# Patient Record
Sex: FEMALE | Race: WHITE | NOT HISPANIC OR LATINO | Employment: UNEMPLOYED | URBAN - METROPOLITAN AREA
[De-identification: names, ages, dates, MRNs, and addresses within clinical notes are randomized per-mention and may not be internally consistent; named-entity substitution may affect disease eponyms.]

---

## 2019-10-10 ENCOUNTER — APPOINTMENT (OUTPATIENT)
Dept: LAB | Facility: HOSPITAL | Age: 54
End: 2019-10-10
Payer: COMMERCIAL

## 2019-10-10 ENCOUNTER — TRANSCRIBE ORDERS (OUTPATIENT)
Dept: ADMINISTRATIVE | Facility: HOSPITAL | Age: 54
End: 2019-10-10

## 2019-10-10 DIAGNOSIS — E11.9 DIABETES MELLITUS WITHOUT COMPLICATION (HCC): Primary | ICD-10-CM

## 2019-10-10 DIAGNOSIS — E78.5 HYPERLIPIDEMIA, UNSPECIFIED HYPERLIPIDEMIA TYPE: ICD-10-CM

## 2019-10-10 DIAGNOSIS — E03.9 HYPOTHYROIDISM, ADULT: ICD-10-CM

## 2019-10-10 DIAGNOSIS — E55.9 VITAMIN D DEFICIENCY: ICD-10-CM

## 2019-10-10 DIAGNOSIS — R53.83 TIREDNESS: ICD-10-CM

## 2019-10-10 DIAGNOSIS — E11.9 DIABETES MELLITUS WITHOUT COMPLICATION (HCC): ICD-10-CM

## 2019-10-10 DIAGNOSIS — Z79.01 LONG TERM (CURRENT) USE OF ANTICOAGULANTS: ICD-10-CM

## 2019-10-10 LAB
25(OH)D3 SERPL-MCNC: 36.1 NG/ML (ref 30–100)
ALBUMIN SERPL BCP-MCNC: 3.6 G/DL (ref 3.5–5)
ALP SERPL-CCNC: 61 U/L (ref 46–116)
ALT SERPL W P-5'-P-CCNC: 21 U/L (ref 12–78)
ANION GAP SERPL CALCULATED.3IONS-SCNC: 5 MMOL/L (ref 4–13)
AST SERPL W P-5'-P-CCNC: 20 U/L (ref 5–45)
BILIRUB DIRECT SERPL-MCNC: 0.1 MG/DL (ref 0–0.2)
BILIRUB SERPL-MCNC: 0.2 MG/DL (ref 0.2–1)
BUN SERPL-MCNC: 3 MG/DL (ref 5–25)
CALCIUM SERPL-MCNC: 8.5 MG/DL (ref 8.3–10.1)
CHLORIDE SERPL-SCNC: 94 MMOL/L (ref 100–108)
CHOLEST SERPL-MCNC: 162 MG/DL (ref 50–200)
CO2 SERPL-SCNC: 30 MMOL/L (ref 21–32)
CREAT SERPL-MCNC: 0.5 MG/DL (ref 0.6–1.3)
ERYTHROCYTE [DISTWIDTH] IN BLOOD BY AUTOMATED COUNT: 12 % (ref 11.6–15.1)
GFR SERPL CREATININE-BSD FRML MDRD: 109 ML/MIN/1.73SQ M
GLUCOSE P FAST SERPL-MCNC: 85 MG/DL (ref 65–99)
HCT VFR BLD AUTO: 40.1 % (ref 34.8–46.1)
HDLC SERPL-MCNC: 76 MG/DL (ref 40–60)
HGB BLD-MCNC: 13.6 G/DL (ref 11.5–15.4)
INR PPP: 2.85 (ref 0.84–1.19)
LDLC SERPL CALC-MCNC: 77 MG/DL (ref 0–100)
MCH RBC QN AUTO: 33.1 PG (ref 26.8–34.3)
MCHC RBC AUTO-ENTMCNC: 33.9 G/DL (ref 31.4–37.4)
MCV RBC AUTO: 98 FL (ref 82–98)
NONHDLC SERPL-MCNC: 86 MG/DL
PLATELET # BLD AUTO: 307 THOUSANDS/UL (ref 149–390)
PMV BLD AUTO: 9.1 FL (ref 8.9–12.7)
POTASSIUM SERPL-SCNC: 4.3 MMOL/L (ref 3.5–5.3)
PROT SERPL-MCNC: 7.1 G/DL (ref 6.4–8.2)
PROTHROMBIN TIME: 30.3 SECONDS (ref 11.6–14.5)
RBC # BLD AUTO: 4.11 MILLION/UL (ref 3.81–5.12)
SODIUM SERPL-SCNC: 129 MMOL/L (ref 136–145)
TRIGL SERPL-MCNC: 46 MG/DL
TSH SERPL DL<=0.05 MIU/L-ACNC: 1.27 UIU/ML (ref 0.36–3.74)
WBC # BLD AUTO: 6.1 THOUSAND/UL (ref 4.31–10.16)

## 2019-10-10 PROCEDURE — 80048 BASIC METABOLIC PNL TOTAL CA: CPT

## 2019-10-10 PROCEDURE — 85027 COMPLETE CBC AUTOMATED: CPT

## 2019-10-10 PROCEDURE — 82306 VITAMIN D 25 HYDROXY: CPT

## 2019-10-10 PROCEDURE — 80061 LIPID PANEL: CPT

## 2019-10-10 PROCEDURE — 80076 HEPATIC FUNCTION PANEL: CPT

## 2019-10-10 PROCEDURE — 36415 COLL VENOUS BLD VENIPUNCTURE: CPT

## 2019-10-10 PROCEDURE — 85610 PROTHROMBIN TIME: CPT

## 2019-10-10 PROCEDURE — 84443 ASSAY THYROID STIM HORMONE: CPT

## 2019-11-15 ENCOUNTER — APPOINTMENT (OUTPATIENT)
Dept: LAB | Facility: HOSPITAL | Age: 54
End: 2019-11-15
Payer: COMMERCIAL

## 2019-11-15 ENCOUNTER — TRANSCRIBE ORDERS (OUTPATIENT)
Dept: ADMINISTRATIVE | Facility: HOSPITAL | Age: 54
End: 2019-11-15

## 2019-11-15 DIAGNOSIS — Z79.01 LONG TERM (CURRENT) USE OF ANTICOAGULANTS: ICD-10-CM

## 2019-11-15 DIAGNOSIS — Z79.01 LONG TERM (CURRENT) USE OF ANTICOAGULANTS: Primary | ICD-10-CM

## 2020-02-04 ENCOUNTER — APPOINTMENT (OUTPATIENT)
Dept: LAB | Facility: HOSPITAL | Age: 55
End: 2020-02-04
Payer: COMMERCIAL

## 2020-02-04 ENCOUNTER — TRANSCRIBE ORDERS (OUTPATIENT)
Dept: ADMINISTRATIVE | Facility: HOSPITAL | Age: 55
End: 2020-02-04

## 2020-02-04 DIAGNOSIS — Z79.01 LONG TERM (CURRENT) USE OF ANTICOAGULANTS: ICD-10-CM

## 2020-02-04 DIAGNOSIS — Z79.01 LONG TERM (CURRENT) USE OF ANTICOAGULANTS: Primary | ICD-10-CM

## 2021-09-10 ENCOUNTER — APPOINTMENT (OUTPATIENT)
Dept: LAB | Facility: HOSPITAL | Age: 56
End: 2021-09-10
Payer: COMMERCIAL

## 2021-09-10 DIAGNOSIS — E13.9 OTHER SPECIFIED DIABETES MELLITUS WITHOUT COMPLICATION, WITHOUT LONG-TERM CURRENT USE OF INSULIN (HCC): ICD-10-CM

## 2021-09-10 DIAGNOSIS — E78.5 HYPERLIPIDEMIA, UNSPECIFIED HYPERLIPIDEMIA TYPE: ICD-10-CM

## 2021-09-10 DIAGNOSIS — R53.82 CHRONIC FATIGUE: Primary | ICD-10-CM

## 2021-09-10 DIAGNOSIS — E03.9 MYXEDEMA HEART DISEASE: ICD-10-CM

## 2021-09-10 DIAGNOSIS — I51.9 MYXEDEMA HEART DISEASE: ICD-10-CM

## 2021-09-10 DIAGNOSIS — Z79.01 LONG TERM (CURRENT) USE OF ANTICOAGULANTS: ICD-10-CM

## 2021-09-10 LAB
ALBUMIN SERPL BCP-MCNC: 4 G/DL (ref 3.5–5)
ALP SERPL-CCNC: 49 U/L (ref 46–116)
ALT SERPL W P-5'-P-CCNC: 23 U/L (ref 12–78)
ANION GAP SERPL CALCULATED.3IONS-SCNC: 7 MMOL/L (ref 4–13)
AST SERPL W P-5'-P-CCNC: 23 U/L (ref 5–45)
BILIRUB SERPL-MCNC: 0.64 MG/DL (ref 0.2–1)
BUN SERPL-MCNC: 6 MG/DL (ref 5–25)
CALCIUM SERPL-MCNC: 8.8 MG/DL (ref 8.3–10.1)
CHLORIDE SERPL-SCNC: 95 MMOL/L (ref 100–108)
CHOLEST SERPL-MCNC: 163 MG/DL (ref 50–200)
CO2 SERPL-SCNC: 30 MMOL/L (ref 21–32)
CREAT SERPL-MCNC: 0.49 MG/DL (ref 0.6–1.3)
ERYTHROCYTE [DISTWIDTH] IN BLOOD BY AUTOMATED COUNT: 12.2 % (ref 11.6–15.1)
EST. AVERAGE GLUCOSE BLD GHB EST-MCNC: 105 MG/DL
GFR SERPL CREATININE-BSD FRML MDRD: 109 ML/MIN/1.73SQ M
GLUCOSE P FAST SERPL-MCNC: 78 MG/DL (ref 65–99)
HBA1C MFR BLD: 5.3 %
HCT VFR BLD AUTO: 40.5 % (ref 34.8–46.1)
HDLC SERPL-MCNC: 75 MG/DL
HGB BLD-MCNC: 13.8 G/DL (ref 11.5–15.4)
INR PPP: 1.37 (ref 0.84–1.19)
LDLC SERPL CALC-MCNC: 77 MG/DL (ref 0–100)
MCH RBC QN AUTO: 32.8 PG (ref 26.8–34.3)
MCHC RBC AUTO-ENTMCNC: 34.1 G/DL (ref 31.4–37.4)
MCV RBC AUTO: 96 FL (ref 82–98)
NONHDLC SERPL-MCNC: 88 MG/DL
PLATELET # BLD AUTO: 264 THOUSANDS/UL (ref 149–390)
PMV BLD AUTO: 9.2 FL (ref 8.9–12.7)
POTASSIUM SERPL-SCNC: 4.7 MMOL/L (ref 3.5–5.3)
PROT SERPL-MCNC: 7.5 G/DL (ref 6.4–8.2)
PROTHROMBIN TIME: 17.1 SECONDS (ref 11.6–14.5)
RBC # BLD AUTO: 4.21 MILLION/UL (ref 3.81–5.12)
SODIUM SERPL-SCNC: 132 MMOL/L (ref 136–145)
TRIGL SERPL-MCNC: 56 MG/DL
TSH SERPL DL<=0.05 MIU/L-ACNC: 1.21 UIU/ML (ref 0.36–3.74)
WBC # BLD AUTO: 6.42 THOUSAND/UL (ref 4.31–10.16)

## 2021-09-10 PROCEDURE — 84443 ASSAY THYROID STIM HORMONE: CPT

## 2021-09-10 PROCEDURE — 36415 COLL VENOUS BLD VENIPUNCTURE: CPT

## 2021-09-10 PROCEDURE — 83036 HEMOGLOBIN GLYCOSYLATED A1C: CPT

## 2021-09-10 PROCEDURE — 85610 PROTHROMBIN TIME: CPT

## 2021-09-10 PROCEDURE — 80061 LIPID PANEL: CPT

## 2021-09-10 PROCEDURE — 80053 COMPREHEN METABOLIC PANEL: CPT

## 2021-09-10 PROCEDURE — 85027 COMPLETE CBC AUTOMATED: CPT

## 2023-09-19 ENCOUNTER — APPOINTMENT (OUTPATIENT)
Dept: LAB | Facility: HOSPITAL | Age: 58
End: 2023-09-19
Payer: COMMERCIAL

## 2023-09-19 DIAGNOSIS — E03.9 MYXEDEMA HEART DISEASE: ICD-10-CM

## 2023-09-19 DIAGNOSIS — R53.83 FATIGUE, UNSPECIFIED TYPE: ICD-10-CM

## 2023-09-19 DIAGNOSIS — Z79.01 LONG TERM (CURRENT) USE OF ANTICOAGULANTS: ICD-10-CM

## 2023-09-19 DIAGNOSIS — E78.5 HYPERLIPIDEMIA, UNSPECIFIED HYPERLIPIDEMIA TYPE: ICD-10-CM

## 2023-09-19 DIAGNOSIS — I51.9 MYXEDEMA HEART DISEASE: ICD-10-CM

## 2023-09-19 DIAGNOSIS — E11.9 DIABETES MELLITUS WITHOUT COMPLICATION (HCC): ICD-10-CM

## 2023-09-19 LAB
ALBUMIN SERPL BCP-MCNC: 4.3 G/DL (ref 3.5–5)
ALP SERPL-CCNC: 65 U/L (ref 34–104)
ALT SERPL W P-5'-P-CCNC: 22 U/L (ref 7–52)
ANION GAP SERPL CALCULATED.3IONS-SCNC: 5 MMOL/L
AST SERPL W P-5'-P-CCNC: 23 U/L (ref 13–39)
BILIRUB SERPL-MCNC: 0.5 MG/DL (ref 0.2–1)
BUN SERPL-MCNC: 7 MG/DL (ref 5–25)
CALCIUM SERPL-MCNC: 9.4 MG/DL (ref 8.4–10.2)
CHLORIDE SERPL-SCNC: 95 MMOL/L (ref 96–108)
CHOLEST SERPL-MCNC: 150 MG/DL
CO2 SERPL-SCNC: 32 MMOL/L (ref 21–32)
CREAT SERPL-MCNC: 0.44 MG/DL (ref 0.6–1.3)
ERYTHROCYTE [DISTWIDTH] IN BLOOD BY AUTOMATED COUNT: 11.9 % (ref 11.6–15.1)
GFR SERPL CREATININE-BSD FRML MDRD: 110 ML/MIN/1.73SQ M
GLUCOSE P FAST SERPL-MCNC: 83 MG/DL (ref 65–99)
HCT VFR BLD AUTO: 43.2 % (ref 34.8–46.1)
HDLC SERPL-MCNC: 74 MG/DL
HGB BLD-MCNC: 14.3 G/DL (ref 11.5–15.4)
INR PPP: 2.13 (ref 0.84–1.19)
LDLC SERPL CALC-MCNC: 62 MG/DL (ref 0–100)
MCH RBC QN AUTO: 33.2 PG (ref 26.8–34.3)
MCHC RBC AUTO-ENTMCNC: 33.1 G/DL (ref 31.4–37.4)
MCV RBC AUTO: 100 FL (ref 82–98)
NONHDLC SERPL-MCNC: 76 MG/DL
PLATELET # BLD AUTO: 192 THOUSANDS/UL (ref 149–390)
PMV BLD AUTO: 9.9 FL (ref 8.9–12.7)
POTASSIUM SERPL-SCNC: 4.7 MMOL/L (ref 3.5–5.3)
PROT SERPL-MCNC: 7.1 G/DL (ref 6.4–8.4)
PROTHROMBIN TIME: 24.7 SECONDS (ref 11.6–14.5)
RBC # BLD AUTO: 4.31 MILLION/UL (ref 3.81–5.12)
SODIUM SERPL-SCNC: 132 MMOL/L (ref 135–147)
TRIGL SERPL-MCNC: 72 MG/DL
TSH SERPL DL<=0.05 MIU/L-ACNC: 1.05 UIU/ML (ref 0.45–4.5)
WBC # BLD AUTO: 4.78 THOUSAND/UL (ref 4.31–10.16)

## 2023-09-19 PROCEDURE — 84443 ASSAY THYROID STIM HORMONE: CPT

## 2023-09-19 PROCEDURE — 80053 COMPREHEN METABOLIC PANEL: CPT

## 2023-09-19 PROCEDURE — 85027 COMPLETE CBC AUTOMATED: CPT

## 2023-09-19 PROCEDURE — 85610 PROTHROMBIN TIME: CPT

## 2023-09-19 PROCEDURE — 36415 COLL VENOUS BLD VENIPUNCTURE: CPT

## 2023-09-19 PROCEDURE — 80061 LIPID PANEL: CPT

## 2024-01-05 ENCOUNTER — APPOINTMENT (EMERGENCY)
Dept: RADIOLOGY | Facility: HOSPITAL | Age: 59
DRG: 177 | End: 2024-01-05
Payer: COMMERCIAL

## 2024-01-05 ENCOUNTER — HOSPITAL ENCOUNTER (INPATIENT)
Facility: HOSPITAL | Age: 59
LOS: 8 days | Discharge: HOME | DRG: 177 | End: 2024-01-13
Attending: EMERGENCY MEDICINE | Admitting: FAMILY MEDICINE
Payer: COMMERCIAL

## 2024-01-05 DIAGNOSIS — U07.1 COVID-19: Primary | ICD-10-CM

## 2024-01-05 DIAGNOSIS — J44.9 COPD (CHRONIC OBSTRUCTIVE PULMONARY DISEASE) (HCC): ICD-10-CM

## 2024-01-05 DIAGNOSIS — J44.1 COPD EXACERBATION (HCC): ICD-10-CM

## 2024-01-05 DIAGNOSIS — E83.42 HYPOMAGNESEMIA: ICD-10-CM

## 2024-01-05 DIAGNOSIS — E87.1 HYPONATREMIA: ICD-10-CM

## 2024-01-05 DIAGNOSIS — R78.81 POSITIVE BLOOD CULTURE: ICD-10-CM

## 2024-01-05 PROBLEM — G35 MULTIPLE SCLEROSIS (HCC): Status: ACTIVE | Noted: 2024-01-05

## 2024-01-05 PROBLEM — G54.0 THORACIC OUTLET SYNDROME: Status: ACTIVE | Noted: 2024-01-05

## 2024-01-05 LAB
2HR DELTA HS TROPONIN: -15 NG/L
4HR DELTA HS TROPONIN: -19 NG/L
ALBUMIN SERPL BCP-MCNC: 4 G/DL (ref 3.5–5)
ALP SERPL-CCNC: 50 U/L (ref 34–104)
ALT SERPL W P-5'-P-CCNC: 25 U/L (ref 7–52)
ANION GAP SERPL CALCULATED.3IONS-SCNC: 7 MMOL/L
APTT PPP: 46 SECONDS (ref 23–37)
AST SERPL W P-5'-P-CCNC: 53 U/L (ref 13–39)
BASE EX.OXY STD BLDV CALC-SCNC: 65.1 % (ref 60–80)
BASE EXCESS BLDV CALC-SCNC: 4.7 MMOL/L
BASOPHILS # BLD AUTO: 0.01 THOUSANDS/ÂΜL (ref 0–0.1)
BASOPHILS NFR BLD AUTO: 0 % (ref 0–1)
BILIRUB SERPL-MCNC: 0.39 MG/DL (ref 0.2–1)
BNP SERPL-MCNC: 262 PG/ML (ref 0–100)
BUN SERPL-MCNC: 14 MG/DL (ref 5–25)
CALCIUM SERPL-MCNC: 8.1 MG/DL (ref 8.4–10.2)
CARDIAC TROPONIN I PNL SERPL HS: 73 NG/L
CARDIAC TROPONIN I PNL SERPL HS: 77 NG/L
CARDIAC TROPONIN I PNL SERPL HS: 92 NG/L
CHLORIDE SERPL-SCNC: 77 MMOL/L (ref 96–108)
CO2 SERPL-SCNC: 33 MMOL/L (ref 21–32)
CREAT SERPL-MCNC: 0.36 MG/DL (ref 0.6–1.3)
D DIMER PPP FEU-MCNC: <0.27 UG/ML FEU
EOSINOPHIL # BLD AUTO: 0 THOUSAND/ÂΜL (ref 0–0.61)
EOSINOPHIL NFR BLD AUTO: 0 % (ref 0–6)
ERYTHROCYTE [DISTWIDTH] IN BLOOD BY AUTOMATED COUNT: 11.6 % (ref 11.6–15.1)
FLUAV RNA RESP QL NAA+PROBE: NEGATIVE
FLUBV RNA RESP QL NAA+PROBE: NEGATIVE
GFR SERPL CREATININE-BSD FRML MDRD: 118 ML/MIN/1.73SQ M
GLUCOSE SERPL-MCNC: 117 MG/DL (ref 65–140)
HCO3 BLDV-SCNC: 32.9 MMOL/L (ref 24–30)
HCT VFR BLD AUTO: 43.3 % (ref 34.8–46.1)
HGB BLD-MCNC: 14.8 G/DL (ref 11.5–15.4)
IMM GRANULOCYTES # BLD AUTO: 0.02 THOUSAND/UL (ref 0–0.2)
IMM GRANULOCYTES NFR BLD AUTO: 0 % (ref 0–2)
INR PPP: 3.53 (ref 0.84–1.19)
LACTATE SERPL-SCNC: 1.2 MMOL/L (ref 0.5–2)
LYMPHOCYTES # BLD AUTO: 0.8 THOUSANDS/ÂΜL (ref 0.6–4.47)
LYMPHOCYTES NFR BLD AUTO: 17 % (ref 14–44)
MCH RBC QN AUTO: 33.2 PG (ref 26.8–34.3)
MCHC RBC AUTO-ENTMCNC: 34.2 G/DL (ref 31.4–37.4)
MCV RBC AUTO: 97 FL (ref 82–98)
MONOCYTES # BLD AUTO: 0.62 THOUSAND/ÂΜL (ref 0.17–1.22)
MONOCYTES NFR BLD AUTO: 13 % (ref 4–12)
NEUTROPHILS # BLD AUTO: 3.31 THOUSANDS/ÂΜL (ref 1.85–7.62)
NEUTS SEG NFR BLD AUTO: 70 % (ref 43–75)
NRBC BLD AUTO-RTO: 0 /100 WBCS
O2 CT BLDV-SCNC: 14.6 ML/DL
PCO2 BLDV: 63.6 MM HG (ref 42–50)
PH BLDV: 7.33 [PH] (ref 7.3–7.4)
PLATELET # BLD AUTO: 161 THOUSANDS/UL (ref 149–390)
PMV BLD AUTO: 9.9 FL (ref 8.9–12.7)
PO2 BLDV: 37.6 MM HG (ref 35–45)
POTASSIUM SERPL-SCNC: 4.8 MMOL/L (ref 3.5–5.3)
PROT SERPL-MCNC: 6.5 G/DL (ref 6.4–8.4)
PROTHROMBIN TIME: 36.4 SECONDS (ref 11.6–14.5)
RBC # BLD AUTO: 4.46 MILLION/UL (ref 3.81–5.12)
RSV RNA RESP QL NAA+PROBE: NEGATIVE
SARS-COV-2 RNA RESP QL NAA+PROBE: POSITIVE
SODIUM SERPL-SCNC: 117 MMOL/L (ref 135–147)
WBC # BLD AUTO: 4.76 THOUSAND/UL (ref 4.31–10.16)

## 2024-01-05 PROCEDURE — 99285 EMERGENCY DEPT VISIT HI MDM: CPT

## 2024-01-05 PROCEDURE — 80053 COMPREHEN METABOLIC PANEL: CPT | Performed by: PHYSICIAN ASSISTANT

## 2024-01-05 PROCEDURE — 36600 WITHDRAWAL OF ARTERIAL BLOOD: CPT

## 2024-01-05 PROCEDURE — 85379 FIBRIN DEGRADATION QUANT: CPT | Performed by: PHYSICIAN ASSISTANT

## 2024-01-05 PROCEDURE — 36415 COLL VENOUS BLD VENIPUNCTURE: CPT | Performed by: PHYSICIAN ASSISTANT

## 2024-01-05 PROCEDURE — 87154 CUL TYP ID BLD PTHGN 6+ TRGT: CPT | Performed by: PHYSICIAN ASSISTANT

## 2024-01-05 PROCEDURE — 85014 HEMATOCRIT: CPT

## 2024-01-05 PROCEDURE — 85025 COMPLETE CBC W/AUTO DIFF WBC: CPT | Performed by: PHYSICIAN ASSISTANT

## 2024-01-05 PROCEDURE — 99223 1ST HOSP IP/OBS HIGH 75: CPT | Performed by: FAMILY MEDICINE

## 2024-01-05 PROCEDURE — 87077 CULTURE AEROBIC IDENTIFY: CPT | Performed by: PHYSICIAN ASSISTANT

## 2024-01-05 PROCEDURE — 94644 CONT INHLJ TX 1ST HOUR: CPT

## 2024-01-05 PROCEDURE — 87040 BLOOD CULTURE FOR BACTERIA: CPT | Performed by: PHYSICIAN ASSISTANT

## 2024-01-05 PROCEDURE — 83880 ASSAY OF NATRIURETIC PEPTIDE: CPT | Performed by: PHYSICIAN ASSISTANT

## 2024-01-05 PROCEDURE — 82803 BLOOD GASES ANY COMBINATION: CPT

## 2024-01-05 PROCEDURE — 84295 ASSAY OF SERUM SODIUM: CPT

## 2024-01-05 PROCEDURE — 85730 THROMBOPLASTIN TIME PARTIAL: CPT | Performed by: PHYSICIAN ASSISTANT

## 2024-01-05 PROCEDURE — 82947 ASSAY GLUCOSE BLOOD QUANT: CPT

## 2024-01-05 PROCEDURE — 0241U HB NFCT DS VIR RESP RNA 4 TRGT: CPT | Performed by: PHYSICIAN ASSISTANT

## 2024-01-05 PROCEDURE — 82805 BLOOD GASES W/O2 SATURATION: CPT | Performed by: PHYSICIAN ASSISTANT

## 2024-01-05 PROCEDURE — 83605 ASSAY OF LACTIC ACID: CPT | Performed by: PHYSICIAN ASSISTANT

## 2024-01-05 PROCEDURE — 84132 ASSAY OF SERUM POTASSIUM: CPT

## 2024-01-05 PROCEDURE — 71045 X-RAY EXAM CHEST 1 VIEW: CPT

## 2024-01-05 PROCEDURE — 82330 ASSAY OF CALCIUM: CPT

## 2024-01-05 PROCEDURE — 93005 ELECTROCARDIOGRAM TRACING: CPT

## 2024-01-05 PROCEDURE — 94760 N-INVAS EAR/PLS OXIMETRY 1: CPT

## 2024-01-05 PROCEDURE — 96374 THER/PROPH/DIAG INJ IV PUSH: CPT

## 2024-01-05 PROCEDURE — 99285 EMERGENCY DEPT VISIT HI MDM: CPT | Performed by: PHYSICIAN ASSISTANT

## 2024-01-05 PROCEDURE — 84484 ASSAY OF TROPONIN QUANT: CPT | Performed by: PHYSICIAN ASSISTANT

## 2024-01-05 PROCEDURE — 85610 PROTHROMBIN TIME: CPT | Performed by: PHYSICIAN ASSISTANT

## 2024-01-05 RX ORDER — DIAZEPAM 5 MG/1
5 TABLET ORAL EVERY 6 HOURS PRN
COMMUNITY

## 2024-01-05 RX ORDER — FLUTICASONE PROPIONATE AND SALMETEROL 250; 50 UG/1; UG/1
1 POWDER RESPIRATORY (INHALATION) 2 TIMES DAILY
Status: ON HOLD | COMMUNITY
End: 2024-01-13

## 2024-01-05 RX ORDER — ALBUTEROL SULFATE 90 UG/1
2 AEROSOL, METERED RESPIRATORY (INHALATION) EVERY 4 HOURS PRN
Status: DISCONTINUED | OUTPATIENT
Start: 2024-01-05 | End: 2024-01-13 | Stop reason: HOSPADM

## 2024-01-05 RX ORDER — SODIUM CHLORIDE FOR INHALATION 0.9 %
12 VIAL, NEBULIZER (ML) INHALATION ONCE
Status: COMPLETED | OUTPATIENT
Start: 2024-01-05 | End: 2024-01-05

## 2024-01-05 RX ORDER — WARFARIN SODIUM 5 MG/1
5 TABLET ORAL
COMMUNITY

## 2024-01-05 RX ORDER — ALBUTEROL SULFATE 90 UG/1
2 AEROSOL, METERED RESPIRATORY (INHALATION) EVERY 6 HOURS PRN
Status: ON HOLD | COMMUNITY
End: 2024-01-13

## 2024-01-05 RX ORDER — TIOTROPIUM BROMIDE 18 UG/1
18 CAPSULE ORAL; RESPIRATORY (INHALATION) DAILY
Status: ON HOLD | COMMUNITY
End: 2024-01-13

## 2024-01-05 RX ORDER — METHYLPREDNISOLONE SODIUM SUCCINATE 125 MG/2ML
80 INJECTION, POWDER, LYOPHILIZED, FOR SOLUTION INTRAMUSCULAR; INTRAVENOUS ONCE
Status: COMPLETED | OUTPATIENT
Start: 2024-01-05 | End: 2024-01-05

## 2024-01-05 RX ORDER — ACETAMINOPHEN AND CODEINE PHOSPHATE 300; 60 MG/1; MG/1
1 TABLET ORAL EVERY 4 HOURS PRN
COMMUNITY

## 2024-01-05 RX ADMIN — IPRATROPIUM BROMIDE 1 MG: 0.5 SOLUTION RESPIRATORY (INHALATION) at 18:18

## 2024-01-05 RX ADMIN — METHYLPREDNISOLONE SODIUM SUCCINATE 80 MG: 125 INJECTION, POWDER, FOR SOLUTION INTRAMUSCULAR; INTRAVENOUS at 18:41

## 2024-01-05 RX ADMIN — Medication 12 ML: at 18:18

## 2024-01-05 RX ADMIN — ALBUTEROL SULFATE 10 MG: 2.5 SOLUTION RESPIRATORY (INHALATION) at 18:18

## 2024-01-06 PROBLEM — J96.01 ACUTE RESPIRATORY FAILURE WITH HYPOXIA (HCC): Status: ACTIVE | Noted: 2024-01-06

## 2024-01-06 LAB
ANION GAP SERPL CALCULATED.3IONS-SCNC: 0 MMOL/L
ANION GAP SERPL CALCULATED.3IONS-SCNC: 3 MMOL/L
ANION GAP SERPL CALCULATED.3IONS-SCNC: 4 MMOL/L
ATRIAL RATE: 97 BPM
BUN SERPL-MCNC: 7 MG/DL (ref 5–25)
BUN SERPL-MCNC: 9 MG/DL (ref 5–25)
BUN SERPL-MCNC: 9 MG/DL (ref 5–25)
CALCIUM SERPL-MCNC: 7.5 MG/DL (ref 8.4–10.2)
CALCIUM SERPL-MCNC: 7.6 MG/DL (ref 8.4–10.2)
CALCIUM SERPL-MCNC: 7.8 MG/DL (ref 8.4–10.2)
CHLORIDE SERPL-SCNC: 84 MMOL/L (ref 96–108)
CHLORIDE SERPL-SCNC: 84 MMOL/L (ref 96–108)
CHLORIDE SERPL-SCNC: 85 MMOL/L (ref 96–108)
CO2 SERPL-SCNC: 38 MMOL/L (ref 21–32)
CO2 SERPL-SCNC: 38 MMOL/L (ref 21–32)
CO2 SERPL-SCNC: 41 MMOL/L (ref 21–32)
CREAT SERPL-MCNC: 0.26 MG/DL (ref 0.6–1.3)
CREAT SERPL-MCNC: 0.3 MG/DL (ref 0.6–1.3)
CREAT SERPL-MCNC: 0.32 MG/DL (ref 0.6–1.3)
CRP SERPL QL: 1.4 MG/L
ERYTHROCYTE [DISTWIDTH] IN BLOOD BY AUTOMATED COUNT: 11.5 % (ref 11.6–15.1)
GFR SERPL CREATININE-BSD FRML MDRD: 123 ML/MIN/1.73SQ M
GFR SERPL CREATININE-BSD FRML MDRD: 125 ML/MIN/1.73SQ M
GFR SERPL CREATININE-BSD FRML MDRD: 131 ML/MIN/1.73SQ M
GLUCOSE SERPL-MCNC: 108 MG/DL (ref 65–140)
GLUCOSE SERPL-MCNC: 124 MG/DL (ref 65–140)
GLUCOSE SERPL-MCNC: 138 MG/DL (ref 65–140)
HCT VFR BLD AUTO: 41.4 % (ref 34.8–46.1)
HGB BLD-MCNC: 14.3 G/DL (ref 11.5–15.4)
INR PPP: 3.76 (ref 0.84–1.19)
INR PPP: 4.04 (ref 0.84–1.19)
MAGNESIUM SERPL-MCNC: 1.3 MG/DL (ref 1.9–2.7)
MCH RBC QN AUTO: 33.2 PG (ref 26.8–34.3)
MCHC RBC AUTO-ENTMCNC: 34.5 G/DL (ref 31.4–37.4)
MCV RBC AUTO: 96 FL (ref 82–98)
OSMOLALITY UR: 187 MMOL/KG
P AXIS: 85 DEGREES
PHOSPHATE SERPL-MCNC: 3.5 MG/DL (ref 2.7–4.5)
PLATELET # BLD AUTO: 145 THOUSANDS/UL (ref 149–390)
PMV BLD AUTO: 9.2 FL (ref 8.9–12.7)
POTASSIUM SERPL-SCNC: 4 MMOL/L (ref 3.5–5.3)
POTASSIUM SERPL-SCNC: 4.5 MMOL/L (ref 3.5–5.3)
POTASSIUM SERPL-SCNC: 4.5 MMOL/L (ref 3.5–5.3)
PR INTERVAL: 130 MS
PROCALCITONIN SERPL-MCNC: 0.13 NG/ML
PROTHROMBIN TIME: 38.2 SECONDS (ref 11.6–14.5)
PROTHROMBIN TIME: 40.4 SECONDS (ref 11.6–14.5)
QRS AXIS: 94 DEGREES
QRSD INTERVAL: 76 MS
QT INTERVAL: 340 MS
QTC INTERVAL: 431 MS
RBC # BLD AUTO: 4.31 MILLION/UL (ref 3.81–5.12)
SODIUM 24H UR-SCNC: <10 MOL/L
SODIUM SERPL-SCNC: 125 MMOL/L (ref 135–147)
SODIUM SERPL-SCNC: 125 MMOL/L (ref 135–147)
SODIUM SERPL-SCNC: 127 MMOL/L (ref 135–147)
T WAVE AXIS: 79 DEGREES
VENTRICULAR RATE: 97 BPM
WBC # BLD AUTO: 2.71 THOUSAND/UL (ref 4.31–10.16)

## 2024-01-06 PROCEDURE — 80048 BASIC METABOLIC PNL TOTAL CA: CPT | Performed by: FAMILY MEDICINE

## 2024-01-06 PROCEDURE — 83735 ASSAY OF MAGNESIUM: CPT | Performed by: FAMILY MEDICINE

## 2024-01-06 PROCEDURE — 84300 ASSAY OF URINE SODIUM: CPT | Performed by: FAMILY MEDICINE

## 2024-01-06 PROCEDURE — 85027 COMPLETE CBC AUTOMATED: CPT | Performed by: FAMILY MEDICINE

## 2024-01-06 PROCEDURE — 83935 ASSAY OF URINE OSMOLALITY: CPT | Performed by: FAMILY MEDICINE

## 2024-01-06 PROCEDURE — XW033E5 INTRODUCTION OF REMDESIVIR ANTI-INFECTIVE INTO PERIPHERAL VEIN, PERCUTANEOUS APPROACH, NEW TECHNOLOGY GROUP 5: ICD-10-PCS | Performed by: STUDENT IN AN ORGANIZED HEALTH CARE EDUCATION/TRAINING PROGRAM

## 2024-01-06 PROCEDURE — 86140 C-REACTIVE PROTEIN: CPT | Performed by: INTERNAL MEDICINE

## 2024-01-06 PROCEDURE — 99222 1ST HOSP IP/OBS MODERATE 55: CPT | Performed by: INTERNAL MEDICINE

## 2024-01-06 PROCEDURE — 85610 PROTHROMBIN TIME: CPT | Performed by: FAMILY MEDICINE

## 2024-01-06 PROCEDURE — 84145 PROCALCITONIN (PCT): CPT | Performed by: FAMILY MEDICINE

## 2024-01-06 PROCEDURE — 94760 N-INVAS EAR/PLS OXIMETRY 1: CPT

## 2024-01-06 PROCEDURE — 99233 SBSQ HOSP IP/OBS HIGH 50: CPT | Performed by: INTERNAL MEDICINE

## 2024-01-06 PROCEDURE — 84100 ASSAY OF PHOSPHORUS: CPT | Performed by: FAMILY MEDICINE

## 2024-01-06 RX ORDER — DEXTROSE MONOHYDRATE 50 MG/ML
75 INJECTION, SOLUTION INTRAVENOUS CONTINUOUS
Status: DISCONTINUED | OUTPATIENT
Start: 2024-01-06 | End: 2024-01-07

## 2024-01-06 RX ORDER — WARFARIN SODIUM 5 MG/1
5 TABLET ORAL
Status: DISCONTINUED | OUTPATIENT
Start: 2024-01-06 | End: 2024-01-06

## 2024-01-06 RX ORDER — DEXAMETHASONE SODIUM PHOSPHATE 10 MG/ML
6 INJECTION, SOLUTION INTRAMUSCULAR; INTRAVENOUS EVERY 24 HOURS
Qty: 10 ML | Refills: 0 | Status: DISCONTINUED | OUTPATIENT
Start: 2024-01-06 | End: 2024-01-13 | Stop reason: HOSPADM

## 2024-01-06 RX ORDER — SODIUM CHLORIDE 9 MG/ML
75 INJECTION, SOLUTION INTRAVENOUS CONTINUOUS
Status: DISCONTINUED | OUTPATIENT
Start: 2024-01-06 | End: 2024-01-06

## 2024-01-06 RX ORDER — FLUTICASONE FUROATE AND VILANTEROL 200; 25 UG/1; UG/1
1 POWDER RESPIRATORY (INHALATION) DAILY
Status: DISCONTINUED | OUTPATIENT
Start: 2024-01-06 | End: 2024-01-07

## 2024-01-06 RX ORDER — ACETAMINOPHEN 325 MG/1
650 TABLET ORAL EVERY 6 HOURS PRN
Status: DISCONTINUED | OUTPATIENT
Start: 2024-01-06 | End: 2024-01-13 | Stop reason: HOSPADM

## 2024-01-06 RX ORDER — NICOTINE 21 MG/24HR
1 PATCH, TRANSDERMAL 24 HOURS TRANSDERMAL DAILY
Status: DISCONTINUED | OUTPATIENT
Start: 2024-01-06 | End: 2024-01-13 | Stop reason: HOSPADM

## 2024-01-06 RX ORDER — MAGNESIUM SULFATE HEPTAHYDRATE 40 MG/ML
4 INJECTION, SOLUTION INTRAVENOUS ONCE
Status: COMPLETED | OUTPATIENT
Start: 2024-01-06 | End: 2024-01-07

## 2024-01-06 RX ORDER — ONDANSETRON 2 MG/ML
4 INJECTION INTRAMUSCULAR; INTRAVENOUS EVERY 6 HOURS PRN
Status: DISCONTINUED | OUTPATIENT
Start: 2024-01-06 | End: 2024-01-13 | Stop reason: HOSPADM

## 2024-01-06 RX ORDER — CEFTRIAXONE 1 G/50ML
1000 INJECTION, SOLUTION INTRAVENOUS EVERY 24 HOURS
Status: DISCONTINUED | OUTPATIENT
Start: 2024-01-06 | End: 2024-01-07

## 2024-01-06 RX ADMIN — DEXAMETHASONE SODIUM PHOSPHATE 6 MG: 10 INJECTION, SOLUTION INTRAMUSCULAR; INTRAVENOUS at 02:49

## 2024-01-06 RX ADMIN — SODIUM CHLORIDE 75 ML/HR: 0.9 INJECTION, SOLUTION INTRAVENOUS at 02:41

## 2024-01-06 RX ADMIN — REMDESIVIR 200 MG: 100 INJECTION, POWDER, LYOPHILIZED, FOR SOLUTION INTRAVENOUS at 02:43

## 2024-01-06 RX ADMIN — MAGNESIUM SULFATE HEPTAHYDRATE 4 G: 40 INJECTION, SOLUTION INTRAVENOUS at 08:35

## 2024-01-06 RX ADMIN — DEXTROSE 75 ML/HR: 5 SOLUTION INTRAVENOUS at 08:35

## 2024-01-06 RX ADMIN — CEFTRIAXONE 1000 MG: 1 INJECTION, SOLUTION INTRAVENOUS at 05:46

## 2024-01-06 NOTE — ASSESSMENT & PLAN NOTE
Acute respiratory failure with hypoxia secondary to COVID-19 infection superimposed on baseline COPD  Currently requiring 5 L  Continue to monitor and titrate as needed to maintain oxygen saturation greater than 88%  See treatment plan below for COVID

## 2024-01-06 NOTE — PLAN OF CARE
Problem: PAIN - ADULT  Goal: Verbalizes/displays adequate comfort level or baseline comfort level  Description: Interventions:  - Encourage patient to monitor pain and request assistance  - Assess pain using appropriate pain scale  - Administer analgesics based on type and severity of pain and evaluate response  - Implement non-pharmacological measures as appropriate and evaluate response  - Consider cultural and social influences on pain and pain management  - Notify physician/advanced practitioner if interventions unsuccessful or patient reports new pain  Outcome: Progressing     Problem: INFECTION - ADULT  Goal: Absence or prevention of progression during hospitalization  Description: INTERVENTIONS:  - Assess and monitor for signs and symptoms of infection  - Monitor lab/diagnostic results  - Monitor all insertion sites, i.e. indwelling lines, tubes, and drains  - Monitor endotracheal if appropriate and nasal secretions for changes in amount and color  - [place] appropriate cooling/warming therapies per order  - Administer medications as ordered  - Instruct and encourage patient and family to use good hand hygiene technique  - Identify and instruct in appropriate isolation precautions for identified infection/condition  Outcome: Progressing

## 2024-01-06 NOTE — PROGRESS NOTES
[facility]  Progress Note  Name: Loretta Partida I  MRN: [id number]  Unit/Bed#: -01 I Date of Admission: 1/5/2024   Date of Service: 1/6/2024 I Hospital Day: 1    Assessment/Plan   * Acute respiratory failure with hypoxia (HCC)  Assessment & Plan  Acute respiratory failure with hypoxia secondary to COVID-19 infection superimposed on baseline COPD  Currently requiring 5 L  Continue to monitor and titrate as needed to maintain oxygen saturation greater than 88%  See treatment plan below for COVID    Hyponatremia  Assessment & Plan  Severe hyponatremia on admission at 117  Corrected rapidly overnight to 127 with IV fluid hydration  Will place on D5 infusion to slow and reverse the rate of correction  Monitor BMP every 6 hours  Appreciate nephrology recommendations    COVID-19  Assessment & Plan  Patient presented with progressive shortness of breath and dyspnea and found to be positive for COVID in the emergency room  This is superimposed on baseline COPD, active smoking    Continue IV remdesivir x 5 days  IV Decadron 6 mg daily x 10 days  Wean O2 as tolerated  D-dimer negative though patient already on Coumadin; will continue  Procalcitonin negative x 1; will recheck tomorrow a.m. and if negative discontinue ceftriaxone  Trend inflammatory markers    Thoracic outlet syndrome  Assessment & Plan  Patient is on Coumadin  Holding home Coumadin currently secondary to supratherapeutic INR  Trend daily INR; when within therapeutic range will restart at home dose    Multiple sclerosis (HCC)  Assessment & Plan  The patient is on some pain medications and Valium as needed    COPD (chronic obstructive pulmonary disease) (HCC)  Assessment & Plan  Continue home inhaler regimen  IV Decadron per COVID protocol  Titrate oxygen as above        VTE Pharmacologic Prophylaxis: VTE Score: 5 High Risk (Score >/= 5) - Pharmacological DVT Prophylaxis Ordered: warfarin (Coumadin). Sequential Compression Devices  Ordered.    Mobility:   Basic Mobility Inpatient Raw Score: 20  -HLM Goal: 6: Walk 10 steps or more  -HLM Achieved: 3: Sit at edge of bed  HLM Goal NOT achieved. Continue with multidisciplinary rounding and encourage appropriate mobility to improve upon HLM goals.    Patient Centered Rounds: I performed bedside rounds with nursing staff today.   Discussions with Specialists or Other Care Team Provider: [facility].     Education and Discussions with Family / Patient: Updated  () at bedside.    Total Time Spent on Date of Encounter in care of patient: 45 mins. This time was spent on one or more of the following: performing physical exam; counseling and coordination of care; obtaining or reviewing history; documenting in the medical record; reviewing/ordering tests, medications or procedures; communicating with other healthcare professionals and discussing with patient's family/caregivers.    Current Length of Stay: 1 day(s)  Current Patient Status: Inpatient   Certification Statement: The patient will continue to require additional inpatient hospital stay due to oxygen titration, COVID-pneumonia, IV steroids and antivirals, medication titration, serial lab monitoring, dispo planning.  Discharge Plan: Anticipate discharge in >72 hrs to discharge location to be determined pending rehab evaluations.    Code Status: Level 1 - Full Code    Subjective:   Patient seen and examined.  She is having dyspnea and shortness of breath even just with trying to eat and at rest.  Oxygen needs is up to 5 L.  She has a nonproductive cough.  Afebrile.    Objective:     Vitals:   Temp (24hrs), Av.7 °F (37.1 °C), Min:98.1 °F (36.7 °C), Max:99.2 °F (37.3 °C)    Temp:  [98.1 °F (36.7 °C)-99.2 °F (37.3 °C)] 98.1 °F (36.7 °C)  HR:  [] 99  Resp:  [22-26] 22  BP: (115-143)/(58-85) 116/68  SpO2:  [44 %-100 %] 85 %  Body mass index is 13.55 kg/m².     Input and Output Summary (last 24 hours):      Intake/Output Summary (Last 24 hours) at 1/6/2024 1159  Last data filed at 1/6/2024 0900  Gross per 24 hour   Intake 120 ml   Output 800 ml   Net -680 ml       PHYSICAL EXAM:    Vitals signs reviewed  Constitutional   Awake and cooperative. NAD.  Frail-appearing.  Appears malnourished.   Head/Neck   Normocephalic. Atraumatic.   HEENT   No scleral icterus. EOMI.   Heart   Regular rate and rhythm. No murmers.   Lungs Mildly labored respirations.  Prolonged expiration but clear bilaterally.   Abdomen   Soft. Nontender. Nondistended.    Skin   Skin color normal. No rashes.   Extremities   No deformities. No peripheral edema.   Neuro   Alert and oriented. No new deficits.   Psych   Mood stable. Affect normal.         Additional Data:     Labs:  Results from last 7 days   Lab Units 01/06/24  0437 01/05/24  1820   WBC Thousand/uL 2.71* 4.76   HEMOGLOBIN g/dL 14.3 14.8   HEMATOCRIT % 41.4 43.3   PLATELETS Thousands/uL 145* 161   NEUTROS PCT %  --  70   LYMPHS PCT %  --  17   MONOS PCT %  --  13*   EOS PCT %  --  0     Results from last 7 days   Lab Units 01/06/24  0437 01/05/24  1902   SODIUM mmol/L 127* 117*   POTASSIUM mmol/L 4.5 4.8   CHLORIDE mmol/L 85* 77*   CO2 mmol/L 38* 33*   BUN mg/dL 7 14   CREATININE mg/dL 0.26* 0.36*   ANION GAP mmol/L 4 7   CALCIUM mg/dL 7.6* 8.1*   ALBUMIN g/dL  --  4.0   TOTAL BILIRUBIN mg/dL  --  0.39   ALK PHOS U/L  --  50   ALT U/L  --  25   AST U/L  --  53*   GLUCOSE RANDOM mg/dL 108 117     Results from last 7 days   Lab Units 01/06/24  0819   INR  3.76*             Results from last 7 days   Lab Units 01/06/24  0437 01/05/24  1820   LACTIC ACID mmol/L  --  1.2   PROCALCITONIN ng/ml 0.13  --        Lines/Drains:  Invasive Devices       Peripheral Intravenous Line  Duration             Peripheral IV 01/05/24 Right Antecubital <1 day                          Imaging: Personally reviewed the following imaging: chest xray    Recent Cultures (last 7 days):   Results from last 7 days   Lab  Units 01/05/24  1824 01/05/24  1820   BLOOD CULTURE  Received in Microbiology Lab. Culture in Progress. Received in Microbiology Lab. Culture in Progress.       Last 24 Hours Medication List:   Current Facility-Administered Medications   Medication Dose Route Frequency Provider Last Rate    acetaminophen  650 mg Oral Q6H PRN Mathew Rust MD      albuterol  2 puff Inhalation Q4H PRN Mathew Rust MD      cefTRIAXone  1,000 mg Intravenous Q24H Mathew Rust MD 1,000 mg (01/06/24 0546)    dexamethasone  6 mg Intravenous Q24H Mathew Rust MD      dextrose  75 mL/hr Intravenous Continuous Bautista Haider DO 75 mL/hr (01/06/24 0835)    fluticasone-vilanterol  1 puff Inhalation Daily Mathew Rust MD      magnesium sulfate  4 g Intravenous Once Bautista Haider DO 4 g (01/06/24 0835)    nicotine  1 patch Transdermal Daily Mathew Rust MD      ondansetron  4 mg Intravenous Q6H PRN Mathew Rust MD      [START ON 1/7/2024] remdesivir  100 mg Intravenous Q24H Mathew Rust MD      umeclidinium  1 puff Inhalation Daily Mathew Rust MD          Today, Patient Was Seen By: Bautista Haider DO    **Please Note: This note may have been constructed using a voice recognition system.**

## 2024-01-06 NOTE — ASSESSMENT & PLAN NOTE
Continue home inhalers  Respiratory protocol  Steroids per COVID protocol  Will likely require home oxygen evaluation on discharge

## 2024-01-06 NOTE — ASSESSMENT & PLAN NOTE
Severe hyponatremia on admission at 117  Corrected rapidly overnight to 127 with IV fluid hydration  Will place on D5 infusion to slow and reverse the rate of correction  Monitor BMP every 6 hours  Appreciate nephrology recommendations

## 2024-01-06 NOTE — RESPIRATORY THERAPY NOTE
RT Protocol Note  Loretta Partida 58 y.o. female MRN: [id number]  Unit/Bed#: ED 28 Encounter: [id number]    Assessment    Active Problems:    COVID-19    COPD (chronic obstructive pulmonary disease) ([facility])    Multiple sclerosis ([facility])    Thoracic outlet syndrome    Hyponatremia      Home Pulmonary Medications:     01/05/24 2120   Respiratory Protocol   Protocol Initiated? Yes   Protocol Selection Respiratory   Language Barrier? No   Medical & Social History Reviewed? Yes   Diagnostic Studies Reviewed? Yes   Physical Assessment Performed? Yes   Respiratory Plan Home Bronchodilator Patient pathway   Respiratory Assessment   Assessment Type Assess only   General Appearance Alert;Awake   Respiratory Pattern Normal   Chest Assessment Chest expansion symmetrical   Bilateral Breath Sounds Diminished   Cough None   Resp Comments Pt uses inhalers at home. No nebulizers, home O2 or cpap. Pt has a hx of COPD. Will oder prn inhalers   O2 Device NC   Additional Assessments   Pulse 100   Respirations (!) 24   SpO2 95 %            Past Medical History:   Diagnosis Date    COPD (chronic obstructive pulmonary disease) ([facility]) 1/5/2024    Multiple sclerosis ([facility]) 1/5/2024    Osteoporosis     Thoracic outlet syndrome 1/5/2024     Social History     Socioeconomic History    Marital status: /Civil Union     Spouse name: None    Number of children: None    Years of education: None    Highest education level: None   Occupational History    None   Tobacco Use    Smoking status: Every Day     Current packs/day: 0.50     Types: Cigarettes    Smokeless tobacco: None   Substance and Sexual Activity    Alcohol use: None    Drug use: None    Sexual activity: None   Other Topics Concern    None   Social History Narrative    None     Social Determinants of Health     Financial Resource Strain: Not on file   Food Insecurity: Not on file   Transportation Needs: Not on file   Physical Activity: Not on file   Stress: Not on file   Social Connections:  Not on file   Intimate Partner Violence: Not on file   Housing Stability: Not on file       Subjective         Objective    Physical Exam:   Assessment Type: Assess only  General Appearance: Alert, Awake  Respiratory Pattern: Normal  Chest Assessment: Chest expansion symmetrical  Bilateral Breath Sounds: Diminished  Cough: None  O2 Device: NC    Vitals:  Blood pressure 127/62, pulse 100, temperature 98.8 °F (37.1 °C), resp. rate (!) 24, weight 36.8 kg (81 lb 2.1 oz), SpO2 95%.          Imaging and other studies: I have personally reviewed pertinent reports.      O2 Device: NC     Plan    Respiratory Plan: Home Bronchodilator Patient pathway        Resp Comments: Pt uses inhalers at home. No nebulizers, home O2 or cpap. Pt has a hx of COPD. Will oder prn inhalers

## 2024-01-06 NOTE — ASSESSMENT & PLAN NOTE
Patient with risk factors with tobacco abuse as well as COPD.  COVID protocol  Remdesivir and dexamethasone  Patient's D-dimer is negative but she is already on anticoagulation  Ceftriaxone  If procalcitonin levels are negative we can discontinue the ceftriaxone

## 2024-01-06 NOTE — ASSESSMENT & PLAN NOTE
Patient is on Coumadin.   states that they do not monitor INRs.  Her INR is 3.5 so I will hold it tonight and she could restart it tomorrow

## 2024-01-06 NOTE — UTILIZATION REVIEW
Initial Clinical Review    Admission: Date/Time/Statement:   Admission Orders (From admission, onward)       Ordered        01/05/24 2107  Inpatient Admission  Once                          Orders Placed This Encounter   Procedures    Inpatient Admission     Standing Status:   Standing     Number of Occurrences:   1     Order Specific Question:   Level of Care     Answer:   Med Surg [16]     Order Specific Question:   Estimated length of stay     Answer:   More than 2 Midnights     Order Specific Question:   Certification     Answer:   I certify that inpatient services are medically necessary for this patient for a duration of greater than two midnights. See H&P and MD Progress Notes for additional information about the patient's course of treatment.     ED Arrival Information       Expected   -    Arrival   1/5/2024 17:47    Acuity   Immediate              Means of arrival   Wheelchair    Escorted by   Family Member    Service   Hospitalist    Admission type   Emergency              Arrival complaint   Respiratory problem             Chief Complaint   Patient presents with    Shortness of Breath     Increasing SOB over the past few days with nausea and small amount of vomiting.  Hx of COPD. Denies CP and fevers.        Initial Presentation: 58 y.o. female to ED presents for shortness of breath and cough. Per pt, earlier November she had in case of bronchitis however the past couple of days the patient states that she has underlying shortness of breath got progressively worse.  She vomited yesterday.  Had some chills and shortness of breath as well as some body aches. Notes nonproductive cough and feels very fatigued. O2 sat of 44% in ED and improved with O2 2L NC. PMH for MS, thoracic outlet syndrome and COPD.   Admit Inpatient level of care for COVID-19 and Hyponatremia. Iv Steriod and Iv Remdesivir. Iv antibiotics. INR 3.5, hold coumadin tonight. Na 117, check lites. BMP q6h. Nephrology consult. On exam; poor  inspiratory effort with expiratory wheezes.     Date: 1/6   Day 2:   Nephrology cons; Hyponatremia:  Possible SIADH with COPD. Na 117 which improved to 127 in 12 hrs. Continue IVF D5W with close monitoring of the sodium. Likely secondary to SIADH with COPD.  Urine electrolytes and studies still pending.     Progress notes; Acute respiratory failure with hypoxia. Currently requiring O2 5L NC. Currently requiring 5 L. Wean O2 as tolerated. Na corrected  rapidly overnight to 127 with IV fluid hydration. Will place on D5 infusion to slow and reverse the rate of correction. Monitor BMP q6h.   Continue Iv Steriod daily x 10 days and Iv Remdesivir x 5 days. Continue to monitor and titrate as needed to maintain oxygen saturation greater than 88%. Repeat Procalcitonin tomorrow. Trend inflammatory markers. Hold home Coumadin.   She is having dyspnea and shortness of breath even just with trying to eat and at rest.  Oxygen needs is up to 5 L.  She has a nonproductive cough     ED Triage Vitals   Temperature Pulse Respirations Blood Pressure SpO2   01/05/24 1840 01/05/24 1802 01/05/24 1840 01/05/24 1802 01/05/24 1806   98.8 °F (37.1 °C) 104 (!) 26 140/58 (!) 44 %      Temp Source Heart Rate Source Patient Position - Orthostatic VS BP Location FiO2 (%)   01/05/24 2257 01/05/24 1845 -- -- --   Oral Monitor         Pain Score       01/05/24 2253       No Pain          Wt Readings from Last 1 Encounters:   01/05/24 34.7 kg (76 lb 8 oz)     Additional Vital Signs:   01/06/24 08:31:17 -- 99 -- 116/68 84 85 % Abnormal  -- -- --   01/06/24 07:31:50 98.1 °F (36.7 °C) 93 -- 115/67 83 94 % -- -- --   01/05/24 2300 -- -- -- -- -- -- 40 5 L/min --   01/05/24 22:57:01 99.2 °F (37.3 °C) 103 22 143/85 104 97 % 40 5 L/min Nasal cannula   01/05/24 2214 -- 97 25 Abnormal  128/69 92 96 % -- -- --   01/05/24 2120 -- 100 24 Abnormal  -- -- 95 % -- -- --   01/05/24 2100 -- 100 25 Abnormal  127/62 89 94 % 40 5 L/min Nasal cannula     Pertinent  Labs/Diagnostic Test Results:   XR chest 1 view portable   Final Result by Campbell Gould MD (01/06 1240)      No acute cardiopulmonary disease.                  Resident: BRENDAN QUINONES I, the attending radiologist, have reviewed the images and agree with the final report above.      Workstation performed: [id number]           Results from last 7 days   Lab Units 01/05/24  1836   SARS-COV-2  Positive*     Results from last 7 days   Lab Units 01/06/24 0437 01/05/24  1820   WBC Thousand/uL 2.71* 4.76   HEMOGLOBIN g/dL 14.3 14.8   HEMATOCRIT % 41.4 43.3   PLATELETS Thousands/uL 145* 161   NEUTROS ABS Thousands/µL  --  3.31         Results from last 7 days   Lab Units 01/06/24  1237 01/06/24  0437 01/05/24  1902   SODIUM mmol/L 125* 127* 117*   POTASSIUM mmol/L 4.5 4.5 4.8   CHLORIDE mmol/L 84* 85* 77*   CO2 mmol/L 38* 38* 33*   ANION GAP mmol/L 3 4 7   BUN mg/dL 9 7 14   CREATININE mg/dL 0.30* 0.26* 0.36*   EGFR ml/min/1.73sq m 125 131 118   CALCIUM mg/dL 7.8* 7.6* 8.1*   MAGNESIUM mg/dL  --  1.3*  --    PHOSPHORUS mg/dL  --  3.5  --      Results from last 7 days   Lab Units 01/05/24  1902   AST U/L 53*   ALT U/L 25   ALK PHOS U/L 50   TOTAL PROTEIN g/dL 6.5   ALBUMIN g/dL 4.0   TOTAL BILIRUBIN mg/dL 0.39         Results from last 7 days   Lab Units 01/06/24  1237 01/06/24  0437 01/05/24  1902   GLUCOSE RANDOM mg/dL 124 108 117       Results from last 7 days   Lab Units 01/05/24  1849   PH SYD  7.332   PCO2 SDY mm Hg 63.6*   PO2 SYD mm Hg 37.6   HCO3 SYD mmol/L 32.9*   BASE EXC SYD mmol/L 4.7   O2 CONTENT SYD ml/dL 14.6   O2 HGB, VENOUS % 65.1             Results from last 7 days   Lab Units 01/05/24  2311 01/05/24  2100 01/05/24  1820   HS TNI 0HR ng/L  --   --  92*   HS TNI 2HR ng/L  --  77*  --    HSTNI D2 ng/L  --  -15  --    HS TNI 4HR ng/L 73*  --   --    HSTNI D4 ng/L -19  --   --      Results from last 7 days   Lab Units 01/05/24  7   D-DIMER QUANTITATIVE ug/ml FEU <0.27     Results from last 7  days   Lab Units 01/06/24  0819 01/06/24  0437 01/05/24  1847   PROTIME seconds 38.2* 40.4* 36.4*   INR  3.76* 4.04* 3.53*   PTT seconds  --   --  46*         Results from last 7 days   Lab Units 01/06/24  0437   PROCALCITONIN ng/ml 0.13     Results from last 7 days   Lab Units 01/05/24  1820   LACTIC ACID mmol/L 1.2             Results from last 7 days   Lab Units 01/05/24  1820   BNP pg/mL 262*       Results from last 7 days   Lab Units 01/06/24  0442   OSMO UR mmol/*     Results from last 7 days   Lab Units 01/06/24  0442   SODIUM UR  <10     Results from last 7 days   Lab Units 01/05/24  1836   INFLUENZA A PCR  Negative   INFLUENZA B PCR  Negative   RSV PCR  Negative       Results from last 7 days   Lab Units 01/05/24  1824 01/05/24  1820   BLOOD CULTURE  Received in Microbiology Lab. Culture in Progress. Received in Microbiology Lab. Culture in Progress.       ED Treatment:   Medication Administration from 01/05/2024 1747 to 01/05/2024 2248         Date/Time Order Dose Route Action     01/05/2024 1818 EST albuterol inhalation solution 10 mg 10 mg Nebulization Given     01/05/2024 1818 EST ipratropium (ATROVENT) 0.02 % inhalation solution 1 mg 1 mg Nebulization Given     01/05/2024 1818 EST sodium chloride 0.9 % inhalation solution 12 mL 12 mL Nebulization Given     01/05/2024 1841 EST methylPREDNISolone sodium succinate (Solu-MEDROL) injection 80 mg 80 mg Intravenous Given          Past Medical History:   Diagnosis Date    COPD (chronic obstructive pulmonary disease) ([facility]) 1/5/2024    Multiple sclerosis ([facility]) 1/5/2024    Osteoporosis     Thoracic outlet syndrome 1/5/2024     Present on Admission:   COVID-19   COPD (chronic obstructive pulmonary disease) ([facility])   Multiple sclerosis ([facility])   Thoracic outlet syndrome   Hyponatremia   Acute respiratory failure with hypoxia ([facility])      Admitting Diagnosis: Hyponatremia [E87.1]  SOB (shortness of breath) [R06.02]  COVID-19 [U07.1]  Age/Sex: 58 y.o.  female    Admission Orders:  Scheduled Medications:  cefTRIAXone, 1,000 mg, Intravenous, Q24H  dexamethasone, 6 mg, Intravenous, Q24H  fluticasone-vilanterol, 1 puff, Inhalation, Daily  nicotine, 1 patch, Transdermal, Daily  [START ON 1/7/2024] remdesivir, 100 mg, Intravenous, Q24H  umeclidinium, 1 puff, Inhalation, Daily      Continuous IV Infusions:  dextrose, 75 mL/hr, Intravenous, Continuous    sodium chloride 0.9 % infusion  Rate: 75 mL/hr Dose: 75 mL/hr  Freq: Continuous Route: IV  Last Dose: Stopped (01/06/24 0602)  Start: 01/06/24 0200 End: 01/06/24 0559     PRN Meds:  acetaminophen, 650 mg, Oral, Q6H PRN  albuterol, 2 puff, Inhalation, Q4H PRN  ondansetron, 4 mg, Intravenous, Q6H PRN      Bld culture x2  IP CONSULT TO NEPHROLOGY    Network Utilization Review Department  ATTENTION: Please call with any questions or concerns to [phone] and carefully listen to the prompts so that you are directed to the right person. All voicemails are confidential.   For Discharge needs, contact Care Management DC Support Team at [phone] opt. 2  Send all requests for admission clinical reviews, approved or denied determinations and any other requests to dedicated fax number below belonging to the campus where the patient is receiving treatment. List of dedicated fax numbers for the Facilities:  FACILITY NAME UR FAX NUMBER   ADMISSION DENIALS (Administrative/Medical Necessity) [phone]   DISCHARGE SUPPORT TEAM (NETWORK) [phone]   PARENT CHILD HEALTH (Maternity/NICU/Pediatrics) [phone]   [facility] [phone]   [facility] [phone]   [facility] [phone]   [facility] [phone]   [facility] [phone]   [facility] [phone]   [facility] [phone]   [facility]  [phone]   [facility] [phone]   [facility] [phone]   [facility] [phone]

## 2024-01-06 NOTE — H&P
[facility]  H&P  Name: Loretta Partida 58 y.o. female I MRN: [id number]  Unit/Bed#: ED 28 I Date of Admission: 1/5/2024   Date of Service: 1/5/2024 I Hospital Day: 0      Assessment/Plan   COPD (chronic obstructive pulmonary disease) ([facility])  Assessment & Plan  Continue home inhalers  Respiratory protocol  Steroids per COVID protocol  Will likely require home oxygen evaluation on discharge    Thoracic outlet syndrome  Assessment & Plan  Patient is on Coumadin.   states that they do not monitor INRs.  Her INR is 3.5 so I will hold it tonight and she could restart it tomorrow    Hyponatremia  Assessment & Plan  Likely hypovolemic hypovolemia  Check lites  BMP every 6 hours  Nephrology evaluation    Multiple sclerosis (HCC)  Assessment & Plan  The patient is on some pain medications and Valium as needed    COVID-19  Assessment & Plan  Patient with risk factors with tobacco abuse as well as COPD.  COVID protocol  Remdesivir and dexamethasone  Patient's D-dimer is negative but she is already on anticoagulation  Ceftriaxone  If procalcitonin levels are negative we can discontinue the ceftriaxone       VTE Pharmacologic Prophylaxis:   High Risk (Score >/= 5) - Pharmacological DVT Prophylaxis Ordered: warfarin (Coumadin). Sequential Compression Devices Ordered.  Code Status: No Order full code  Discussion with family: Updated  () at bedside.    Anticipated Length of Stay: Patient will be admitted on an inpatient basis with an anticipated length of stay of greater than 2 midnights secondary to acute hypoxemic respiratory failure secondary to COVID.    Total Time Spent on Date of Encounter in care of patient: 60 mins. This time was spent on one or more of the following: performing physical exam; counseling and coordination of care; obtaining or reviewing history; documenting in the medical record; reviewing/ordering tests, medications or procedures; communicating with other  healthcare professionals and discussing with patient's family/caregivers.    Chief Complaint: Shortness of breath    History of Present Illness:  Loretta Partida is a 58 y.o. female with a PMH of MS as well as thoracic outlet syndrome and COPD who presents with shortness of breath and cough.  Patient came with shortness of breath and cough.  The patient states that earlier November she had in case of bronchitis however the past couple of days the patient states that she has underlying shortness of breath got progressively worse.  She vomited yesterday.  Had some chills and shortness of breath as well as some body aches.  Patient has been having a nonproductive cough.  She also feels very fatigued.  She did have breakfast this morning but her appetite otherwise has been diminished..  Apparently the patient had an oxygen saturation of 44% when she came to the emergency department and she improved with 2 L    Review of Systems:  Review of Systems   Constitutional:  Positive for activity change, appetite change, chills, diaphoresis, fatigue and fever.   Respiratory:  Positive for shortness of breath and wheezing.    Gastrointestinal:  Positive for nausea and vomiting.   Neurological:  Positive for weakness.   All other systems reviewed and are negative.      Past Medical and Surgical History:   Past Medical History:   Diagnosis Date    COPD (chronic obstructive pulmonary disease) (HCC) 1/5/2024    Multiple sclerosis (HCC) 1/5/2024    Osteoporosis     Thoracic outlet syndrome 1/5/2024       Past Surgical History:   Procedure Laterality Date    THORACIC OUTLET SURGERY         Meds/Allergies:  Prior to Admission medications    Not on File     I have reviewed home medications with patient family member.    Allergies: Not on File    Social History:  Marital Status: /Civil Union     Patient Pre-hospital Living Situation: Home  Patient Pre-hospital Level of Mobility: walks  Patient Pre-hospital Diet Restrictions:  None  Substance Use History:   Social History     Substance and Sexual Activity   Alcohol Use None     Social History     Tobacco Use   Smoking Status Every Day    Current packs/day: 0.50    Types: Cigarettes   Smokeless Tobacco Not on file     Social History     Substance and Sexual Activity   Drug Use Not on file       Family History:  Family History   Problem Relation Age of Onset    No Known Problems Mother     No Known Problems Father        Physical Exam:     Vitals:   Blood Pressure: 126/65 (01/05/24 2000)  Pulse: 101 (01/05/24 2000)  Temperature: 98.8 °F (37.1 °C) (01/05/24 1840)  Respirations: (!) 25 (01/05/24 2000)  Weight - Scale: 36.8 kg (81 lb 2.1 oz) (01/05/24 1842)  SpO2: 94 % (01/05/24 2000)    Physical Exam   General Appearance:    Alert, cooperative, no distress, appears stated age   Head:    Normocephalic, without obvious abnormality, atraumatic   Eyes:    PERRL, conjunctiva/corneas clear, EOM's intact,             Nose:   Nares normal, septum midline, mucosa normal   Throat:   Lips, mucosa, and tongue normal; teeth and gums normal   Neck:   Supple, symmetrical, no adenopathy;        thyroid:  No enlargement/tenderness/nodules; no carotid    bruit or JVD   Back:     Symmetric, no curvature, ROM normal, no CVA tenderness   Lungs:   Poor inspiratory effort with expiratory wheezes       Heart:    Regular rate and rhythm, S1 and S2 normal, no murmur, rub    or gallop   Abdomen:     Soft, non-tender, bowel sounds active all four quadrants,     no masses, no organomegaly           Extremities:   Extremities normal, atraumatic, no cyanosis or edema   Pulses:   2+ and symmetric all extremities   Skin:   Skin color, texture, turgor normal, no rashes or lesions   Lymph nodes:   Cervical, supraclavicular, and axillary nodes normal   Neurologic:   CNII-XII intact. Normal strength, sensation and reflexes       throughout         Additional Data:     Lab Results:  Results from last 7 days   Lab Units  01/05/24  1820   WBC Thousand/uL 4.76   HEMOGLOBIN g/dL 14.8   HEMATOCRIT % 43.3   PLATELETS Thousands/uL 161   NEUTROS PCT % 70   LYMPHS PCT % 17   MONOS PCT % 13*   EOS PCT % 0     Results from last 7 days   Lab Units 01/05/24  1902   SODIUM mmol/L 117*   POTASSIUM mmol/L 4.8   CHLORIDE mmol/L 77*   CO2 mmol/L 33*   BUN mg/dL 14   CREATININE mg/dL 0.36*   ANION GAP mmol/L 7   CALCIUM mg/dL 8.1*   ALBUMIN g/dL 4.0   TOTAL BILIRUBIN mg/dL 0.39   ALK PHOS U/L 50   ALT U/L 25   AST U/L 53*   GLUCOSE RANDOM mg/dL 117     Results from last 7 days   Lab Units 01/05/24  1847   INR  3.53*             Results from last 7 days   Lab Units 01/05/24  1820   LACTIC ACID mmol/L 1.2       Lines/Drains:  Invasive Devices       Peripheral Intravenous Line  Duration             Peripheral IV 01/05/24 Right Antecubital <1 day                        Imaging: Reviewed radiology reports from this admission including: chest xray  XR chest 1 view portable    (Results Pending)       EKG and Other Studies Reviewed on Admission:   EKG: No EKG obtained.    ** Please Note: This note has been constructed using a voice recognition system. **

## 2024-01-06 NOTE — ASSESSMENT & PLAN NOTE
Patient presented with progressive shortness of breath and dyspnea and found to be positive for COVID in the emergency room  This is superimposed on baseline COPD, active smoking    Continue IV remdesivir x 5 days  IV Decadron 6 mg daily x 10 days  Wean O2 as tolerated  D-dimer negative though patient already on Coumadin; will continue  Procalcitonin negative x 1; will recheck tomorrow a.m. and if negative discontinue ceftriaxone  Trend inflammatory markers

## 2024-01-06 NOTE — MALNUTRITION/BMI
This medical record reflects one or more clinical indicators suggestive of malnutrition and underweight.    Malnutrition Findings:   Adult Malnutrition type: Chronic illness  Adult Degree of Malnutrition: Other severe protein calorie malnutrition  Malnutrition Characteristics: Inadequate energy, Weight loss                  360 Statement: Other severe malnutrition related to increased energy-protein needs in the context of chronic illness (COPD, MS) as evidenced by consuming < 75% of energy intake compared to estimated needs for > 1 month and reported 24% weight loss in 1 year. Treated with diet and ONS.    BMI Findings:  Adult BMI Classifications: Underweight < 18.5        Body mass index is 13.55 kg/m².     See Nutrition note dated 1/6/2024 for additional details.  Completed nutrition assessment is viewable in the nutrition documentation.

## 2024-01-06 NOTE — ASSESSMENT & PLAN NOTE
Patient is on Coumadin  Holding home Coumadin currently secondary to supratherapeutic INR  Trend daily INR; when within therapeutic range will restart at home dose

## 2024-01-06 NOTE — CONSULTS
Consultation - Nephrology   Loretta Partida 58 y.o. female MRN: [id number]  Unit/Bed#: -01 Encounter: [id number]    Referring PHYSICIAN: Caire Wahl    REASON FOR THE CONSULTATION: Hyponatremia    DATE OF CONSULTATION: January 6, 2024    ADMISSION DIAGNOSIS: <principal problem not specified>     CHIEF COMPLAINT     Patient is a COPD came to the hospital overall not feeling well and admitted with COVID-19 infection and hyponatremia    HPI     Patient is a COPD and other medical problems including multiple sclerosis and thoracic outlet syndrome    Patient was getting short of breath and not feeling well so came to emergency room where she was found to be hyponatremic with COVID-19 infection    Patient still hypoxic requiring oxygen    Does not appear in any acute distress except short of breath    Patient claims she does have urology a problem with kidney and ureter requiring surgery though she is not being monitored by neurologist here    Patient also history of COPD    Overall feeling weak and tired    PAST MEDICAL HISTORY     Past Medical History:   Diagnosis Date    COPD (chronic obstructive pulmonary disease) ([facility]) 1/5/2024    Multiple sclerosis ([facility]) 1/5/2024    Osteoporosis     Thoracic outlet syndrome 1/5/2024       PAST SURGICAL HISTORY     Past Surgical History:   Procedure Laterality Date    THORACIC OUTLET SURGERY         ALLERGIES     No Known Allergies    SOCIAL HISTORY     Social History     Substance and Sexual Activity   Alcohol Use Not Currently     Social History     Substance and Sexual Activity   Drug Use Never     Social History     Tobacco Use   Smoking Status Every Day    Current packs/day: 0.50    Average packs/day: 0.5 packs/day for 30.0 years (15.0 ttl pk-yrs)    Types: Cigarettes    Start date: 1/5/1994   Smokeless Tobacco Never       FAMILY HISTORY     Family History   Problem Relation Age of Onset    No Known Problems Mother     No Known Problems Father        CURRENT MEDICATIONS        Current Facility-Administered Medications:     acetaminophen (TYLENOL) tablet 650 mg, 650 mg, Oral, Q6H PRN, Mathew Rust MD    albuterol (PROVENTIL HFA,VENTOLIN HFA) inhaler 2 puff, 2 puff, Inhalation, Q4H PRN, Mathew Rust MD    cefTRIAXone (ROCEPHIN) IVPB (premix in dextrose) 1,000 mg 50 mL, 1,000 mg, Intravenous, Q24H, Mathew Rust MD, Last Rate: 100 mL/hr at 01/06/24 0546, 1,000 mg at 01/06/24 0546    dexamethasone (PF) (DECADRON) injection 6 mg, 6 mg, Intravenous, Q24H, Mathew Rust MD, 6 mg at 01/06/24 0249    dextrose 5 % infusion, 75 mL/hr, Intravenous, Continuous, Bautista Haider DO, Last Rate: 75 mL/hr at 01/06/24 0835, 75 mL/hr at 01/06/24 0835    fluticasone-vilanterol 200-25 mcg/actuation 1 puff, 1 puff, Inhalation, Daily, Mathew Rust MD    magnesium sulfate 4 g/100 mL IVPB (premix) 4 g, 4 g, Intravenous, Once, Bautista Haider DO, Last Rate: 25 mL/hr at 01/06/24 0835, 4 g at 01/06/24 0835    nicotine (NICODERM CQ) 21 mg/24 hr TD 24 hr patch 1 patch, 1 patch, Transdermal, Daily, Mathew Rust MD    ondansetron (ZOFRAN) injection 4 mg, 4 mg, Intravenous, Q6H PRN, Mathew Rust MD    [COMPLETED] remdesivir (Veklury) 200 mg in sodium chloride 0.9 % 290 mL IVPB, 200 mg, Intravenous, Q24H, 200 mg at 01/06/24 0243 **FOLLOWED BY** [START ON 1/7/2024] remdesivir (Veklury) 100 mg in sodium chloride 0.9 % 270 mL IVPB, 100 mg, Intravenous, Q24H, Mathew Rust MD    umeclidinium 62.5 mcg/actuation inhaler AEPB 1 puff, 1 puff, Inhalation, Daily, Mathew Rust MD    REVIEW OF SYSTEMS     Review of Systems   Constitutional:  Positive for fatigue.   HENT:  Negative for congestion.    Eyes:  Negative for photophobia and pain.   Respiratory:  Positive for shortness of breath. Negative for chest tightness.    Cardiovascular:  Negative for chest pain and palpitations.   Gastrointestinal:  Negative for abdominal distention, abdominal pain and blood in  stool.   Endocrine: Negative for polydipsia.   Genitourinary:  Negative for difficulty urinating, dysuria, flank pain, hematuria and urgency.   Musculoskeletal:  Negative for arthralgias and back pain.   Skin:  Negative for rash.   Neurological:  Negative for dizziness, light-headedness and headaches.   Hematological:  Does not bruise/bleed easily.   Psychiatric/Behavioral:  Negative for behavioral problems. The patient is not nervous/anxious.        LAB RESULTS        Results from last 7 days   Lab Units 01/06/24  0437 01/05/24  1902 01/05/24  1820   WBC Thousand/uL 2.71*  --  4.76   HEMOGLOBIN g/dL 14.3  --  14.8   HEMATOCRIT % 41.4  --  43.3   PLATELETS Thousands/uL 145*  --  161   POTASSIUM mmol/L 4.5 4.8  --    CHLORIDE mmol/L 85* 77*  --    CO2 mmol/L 38* 33*  --    BUN mg/dL 7 14  --    CREATININE mg/dL 0.26* 0.36*  --    EGFR ml/min/1.73sq m 131 118  --    CALCIUM mg/dL 7.6* 8.1*  --    MAGNESIUM mg/dL 1.3*  --   --    PHOSPHORUS mg/dL 3.5  --   --        I have personally reviewed the old medical records and patient's previously known baseline creatinine level is ~patient creatinine is normal but does have fluctuating sodium in the past with baseline about 132 which very related to COPD    RADIOLOGY RESULTS     No results found for this or any previous visit.    No results found for this or any previous visit.    No results found for this or any previous visit.    No results found for this or any previous visit.    No results found for this or any previous visit.    No results found for this or any previous visit.      OBJECTIVE     Current Weight: Weight - Scale: 34.7 kg (76 lb 8 oz)  Vitals:    01/06/24 0831   BP: 116/68   Pulse: 99   Resp:    Temp:    SpO2: (!) 85%       Intake/Output Summary (Last 24 hours) at 1/6/2024 1041  Last data filed at 1/6/2024 0900  Gross per 24 hour   Intake 120 ml   Output 800 ml   Net -680 ml       PHYSICAL EXAMINATION     Physical Exam  Constitutional:       General: She is  "not in acute distress.     Appearance: She is well-developed.   HENT:      Head: Normocephalic.      Mouth/Throat:      Mouth: Mucous membranes are moist.   Eyes:      General: No scleral icterus.     Conjunctiva/sclera: Conjunctivae normal.   Neck:      Vascular: No JVD.   Cardiovascular:      Rate and Rhythm: Normal rate.      Heart sounds: Normal heart sounds.   Pulmonary:      Effort: Pulmonary effort is normal.      Breath sounds: No wheezing.   Abdominal:      Palpations: Abdomen is soft.      Tenderness: There is no abdominal tenderness.   Musculoskeletal:         General: Normal range of motion.      Cervical back: Neck supple.   Skin:     General: Skin is warm.      Findings: No rash.   Neurological:      Mental Status: She is alert and oriented to person, place, and time.   Psychiatric:         Behavior: Behavior normal.          PLAN / RECOMMENDATIONS      Hyponatremia: Possible SIADH with COPD.  Patient's sodium is about 132.  Patient came with sodium 117 which went up to 127 in 12 hours set patient still getting D5W with close monitoring of the sodium.    Likely secondary to SIADH with COPD.  Urine electrolytes and studies still pending.  At this point we will monitor sodium closely and treat with fluid restriction for now.  Patient is not symptomatic with hyponatremia    COVID-19 infection: Patient is hypoxic and being treated aggressively for this    COPD: Overall seems to be stable at home    Will monitor with you closely    Thank you for the consultation to participate in patient's care. I have personally discussed my plan with the referring physician.     Jus Eden MD  Nephrology  1/6/2024        Portions of the record may have been created with voice recognition software. Occasional wrong word or \"sound a like\" substitutions may have occurred due to the inherent limitations of voice recognition software. Read the chart carefully and recognize, using context, where substitutions have occurred.  "

## 2024-01-07 PROBLEM — R78.81 POSITIVE BLOOD CULTURE: Status: ACTIVE | Noted: 2024-01-07

## 2024-01-07 PROBLEM — E83.42 HYPOMAGNESEMIA: Status: ACTIVE | Noted: 2024-01-07

## 2024-01-07 PROBLEM — E43 SEVERE PROTEIN-CALORIE MALNUTRITION (HCC): Status: ACTIVE | Noted: 2024-01-07

## 2024-01-07 LAB
ANION GAP SERPL CALCULATED.3IONS-SCNC: 1 MMOL/L
BUN SERPL-MCNC: 11 MG/DL (ref 5–25)
BUN SERPL-MCNC: 6 MG/DL (ref 5–25)
BUN SERPL-MCNC: 6 MG/DL (ref 5–25)
CALCIUM SERPL-MCNC: 6.9 MG/DL (ref 8.4–10.2)
CALCIUM SERPL-MCNC: 7 MG/DL (ref 8.4–10.2)
CALCIUM SERPL-MCNC: 7.5 MG/DL (ref 8.4–10.2)
CHLORIDE SERPL-SCNC: 86 MMOL/L (ref 96–108)
CHLORIDE SERPL-SCNC: 88 MMOL/L (ref 96–108)
CHLORIDE SERPL-SCNC: 89 MMOL/L (ref 96–108)
CO2 SERPL-SCNC: 39 MMOL/L (ref 21–32)
CO2 SERPL-SCNC: 39 MMOL/L (ref 21–32)
CO2 SERPL-SCNC: 44 MMOL/L (ref 21–32)
CREAT SERPL-MCNC: 0.22 MG/DL (ref 0.6–1.3)
CREAT SERPL-MCNC: 0.22 MG/DL (ref 0.6–1.3)
CREAT SERPL-MCNC: 0.4 MG/DL (ref 0.6–1.3)
CRP SERPL QL: <1 MG/L
ERYTHROCYTE [DISTWIDTH] IN BLOOD BY AUTOMATED COUNT: 11.9 % (ref 11.6–15.1)
GFR SERPL CREATININE-BSD FRML MDRD: 114 ML/MIN/1.73SQ M
GFR SERPL CREATININE-BSD FRML MDRD: 139 ML/MIN/1.73SQ M
GFR SERPL CREATININE-BSD FRML MDRD: 139 ML/MIN/1.73SQ M
GLUCOSE SERPL-MCNC: 137 MG/DL (ref 65–140)
GLUCOSE SERPL-MCNC: 143 MG/DL (ref 65–140)
GLUCOSE SERPL-MCNC: 90 MG/DL (ref 65–140)
HCT VFR BLD AUTO: 37.5 % (ref 34.8–46.1)
HGB BLD-MCNC: 12.4 G/DL (ref 11.5–15.4)
INR PPP: 2.5 (ref 0.84–1.19)
MAGNESIUM SERPL-MCNC: 1.5 MG/DL (ref 1.9–2.7)
MCH RBC QN AUTO: 32.9 PG (ref 26.8–34.3)
MCHC RBC AUTO-ENTMCNC: 33.1 G/DL (ref 31.4–37.4)
MCV RBC AUTO: 100 FL (ref 82–98)
PLATELET # BLD AUTO: 149 THOUSANDS/UL (ref 149–390)
PMV BLD AUTO: 9.8 FL (ref 8.9–12.7)
POTASSIUM SERPL-SCNC: 3.4 MMOL/L (ref 3.5–5.3)
POTASSIUM SERPL-SCNC: 3.4 MMOL/L (ref 3.5–5.3)
POTASSIUM SERPL-SCNC: 3.9 MMOL/L (ref 3.5–5.3)
PROCALCITONIN SERPL-MCNC: 0.05 NG/ML
PROTHROMBIN TIME: 27.9 SECONDS (ref 11.6–14.5)
RBC # BLD AUTO: 3.77 MILLION/UL (ref 3.81–5.12)
SODIUM SERPL-SCNC: 128 MMOL/L (ref 135–147)
SODIUM SERPL-SCNC: 129 MMOL/L (ref 135–147)
SODIUM SERPL-SCNC: 131 MMOL/L (ref 135–147)
WBC # BLD AUTO: 4.7 THOUSAND/UL (ref 4.31–10.16)

## 2024-01-07 PROCEDURE — 94664 DEMO&/EVAL PT USE INHALER: CPT

## 2024-01-07 PROCEDURE — XW0DXM6 INTRODUCTION OF BARICITINIB INTO MOUTH AND PHARYNX, EXTERNAL APPROACH, NEW TECHNOLOGY GROUP 6: ICD-10-PCS | Performed by: STUDENT IN AN ORGANIZED HEALTH CARE EDUCATION/TRAINING PROGRAM

## 2024-01-07 PROCEDURE — 94640 AIRWAY INHALATION TREATMENT: CPT

## 2024-01-07 PROCEDURE — 94760 N-INVAS EAR/PLS OXIMETRY 1: CPT

## 2024-01-07 PROCEDURE — 85610 PROTHROMBIN TIME: CPT | Performed by: FAMILY MEDICINE

## 2024-01-07 PROCEDURE — 99232 SBSQ HOSP IP/OBS MODERATE 35: CPT | Performed by: INTERNAL MEDICINE

## 2024-01-07 PROCEDURE — 87040 BLOOD CULTURE FOR BACTERIA: CPT | Performed by: INTERNAL MEDICINE

## 2024-01-07 PROCEDURE — 85027 COMPLETE CBC AUTOMATED: CPT | Performed by: INTERNAL MEDICINE

## 2024-01-07 PROCEDURE — 86140 C-REACTIVE PROTEIN: CPT | Performed by: INTERNAL MEDICINE

## 2024-01-07 PROCEDURE — 84145 PROCALCITONIN (PCT): CPT | Performed by: INTERNAL MEDICINE

## 2024-01-07 PROCEDURE — 80048 BASIC METABOLIC PNL TOTAL CA: CPT | Performed by: FAMILY MEDICINE

## 2024-01-07 PROCEDURE — 99233 SBSQ HOSP IP/OBS HIGH 50: CPT | Performed by: INTERNAL MEDICINE

## 2024-01-07 PROCEDURE — 83735 ASSAY OF MAGNESIUM: CPT | Performed by: INTERNAL MEDICINE

## 2024-01-07 PROCEDURE — 80048 BASIC METABOLIC PNL TOTAL CA: CPT | Performed by: INTERNAL MEDICINE

## 2024-01-07 RX ORDER — POTASSIUM CHLORIDE 20 MEQ/1
40 TABLET, EXTENDED RELEASE ORAL ONCE
Status: COMPLETED | OUTPATIENT
Start: 2024-01-07 | End: 2024-01-07

## 2024-01-07 RX ORDER — LEVALBUTEROL INHALATION SOLUTION 1.25 MG/3ML
1.25 SOLUTION RESPIRATORY (INHALATION)
Status: DISCONTINUED | OUTPATIENT
Start: 2024-01-07 | End: 2024-01-13 | Stop reason: HOSPADM

## 2024-01-07 RX ORDER — VANCOMYCIN HYDROCHLORIDE 500 MG/100ML
15 INJECTION, SOLUTION INTRAVENOUS EVERY 12 HOURS
Status: DISCONTINUED | OUTPATIENT
Start: 2024-01-07 | End: 2024-01-07

## 2024-01-07 RX ORDER — LEVALBUTEROL INHALATION SOLUTION 1.25 MG/3ML
1.25 SOLUTION RESPIRATORY (INHALATION) EVERY 8 HOURS PRN
Status: DISCONTINUED | OUTPATIENT
Start: 2024-01-07 | End: 2024-01-07

## 2024-01-07 RX ORDER — WARFARIN SODIUM 5 MG/1
5 TABLET ORAL
Status: DISCONTINUED | OUTPATIENT
Start: 2024-01-07 | End: 2024-01-13 | Stop reason: HOSPADM

## 2024-01-07 RX ORDER — MAGNESIUM SULFATE HEPTAHYDRATE 40 MG/ML
4 INJECTION, SOLUTION INTRAVENOUS ONCE
Status: COMPLETED | OUTPATIENT
Start: 2024-01-07 | End: 2024-01-07

## 2024-01-07 RX ORDER — VANCOMYCIN HYDROCHLORIDE 500 MG/100ML
15 INJECTION, SOLUTION INTRAVENOUS ONCE
Status: COMPLETED | OUTPATIENT
Start: 2024-01-07 | End: 2024-01-07

## 2024-01-07 RX ORDER — VANCOMYCIN HYDROCHLORIDE 750 MG/150ML
750 INJECTION, SOLUTION INTRAVENOUS EVERY 8 HOURS
Status: DISCONTINUED | OUTPATIENT
Start: 2024-01-07 | End: 2024-01-08

## 2024-01-07 RX ORDER — BUDESONIDE 0.5 MG/2ML
0.5 INHALANT ORAL
Status: DISCONTINUED | OUTPATIENT
Start: 2024-01-07 | End: 2024-01-13 | Stop reason: HOSPADM

## 2024-01-07 RX ADMIN — BARICITINIB 4 MG: 2 TABLET, FILM COATED ORAL at 12:19

## 2024-01-07 RX ADMIN — LEVALBUTEROL HYDROCHLORIDE 1.25 MG: 1.25 SOLUTION RESPIRATORY (INHALATION) at 20:05

## 2024-01-07 RX ADMIN — LEVALBUTEROL HYDROCHLORIDE 1.25 MG: 1.25 SOLUTION RESPIRATORY (INHALATION) at 13:15

## 2024-01-07 RX ADMIN — MAGNESIUM SULFATE HEPTAHYDRATE 4 G: 40 INJECTION, SOLUTION INTRAVENOUS at 12:12

## 2024-01-07 RX ADMIN — VANCOMYCIN HYDROCHLORIDE 750 MG: 750 INJECTION, SOLUTION INTRAVENOUS at 13:23

## 2024-01-07 RX ADMIN — CEFTRIAXONE 1000 MG: 1 INJECTION, SOLUTION INTRAVENOUS at 04:17

## 2024-01-07 RX ADMIN — IPRATROPIUM BROMIDE 0.5 MG: 0.5 SOLUTION RESPIRATORY (INHALATION) at 20:05

## 2024-01-07 RX ADMIN — VANCOMYCIN HYDROCHLORIDE 500 MG: 500 INJECTION, SOLUTION INTRAVENOUS at 05:20

## 2024-01-07 RX ADMIN — LEVALBUTEROL HYDROCHLORIDE 1.25 MG: 1.25 SOLUTION RESPIRATORY (INHALATION) at 10:36

## 2024-01-07 RX ADMIN — REMDESIVIR 100 MG: 100 INJECTION, POWDER, LYOPHILIZED, FOR SOLUTION INTRAVENOUS at 03:25

## 2024-01-07 RX ADMIN — VANCOMYCIN HYDROCHLORIDE 750 MG: 750 INJECTION, SOLUTION INTRAVENOUS at 20:52

## 2024-01-07 RX ADMIN — BUDESONIDE INHALATION 0.5 MG: 0.5 SUSPENSION RESPIRATORY (INHALATION) at 10:36

## 2024-01-07 RX ADMIN — IPRATROPIUM BROMIDE 0.5 MG: 0.5 SOLUTION RESPIRATORY (INHALATION) at 13:12

## 2024-01-07 RX ADMIN — POTASSIUM CHLORIDE 40 MEQ: 1500 TABLET, EXTENDED RELEASE ORAL at 12:19

## 2024-01-07 RX ADMIN — WARFARIN SODIUM 5 MG: 5 TABLET ORAL at 17:58

## 2024-01-07 RX ADMIN — IPRATROPIUM BROMIDE 0.5 MG: 0.5 SOLUTION RESPIRATORY (INHALATION) at 10:36

## 2024-01-07 RX ADMIN — DEXAMETHASONE SODIUM PHOSPHATE 6 MG: 10 INJECTION, SOLUTION INTRAMUSCULAR; INTRAVENOUS at 04:17

## 2024-01-07 RX ADMIN — BUDESONIDE INHALATION 0.5 MG: 0.5 SUSPENSION RESPIRATORY (INHALATION) at 20:05

## 2024-01-07 NOTE — RESPIRATORY THERAPY NOTE
RT Protocol Note  Loretta Partida 58 y.o. female MRN: [id number]  Unit/Bed#: -01 Encounter: [id number]    Assessment    Principal Problem:    Acute respiratory failure with hypoxia (HCC)  Active Problems:    COVID-19    COPD (chronic obstructive pulmonary disease) (HCC)    Multiple sclerosis (HCC)    Thoracic outlet syndrome    Hyponatremia      Home Pulmonary Medications:     01/06/24 2212   Respiratory Protocol   Protocol Initiated? No   Protocol Selection Respiratory   Language Barrier? Yes   Medical & Social History Reviewed? Yes   Diagnostic Studies Reviewed? Yes   Physical Assessment Performed? Yes   Respiratory Plan Home Bronchodilator Patient pathway   Respiratory Assessment   Assessment Type Assess only   General Appearance Alert;Awake   Respiratory Pattern Normal   Chest Assessment Chest expansion symmetrical   Bilateral Breath Sounds Diminished   Cough Non-productive   Resp Comments Pt remains on MFNC   O2 Device MFNC   Additional Assessments   Pulse 97   Respirations 18   SpO2 97 %            Past Medical History:   Diagnosis Date    COPD (chronic obstructive pulmonary disease) (HCC) 1/5/2024    Multiple sclerosis (HCC) 1/5/2024    Osteoporosis     Thoracic outlet syndrome 1/5/2024     Social History     Socioeconomic History    Marital status: /Civil Union     Spouse name: None    Number of children: None    Years of education: None    Highest education level: None   Occupational History    None   Tobacco Use    Smoking status: Every Day     Current packs/day: 0.50     Average packs/day: 0.5 packs/day for 30.0 years (15.0 ttl pk-yrs)     Types: Cigarettes     Start date: 1/5/1994    Smokeless tobacco: Never   Substance and Sexual Activity    Alcohol use: Not Currently    Drug use: Never    Sexual activity: None   Other Topics Concern    None   Social History Narrative    None     Social Determinants of Health     Financial Resource Strain: Not on file   Food Insecurity: Not on file  "  Transportation Needs: Not on file   Physical Activity: Not on file   Stress: Not on file   Social Connections: Not on file   Intimate Partner Violence: Not on file   Housing Stability: Not on file       Subjective         Objective    Physical Exam:   Assessment Type: Assess only  General Appearance: Alert, Awake  Respiratory Pattern: Normal  Chest Assessment: Chest expansion symmetrical  Bilateral Breath Sounds: Diminished  Cough: Non-productive  O2 Device: MFNC    Vitals:  Blood pressure 118/68, pulse 97, temperature 97.8 °F (36.6 °C), resp. rate 18, height 5' 3\" (1.6 m), weight 34.7 kg (76 lb 8 oz), SpO2 97%.          Imaging and other studies: I have personally reviewed pertinent reports.      O2 Device: MFNC     Plan    Respiratory Plan: Home Bronchodilator Patient pathway        Resp Comments: Pt remains on MFNC   "

## 2024-01-07 NOTE — ASSESSMENT & PLAN NOTE
Severe hyponatremia on admission at 117; probably secondary to SIADH  Now improving with fluid restriction  Currently sodium 132.  Continue to monitor daily BMP  Nephrology recommendation appreciated.

## 2024-01-07 NOTE — ASSESSMENT & PLAN NOTE
Acute respiratory failure with hypoxia secondary to COVID-19 infection superimposed on baseline COPD  Currently over saturation 91 to 93% on 4 to 5 L nasal cannula.  Continue to monitor and titrate as needed to maintain oxygen saturation greater than 88%  See treatment plan below for COVID

## 2024-01-07 NOTE — PROGRESS NOTES
Loretta Partida is a 58 y.o. female who is currently ordered Vancomycin IV with management by the Pharmacy Consult service.  Relevant clinical data and objective / subjective history reviewed.  Vancomycin Assessment:  Indication and Goal AUC/Trough: Bacteremia (goal -600, trough >10)  Clinical Status:  new start  Micro:     Renal Function:  SCr: 0.22 mg/dL  CrCl: 149.1 mL/min  Renal replacement: Not on dialysis  Days of Therapy: 1  Current Dose: 500mg IV q12h  Vancomycin Plan:  New Dosinmg IV q8h  Estimated AUC: 422 mcg*hr/mL  Estimated Trough: 10.7 mcg/mL  Next Level: 24 trough level at 0430   Renal Function Monitoring: Daily BMP and UOP  Pharmacy will continue to follow closely for s/sx of nephrotoxicity, infusion reactions and appropriateness of therapy.  BMP and CBC will be ordered per protocol. We will continue to follow the patient’s culture results and clinical progress daily.    Negin Dempsey, Pharmacist

## 2024-01-07 NOTE — ASSESSMENT & PLAN NOTE
Malnutrition Findings:   Adult Malnutrition type: Chronic illness  Adult Degree of Malnutrition: Other severe protein calorie malnutrition  Malnutrition Characteristics: Inadequate energy, Weight loss                  360 Statement: Other severe malnutrition related to increased energy-protein needs in the context of chronic illness (COPD, MS) as evidenced by consuming < 75% of energy intake compared to estimated needs for > 1 month and reported 24% weight loss in 1 year. Treated with diet and ONS.    BMI Findings:  Adult BMI Classifications: Underweight < 18.5        Body mass index is 13.55 kg/m².

## 2024-01-07 NOTE — ASSESSMENT & PLAN NOTE
INR subtherapeutic on 1/8; will initiate heparin drip  Monitor PTT per protocol  Continue Coumadin 5 mg daily with daily INR checks

## 2024-01-07 NOTE — PROGRESS NOTES
Formerly [facility], [facility]  Progress Note  Name: Loretta Partida I  MRN: [id number]  Unit/Bed#: -01 I Date of Admission: 1/5/2024   Date of Service: 1/7/2024 I Hospital Day: 2    Assessment/Plan   * Acute respiratory failure with hypoxia (HCC)  Assessment & Plan  Acute respiratory failure with hypoxia secondary to COVID-19 infection superimposed on baseline COPD  Currently requiring 11 L via mid flow  Continue to monitor and titrate as needed to maintain oxygen saturation greater than 88%  See treatment plan below for COVID    Hyponatremia  Assessment & Plan  Severe hyponatremia on admission at 117; corrected rapidly overnight and was placed on D5 infusion.  Appreciate nephrology recommendations; will start fluid restriction and continue to monitor    COVID-19  Assessment & Plan  Patient presented with progressive shortness of breath and dyspnea and found to be positive for COVID in the emergency room  This is superimposed on baseline COPD, active smoking    Continue IV remdesivir x 5 days  IV Decadron 6 mg daily x 10 days  Will add baricitinib x 14 days  Wean O2 as tolerated  Continue Coumadin for now; if she clinically worsens would hold Coumadin transition heparin drip  Procalcitonin negative x 2; will discontinue ceftriaxone  Trend inflammatory markers    Hypomagnesemia  Assessment & Plan  Replace with IV mag 4 g today  Recheck in the morning    Positive blood culture  Assessment & Plan  1 out of 2 blood cultures on admission growing GPC  Nothing showed up on blood identification panel  Will continue empiric vancomycin for now  Follow-up final speciation and repeat cultures    Severe protein-calorie malnutrition (HCC)  Assessment & Plan  Malnutrition Findings:   Adult Malnutrition type: Chronic illness  Adult Degree of Malnutrition: Other severe protein calorie malnutrition  Malnutrition Characteristics: Inadequate energy, Weight loss                  360 Statement: Other severe malnutrition related to  increased energy-protein needs in the context of chronic illness (COPD, MS) as evidenced by consuming < 75% of energy intake compared to estimated needs for > 1 month and reported 24% weight loss in 1 year. Treated with diet and ONS.    BMI Findings:  Adult BMI Classifications: Underweight < 18.5        Body mass index is 13.55 kg/m².       Thoracic outlet syndrome  Assessment & Plan  Patient is on Coumadin  Will continue Coumadin for now with goal INR 2.0-3.0  Should patient continue to clinically worsen would consider stopping Coumadin and transitioning heparin drip  Monitor daily INR    Multiple sclerosis (HCC)  Assessment & Plan  The patient is on some pain medications and Valium as needed    COPD (chronic obstructive pulmonary disease) (HCC)  Assessment & Plan  Continue home inhaler regimen  Very poor air movement, suspect severe underlying disease  Will discontinue inhalers and change to nebulized regimen  Started on nebulized Atrovent, Xopenex, budesonide  IV Decadron per COVID protocol        VTE Pharmacologic Prophylaxis: VTE Score: 5 High Risk (Score >/= 5) - Pharmacological DVT Prophylaxis Ordered: warfarin (Coumadin). Sequential Compression Devices Ordered.    Mobility:   Basic Mobility Inpatient Raw Score: 20  JH-HLM Goal: 6: Walk 10 steps or more  JH-HLM Achieved: 6: Walk 10 steps or more  HLM Goal NOT achieved. Continue with multidisciplinary rounding and encourage appropriate mobility to improve upon HLM goals.    Patient Centered Rounds: I performed bedside rounds with nursing staff today.   Discussions with Specialists or Other Care Team Provider: BENITO. Nephrology.  Respiratory therapy.    Education and Discussions with Family / Patient: Updated  () at bedside.    Total Time Spent on Date of Encounter in care of patient: 45 mins. This time was spent on one or more of the following: performing physical exam; counseling and coordination of care; obtaining or reviewing history;  documenting in the medical record; reviewing/ordering tests, medications or procedures; communicating with other healthcare professionals and discussing with patient's family/caregivers.    Current Length of Stay: 2 day(s)  Current Patient Status: Inpatient   Certification Statement: The patient will continue to require additional inpatient hospital stay due to oxygen titration, COVID-pneumonia, IV steroids and antivirals, medication titration, serial lab monitoring, dispo planning.  Discharge Plan: Anticipate discharge in >72 hrs to discharge location to be determined pending rehab evaluations.    Code Status: Level 1 - Full Code    Subjective:   Patient seen and examined.  Patient feeling more dyspneic and short of breath today.  Oxygen requirements have worsened and she is now on 11 L mid flow.  She appears tired and ill.    Objective:     Vitals:   Temp (24hrs), Av °F (36.7 °C), Min:97.8 °F (36.6 °C), Max:98.3 °F (36.8 °C)    Temp:  [97.8 °F (36.6 °C)-98.3 °F (36.8 °C)] 98.3 °F (36.8 °C)  HR:  [] 97  Resp:  [18-21] 21  BP: (101-118)/(54-70) 101/54  SpO2:  [89 %-97 %] 95 %  Body mass index is 13.55 kg/m².     Input and Output Summary (last 24 hours):     Intake/Output Summary (Last 24 hours) at 2024 1344  Last data filed at 2024 1223  Gross per 24 hour   Intake 145 ml   Output 450 ml   Net -305 ml       PHYSICAL EXAM:    Vitals signs reviewed  Constitutional   Awake and cooperative. NAD.  Frail-appearing.  Malnourished and cachectic.   Head/Neck   Normocephalic. Atraumatic.   HEENT   No scleral icterus. EOMI.   Heart   Regular rate and rhythm. No murmers.   Lungs Labored respirations.  Tachypneic.  Decreased air movement globally.  Expiratory wheezing present.   Abdomen   Soft. Nontender. Nondistended.    Skin   Skin color normal. No rashes.   Extremities   No deformities. No peripheral edema.   Neuro   Alert and oriented. No new deficits.   Psych   Mood stable. Affect normal.         Additional  Data:     Labs:  Results from last 7 days   Lab Units 01/07/24  0546 01/06/24 0437 01/05/24  1820   WBC Thousand/uL 4.70   < > 4.76   HEMOGLOBIN g/dL 12.4   < > 14.8   HEMATOCRIT % 37.5   < > 43.3   PLATELETS Thousands/uL 149   < > 161   NEUTROS PCT %  --   --  70   LYMPHS PCT %  --   --  17   MONOS PCT %  --   --  13*   EOS PCT %  --   --  0    < > = values in this interval not displayed.     Results from last 7 days   Lab Units 01/07/24  0546 01/06/24 0437 01/05/24  1902   SODIUM mmol/L 128*  129*   < > 117*   POTASSIUM mmol/L 3.4*  3.4*   < > 4.8   CHLORIDE mmol/L 88*  89*   < > 77*   CO2 mmol/L 39*  39*   < > 33*   BUN mg/dL 6  6   < > 14   CREATININE mg/dL 0.22*  0.22*   < > 0.36*   ANION GAP mmol/L 1  1   < > 7   CALCIUM mg/dL 7.0*  6.9*   < > 8.1*   ALBUMIN g/dL  --   --  4.0   TOTAL BILIRUBIN mg/dL  --   --  0.39   ALK PHOS U/L  --   --  50   ALT U/L  --   --  25   AST U/L  --   --  53*   GLUCOSE RANDOM mg/dL 143*  137   < > 117    < > = values in this interval not displayed.     Results from last 7 days   Lab Units 01/07/24  0546   INR  2.50*             Results from last 7 days   Lab Units 01/07/24  0546 01/06/24 0437 01/05/24  1820   LACTIC ACID mmol/L  --   --  1.2   PROCALCITONIN ng/ml 0.05 0.13  --        Lines/Drains:  Invasive Devices       Peripheral Intravenous Line  Duration             Peripheral IV 01/05/24 Right Antecubital 1 day                          Imaging: Personally reviewed the following imaging: chest xray    Recent Cultures (last 7 days):   Results from last 7 days   Lab Units 01/05/24  1824 01/05/24  1820   BLOOD CULTURE   --  No Growth at 24 hrs.   GRAM STAIN RESULT  Gram positive cocci in clusters*  --        Last 24 Hours Medication List:   Current Facility-Administered Medications   Medication Dose Route Frequency Provider Last Rate    acetaminophen  650 mg Oral Q6H PRN Mathew Rust MD      albuterol  2 puff Inhalation Q4H PRN Mathew Rust MD       baricitinib  4 mg Oral Q24H Bautista Haider DO      budesonide  0.5 mg Nebulization Q12H Bautista Haider DO      dexamethasone  6 mg Intravenous Q24H Mathew Rust MD      ipratropium  0.5 mg Nebulization TID Bautista Haider DO      levalbuterol  1.25 mg Nebulization TID Bautista Haider DO      magnesium sulfate  4 g Intravenous Once Bautista Haider DO 4 g (01/07/24 1212)    nicotine  1 patch Transdermal Daily Mathew Rust MD      ondansetron  4 mg Intravenous Q6H PRN Mathew Rust MD      remdesivir  100 mg Intravenous Q24H Mathew Rust MD      vancomycin  750 mg Intravenous Q8H Bautista Haider  mg (01/07/24 1323)    warfarin  5 mg Oral Daily (warfarin) Bautista Haider DO          Today, Patient Was Seen By: Bautista Haider DO    **Please Note: This note may have been constructed using a voice recognition system.**

## 2024-01-07 NOTE — ASSESSMENT & PLAN NOTE
1 out of 2 blood cultures on admission growing Micrococcus luteus  This appears consistent with contamination  Will discontinue vancomycin and monitor off antibiotic therapy  Follow-up repeat cultures

## 2024-01-07 NOTE — RESPIRATORY THERAPY NOTE
"RT Protocol Note  Loretta Partida 58 y.o. female MRN: [id number]  Unit/Bed#: -01 Encounter: [id number]    Assessment    Principal Problem:    Acute respiratory failure with hypoxia (HCC)  Active Problems:    COVID-19    COPD (chronic obstructive pulmonary disease) (HCC)    Multiple sclerosis (HCC)    Thoracic outlet syndrome    Hyponatremia    Severe protein-calorie malnutrition (HCC)  Physical Exam:   Assessment Type: During-treatment  General Appearance: Alert, Awake  Respiratory Pattern: Normal  Chest Assessment: Chest expansion symmetrical  Bilateral Breath Sounds: Diminished  O2 Device: MFNC    Vitals:  Blood pressure 101/54, pulse 97, temperature 98.3 °F (36.8 °C), resp. rate 21, height 5' 3\" (1.6 m), weight 34.7 kg (76 lb 8 oz), SpO2 93%.      O2 Device: MFNC     Plan    Respiratory Plan: Mild Distress pathway        Resp Comments: pt having difficulty moving air at this time, pt unable to do MDI, will order TXs TID at this time until respiratory status improves. MFNC increased to 11L .   "

## 2024-01-07 NOTE — ASSESSMENT & PLAN NOTE
Patient presented with progressive shortness of breath and dyspnea and found to be positive for COVID in the emergency room  This is superimposed on baseline COPD, active smoking    Continue IV remdesivir x 5 days  IV Decadron 6 mg daily x 10 days  CRP < 1: will dc baricitinib.  Wean O2 as tolerated  Patient is currently on Coumadin currently subtherapeutic.  Currently patient is on IV heparin drip for bridging to Coumadin.  Procalcitonin negative x 2; continue to monitor off antibiotics  Trend inflammatory markers

## 2024-01-07 NOTE — PROGRESS NOTES
NEPHROLOGY PROGRESS NOTE    Patient: Loretta Partida               Sex: female          DOA: 1/5/2024  6:08 PM   YOB: 1965        Age:  58 y.o.        LOS:  LOS: 2 days       HPI     Patient with COPD admitted with COVID-19 infection and has hyponatremia    SUBJECTIVE     Overall feeling better still appears short of breath     does not appear in any acute distress    CURRENT MEDICATIONS       Current Facility-Administered Medications:     acetaminophen (TYLENOL) tablet 650 mg, 650 mg, Oral, Q6H PRN, Mathew Rust MD    albuterol (PROVENTIL HFA,VENTOLIN HFA) inhaler 2 puff, 2 puff, Inhalation, Q4H PRN, Mathew Rust MD    baricitinib (OLUMIANT) tablet 4 mg, 4 mg, Oral, Q24H, Bautista Haider DO    budesonide (PULMICORT) inhalation solution 0.5 mg, 0.5 mg, Nebulization, Q12H, Bautista Haider DO, 0.5 mg at 01/07/24 1036    cefTRIAXone (ROCEPHIN) IVPB (premix in dextrose) 1,000 mg 50 mL, 1,000 mg, Intravenous, Q24H, Mathew Rust MD, Last Rate: 100 mL/hr at 01/07/24 0417, 1,000 mg at 01/07/24 0417    dexamethasone (PF) (DECADRON) injection 6 mg, 6 mg, Intravenous, Q24H, Mathew Rust MD, 6 mg at 01/07/24 0417    ipratropium (ATROVENT) 0.02 % inhalation solution 0.5 mg, 0.5 mg, Nebulization, TID, Bautista Haider DO, 0.5 mg at 01/07/24 1036    levalbuterol (XOPENEX) inhalation solution 1.25 mg, 1.25 mg, Nebulization, Q8H PRN, Bautista Haider DO, 1.25 mg at 01/07/24 1036    magnesium sulfate 4 g/100 mL IVPB (premix) 4 g, 4 g, Intravenous, Once, Bautista Haider DO    nicotine (NICODERM CQ) 21 mg/24 hr TD 24 hr patch 1 patch, 1 patch, Transdermal, Daily, Mathew Rust MD    ondansetron (ZOFRAN) injection 4 mg, 4 mg, Intravenous, Q6H PRN, Mathew Rust MD    potassium chloride (K-DUR,KLOR-CON) CR tablet 40 mEq, 40 mEq, Oral, Once, Bautista Haider DO    [COMPLETED] remdesivir (Veklury) 200 mg in sodium chloride 0.9 % 290 mL IVPB, 200 mg,  Intravenous, Q24H, 200 mg at 01/06/24 0243 **FOLLOWED BY** remdesivir (Veklury) 100 mg in sodium chloride 0.9 % 270 mL IVPB, 100 mg, Intravenous, Q24H, Mathew Rust MD, 100 mg at 01/07/24 0325    vancomycin (VANCOCIN) IVPB (premix in dextrose) 500 mg 100 mL, 15 mg/kg, Intravenous, Q12H, Bautista Haider DO    warfarin (COUMADIN) tablet 5 mg, 5 mg, Oral, Daily (warfarin), Bautista Haider DO    OBJECTIVE     Current Weight: Weight - Scale: 34.7 kg (76 lb 8 oz)  Vitals:    01/07/24 1043   BP:    Pulse: 97   Resp: 21   Temp:    SpO2: 93%       Intake/Output Summary (Last 24 hours) at 1/7/2024 1132  Last data filed at 1/7/2024 0900  Gross per 24 hour   Intake 120 ml   Output 450 ml   Net -330 ml       PHYSICAL EXAMINATION     Physical Exam  Constitutional:       General: She is not in acute distress.     Appearance: She is well-developed.   HENT:      Head: Normocephalic.      Mouth/Throat:      Mouth: Mucous membranes are moist.   Eyes:      General: No scleral icterus.     Conjunctiva/sclera: Conjunctivae normal.   Neck:      Vascular: No JVD.   Cardiovascular:      Rate and Rhythm: Normal rate.      Heart sounds: Normal heart sounds.   Pulmonary:      Effort: Pulmonary effort is normal.      Breath sounds: No wheezing.   Abdominal:      Palpations: Abdomen is soft.      Tenderness: There is no abdominal tenderness.   Musculoskeletal:         General: Normal range of motion.      Cervical back: Neck supple.   Skin:     General: Skin is warm.      Findings: No rash.   Neurological:      Mental Status: She is alert and oriented to person, place, and time.   Psychiatric:         Behavior: Behavior normal.          LAB RESULTS     Results from last 7 days   Lab Units 01/07/24  0546 01/06/24  1803 01/06/24  1237 01/06/24  0437 01/05/24  1902 01/05/24  1820   WBC Thousand/uL 4.70  --   --  2.71*  --  4.76   HEMOGLOBIN g/dL 12.4  --   --  14.3  --  14.8   HEMATOCRIT % 37.5  --   --  41.4  --  43.3    PLATELETS Thousands/uL 149  --   --  145*  --  161   POTASSIUM mmol/L 3.4*  3.4* 4.0 4.5 4.5 4.8  --    CHLORIDE mmol/L 88*  89* 84* 84* 85* 77*  --    CO2 mmol/L 39*  39* 41* 38* 38* 33*  --    BUN mg/dL 6  6 9 9 7 14  --    CREATININE mg/dL 0.22*  0.22* 0.32* 0.30* 0.26* 0.36*  --    EGFR ml/min/1.73sq m 139  139 123 125 131 118  --    CALCIUM mg/dL 7.0*  6.9* 7.5* 7.8* 7.6* 8.1*  --    MAGNESIUM mg/dL 1.5*  --   --  1.3*  --   --    PHOSPHORUS mg/dL  --   --   --  3.5  --   --        RADIOLOGY RESULTS      Results for orders placed during the hospital encounter of 01/05/24    XR chest 1 view portable    Narrative  CHEST    INDICATION:   Shortness of breath. History of COPD. Denies chest pain and fevers. Patient also complains of nausea and vomiting.    COMPARISON:  None    EXAM PERFORMED/VIEWS:  XR CHEST PORTABLE AP semierect  Images:  1    FINDINGS:  Monitoring leads and clips project over the chest. External radiopaque artifact overlies the left apex.  A surgical clip projects over the left mid-to-upper chest wall.    Cardiomediastinal silhouette appears unremarkable.    Tracheal calcification is noted.  The lungs are hyperinflated.  No pneumothorax or pleural effusion.    Osseous structures appear within normal limits for patient age.    Surgical clips project over the right upper quadrant.    Impression  No acute cardiopulmonary disease.            Resident: BRENDAN QUINONES I, the attending radiologist, have reviewed the images and agree with the final report above.    Workstation performed: [id number]    No results found for this or any previous visit.    No results found for this or any previous visit.    No results found for this or any previous visit.    No results found for this or any previous visit.    No results found for this or any previous visit.      PLAN / RECOMMENDATIONS      Hyponatremia: Improving.  Based on urine study.  Low urine osmolality and low urine sodium suggest excessive  "water intake.  Will advise fluid restriction which I ordered and monitor sodium closely    COPD: Stable for now    COVID-19 infection still hypoxic requiring oxygen and being treated aggressively    Will monitor with you    Jus Eden MD  Nephrology  1/7/2024        Portions of the record may have been created with voice recognition software. Occasional wrong word or \"sound a like\" substitutions may have occurred due to the inherent limitations of voice recognition software. Read the chart carefully and recognize, using context, where substitutions have occurred.  "

## 2024-01-07 NOTE — PLAN OF CARE
Problem: PAIN - ADULT  Goal: Verbalizes/displays adequate comfort level or baseline comfort level  Description: Interventions:  - Encourage patient to monitor pain and request assistance  - Assess pain using appropriate pain scale  - Administer analgesics based on type and severity of pain and evaluate response  - Implement non-pharmacological measures as appropriate and evaluate response  - Consider cultural and social influences on pain and pain management  - Notify physician/advanced practitioner if interventions unsuccessful or patient reports new pain  Outcome: Progressing     Problem: INFECTION - ADULT  Goal: Absence or prevention of progression during hospitalization  Description: INTERVENTIONS:  - Assess and monitor for signs and symptoms of infection  - Monitor lab/diagnostic results  - Monitor all insertion sites, i.e. indwelling lines, tubes, and drains  - Monitor endotracheal if appropriate and nasal secretions for changes in amount and color  - [place] appropriate cooling/warming therapies per order  - Administer medications as ordered  - Instruct and encourage patient and family to use good hand hygiene technique  - Identify and instruct in appropriate isolation precautions for identified infection/condition  Outcome: Progressing  Goal: Absence of fever/infection during neutropenic period  Description: INTERVENTIONS:  - Monitor WBC    Outcome: Progressing     Problem: SAFETY ADULT  Goal: Patient will remain free of falls  Description: INTERVENTIONS:  - Educate patient/family on patient safety including physical limitations  - Instruct patient to call for assistance with activity   - Consult OT/PT to assist with strengthening/mobility   - Keep Call bell within reach  - Keep bed low and locked with side rails adjusted as appropriate  - Keep care items and personal belongings within reach  - Initiate and maintain comfort rounds  - Apply yellow socks and bracelet for high fall risk patients  - Consider  moving patient to room near nurses station  Outcome: Progressing  Goal: Maintain or return to baseline ADL function  Description: INTERVENTIONS:  -  Assess patient's ability to carry out ADLs; assess patient's baseline for ADL function and identify physical deficits which impact ability to perform ADLs (bathing, care of mouth/teeth, toileting, grooming, dressing, etc.)  - Assess/evaluate cause of self-care deficits   - Assess range of motion  - Assess patient's mobility; develop plan if impaired  - Assess patient's need for assistive devices and provide as appropriate  - Encourage maximum independence but intervene and supervise when necessary  - Involve family in performance of ADLs  - Assess for home care needs following discharge   - Consider OT consult to assist with ADL evaluation and planning for discharge  - Provide patient education as appropriate  Outcome: Progressing  Goal: Maintains/Returns to pre admission functional level  Description: INTERVENTIONS:  - Perform AM-PAC 6 Click Basic Mobility/ Daily Activity assessment daily.  - Set and communicate daily mobility goal to care team and patient/family/caregiver.   - Collaborate with rehabilitation services on mobility goals if consulted  - Out of bed for toileting  - Record patient progress and toleration of activity level   Outcome: Progressing     Problem: DISCHARGE PLANNING  Goal: Discharge to home or other facility with appropriate resources  Description: INTERVENTIONS:  - Identify barriers to discharge w/patient and caregiver  - Arrange for needed discharge resources and transportation as appropriate  - Identify discharge learning needs (meds, wound care, etc.)  - Arrange for interpretive services to assist at discharge as needed  - Refer to Case Management Department for coordinating discharge planning if the patient needs post-hospital services based on physician/advanced practitioner order or complex needs related to functional status, cognitive  ability, or social support system  Outcome: Progressing     Problem: Knowledge Deficit  Goal: Patient/family/caregiver demonstrates understanding of disease process, treatment plan, medications, and discharge instructions  Description: Complete learning assessment and assess knowledge base.  Interventions:  - Provide teaching at level of understanding  - Provide teaching via preferred learning methods  Outcome: Progressing     Problem: RESPIRATORY - ADULT  Goal: Achieves optimal ventilation and oxygenation  Description: INTERVENTIONS:  - Assess for changes in respiratory status  - Assess for changes in mentation and behavior  - Position to facilitate oxygenation and minimize respiratory effort  - Oxygen administered by appropriate delivery if ordered  - Initiate smoking cessation education as indicated  - Encourage broncho-pulmonary hygiene including cough, deep breathe, Incentive Spirometry  - Assess the need for suctioning and aspirate as needed  - Assess and instruct to report SOB or any respiratory difficulty  - Respiratory Therapy support as indicated  Outcome: Progressing     Problem: Prexisting or High Potential for Compromised Skin Integrity  Goal: Skin integrity is maintained or improved  Description: INTERVENTIONS:  - Identify patients at risk for skin breakdown  - Assess and monitor skin integrity  - Assess and monitor nutrition and hydration status  - Monitor labs   - Assess for incontinence   - Turn and reposition patient  - Assist with mobility/ambulation  - Relieve pressure over bony prominences  - Avoid friction and shearing  - Provide appropriate hygiene as needed including keeping skin clean and dry  - Evaluate need for skin moisturizer/barrier cream  - Collaborate with interdisciplinary team   - Patient/family teaching  - Consider wound care consult   Outcome: Progressing

## 2024-01-07 NOTE — ASSESSMENT & PLAN NOTE
Continue home inhaler regimen  Very poor air movement, suspect severe underlying disease  Will discontinue inhalers and change to nebulized regimen  Started on nebulized Atrovent, Xopenex, budesonide  IV Decadron per COVID protocol

## 2024-01-07 NOTE — PLAN OF CARE
Problem: RESPIRATORY - ADULT  Goal: Achieves optimal ventilation and oxygenation  Description: INTERVENTIONS:  - Assess for changes in respiratory status  - Assess for changes in mentation and behavior  - Position to facilitate oxygenation and minimize respiratory effort  - Oxygen administered by appropriate delivery if ordered  - Initiate smoking cessation education as indicated  - Encourage broncho-pulmonary hygiene including cough, deep breathe, Incentive Spirometry  - Assess the need for suctioning and aspirate as needed  - Assess and instruct to report SOB or any respiratory difficulty  - Respiratory Therapy support as indicated  Outcome: Progressing     Problem: DISCHARGE PLANNING  Goal: Discharge to home or other facility with appropriate resources  Description: INTERVENTIONS:  - Identify barriers to discharge w/patient and caregiver  - Arrange for needed discharge resources and transportation as appropriate  - Identify discharge learning needs (meds, wound care, etc.)  - Arrange for interpretive services to assist at discharge as needed  - Refer to Case Management Department for coordinating discharge planning if the patient needs post-hospital services based on physician/advanced practitioner order or complex needs related to functional status, cognitive ability, or social support system  Outcome: Progressing

## 2024-01-07 NOTE — QUICK NOTE
Notified pt had 1 out of 2 BCs (+) for gram (+) cocci in clusters. Continue to trend BCs as most likely contaminant with 1 of 2 (+).     However, for coverage for now pending further BCs results will give one time dose IV vancomycin.

## 2024-01-07 NOTE — UTILIZATION REVIEW
NOTIFICATION OF INPATIENT ADMISSION   AUTHORIZATION REQUEST   SERVICING FACILITY:   [place] [address]  Tax ID: [id number]  NPI: [id number] ATTENDING PROVIDER:  Attending Name and NPI#: Bautista Haider Do [[id number]]  Address: [address] [place] [address]  Phone: [phone]     ADMISSION INFORMATION:  Place of Service: Inpatient [facility]  Place of Service Code: 21  Inpatient Admission Date/Time: 1/5/24  9:07 PM  Discharge Date/Time: No discharge date for patient encounter.  Admitting Diagnosis Code/Description:  Hyponatremia [E87.1]  SOB (shortness of breath) [R06.02]  COVID-19 [U07.1]     UTILIZATION REVIEW CONTACT:  Shruthi Foreman, Utilization   Network Utilization Review Department  Phone: [phone]  Fax [phone]  Email: [email or web address]  Contact for approvals/pending authorizations, clinical reviews, and discharge.     PHYSICIAN ADVISORY SERVICES:  Medical Necessity Denial & Jxgs-rf-Ivit Review  Phone: [phone]  Fax: [phone]  Email: [email or web address]     DISCHARGE SUPPORT TEAM:  For Patients Discharge Needs & Updates  Phone: [phone] opt. 2 Fax: [phone]  Email: [email or web address]

## 2024-01-08 LAB
ANION GAP SERPL CALCULATED.3IONS-SCNC: 1 MMOL/L
APTT PPP: 34 SECONDS (ref 23–37)
APTT PPP: 41 SECONDS (ref 23–37)
BUN SERPL-MCNC: 7 MG/DL (ref 5–25)
CALCIUM SERPL-MCNC: 7.3 MG/DL (ref 8.4–10.2)
CHLORIDE SERPL-SCNC: 88 MMOL/L (ref 96–108)
CO2 SERPL-SCNC: 42 MMOL/L (ref 21–32)
CREAT SERPL-MCNC: 0.24 MG/DL (ref 0.6–1.3)
ERYTHROCYTE [DISTWIDTH] IN BLOOD BY AUTOMATED COUNT: 12 % (ref 11.6–15.1)
ERYTHROCYTE [DISTWIDTH] IN BLOOD BY AUTOMATED COUNT: 12.1 % (ref 11.6–15.1)
GFR SERPL CREATININE-BSD FRML MDRD: 135 ML/MIN/1.73SQ M
GLUCOSE SERPL-MCNC: 115 MG/DL (ref 65–140)
HCT VFR BLD AUTO: 37.8 % (ref 34.8–46.1)
HCT VFR BLD AUTO: 38.5 % (ref 34.8–46.1)
HGB BLD-MCNC: 12.3 G/DL (ref 11.5–15.4)
HGB BLD-MCNC: 12.6 G/DL (ref 11.5–15.4)
INR PPP: 1.48 (ref 0.84–1.19)
INR PPP: 1.61 (ref 0.84–1.19)
MAGNESIUM SERPL-MCNC: 1.6 MG/DL (ref 1.9–2.7)
MCH RBC QN AUTO: 33 PG (ref 26.8–34.3)
MCH RBC QN AUTO: 33 PG (ref 26.8–34.3)
MCHC RBC AUTO-ENTMCNC: 32.5 G/DL (ref 31.4–37.4)
MCHC RBC AUTO-ENTMCNC: 32.7 G/DL (ref 31.4–37.4)
MCV RBC AUTO: 101 FL (ref 82–98)
MCV RBC AUTO: 101 FL (ref 82–98)
PLATELET # BLD AUTO: 171 THOUSANDS/UL (ref 149–390)
PLATELET # BLD AUTO: 173 THOUSANDS/UL (ref 149–390)
PMV BLD AUTO: 9.4 FL (ref 8.9–12.7)
PMV BLD AUTO: 9.5 FL (ref 8.9–12.7)
POTASSIUM SERPL-SCNC: 4.1 MMOL/L (ref 3.5–5.3)
PROTHROMBIN TIME: 18.7 SECONDS (ref 11.6–14.5)
PROTHROMBIN TIME: 19.9 SECONDS (ref 11.6–14.5)
RBC # BLD AUTO: 3.73 MILLION/UL (ref 3.81–5.12)
RBC # BLD AUTO: 3.82 MILLION/UL (ref 3.81–5.12)
SODIUM SERPL-SCNC: 131 MMOL/L (ref 135–147)
VANCOMYCIN TROUGH SERPL-MCNC: 7.7 UG/ML (ref 10–20)
WBC # BLD AUTO: 2.94 THOUSAND/UL (ref 4.31–10.16)
WBC # BLD AUTO: 4.58 THOUSAND/UL (ref 4.31–10.16)

## 2024-01-08 PROCEDURE — 99233 SBSQ HOSP IP/OBS HIGH 50: CPT | Performed by: INTERNAL MEDICINE

## 2024-01-08 PROCEDURE — 85730 THROMBOPLASTIN TIME PARTIAL: CPT | Performed by: INTERNAL MEDICINE

## 2024-01-08 PROCEDURE — 85610 PROTHROMBIN TIME: CPT | Performed by: FAMILY MEDICINE

## 2024-01-08 PROCEDURE — 94664 DEMO&/EVAL PT USE INHALER: CPT

## 2024-01-08 PROCEDURE — 85027 COMPLETE CBC AUTOMATED: CPT | Performed by: INTERNAL MEDICINE

## 2024-01-08 PROCEDURE — 80048 BASIC METABOLIC PNL TOTAL CA: CPT | Performed by: INTERNAL MEDICINE

## 2024-01-08 PROCEDURE — 99232 SBSQ HOSP IP/OBS MODERATE 35: CPT | Performed by: INTERNAL MEDICINE

## 2024-01-08 PROCEDURE — 94640 AIRWAY INHALATION TREATMENT: CPT

## 2024-01-08 PROCEDURE — 94760 N-INVAS EAR/PLS OXIMETRY 1: CPT

## 2024-01-08 PROCEDURE — 80202 ASSAY OF VANCOMYCIN: CPT | Performed by: INTERNAL MEDICINE

## 2024-01-08 PROCEDURE — 85610 PROTHROMBIN TIME: CPT | Performed by: INTERNAL MEDICINE

## 2024-01-08 PROCEDURE — 83735 ASSAY OF MAGNESIUM: CPT | Performed by: INTERNAL MEDICINE

## 2024-01-08 RX ORDER — MAGNESIUM SULFATE HEPTAHYDRATE 40 MG/ML
4 INJECTION, SOLUTION INTRAVENOUS ONCE
Status: COMPLETED | OUTPATIENT
Start: 2024-01-08 | End: 2024-01-08

## 2024-01-08 RX ORDER — VANCOMYCIN HYDROCHLORIDE 1 G/200ML
1000 INJECTION, SOLUTION INTRAVENOUS EVERY 12 HOURS
Status: DISCONTINUED | OUTPATIENT
Start: 2024-01-08 | End: 2024-01-08

## 2024-01-08 RX ORDER — HEPARIN SODIUM 10000 [USP'U]/100ML
3-20 INJECTION, SOLUTION INTRAVENOUS
Status: DISCONTINUED | OUTPATIENT
Start: 2024-01-08 | End: 2024-01-11

## 2024-01-08 RX ORDER — HEPARIN SODIUM 1000 [USP'U]/ML
1200 INJECTION, SOLUTION INTRAVENOUS; SUBCUTANEOUS EVERY 6 HOURS PRN
Status: DISCONTINUED | OUTPATIENT
Start: 2024-01-08 | End: 2024-01-11

## 2024-01-08 RX ORDER — HEPARIN SODIUM 1000 [USP'U]/ML
2400 INJECTION, SOLUTION INTRAVENOUS; SUBCUTANEOUS EVERY 6 HOURS PRN
Status: DISCONTINUED | OUTPATIENT
Start: 2024-01-08 | End: 2024-01-11

## 2024-01-08 RX ADMIN — IPRATROPIUM BROMIDE 0.5 MG: 0.5 SOLUTION RESPIRATORY (INHALATION) at 19:36

## 2024-01-08 RX ADMIN — LEVALBUTEROL HYDROCHLORIDE 1.25 MG: 1.25 SOLUTION RESPIRATORY (INHALATION) at 14:03

## 2024-01-08 RX ADMIN — BARICITINIB 4 MG: 2 TABLET, FILM COATED ORAL at 10:09

## 2024-01-08 RX ADMIN — BUDESONIDE INHALATION 0.5 MG: 0.5 SUSPENSION RESPIRATORY (INHALATION) at 07:55

## 2024-01-08 RX ADMIN — REMDESIVIR 100 MG: 100 INJECTION, POWDER, LYOPHILIZED, FOR SOLUTION INTRAVENOUS at 03:24

## 2024-01-08 RX ADMIN — HEPARIN SODIUM 12 UNITS/KG/HR: 10000 INJECTION, SOLUTION INTRAVENOUS at 09:53

## 2024-01-08 RX ADMIN — VANCOMYCIN HYDROCHLORIDE 1000 MG: 1 INJECTION, SOLUTION INTRAVENOUS at 10:19

## 2024-01-08 RX ADMIN — MAGNESIUM SULFATE HEPTAHYDRATE 4 G: 40 INJECTION, SOLUTION INTRAVENOUS at 09:52

## 2024-01-08 RX ADMIN — BUDESONIDE INHALATION 0.5 MG: 0.5 SUSPENSION RESPIRATORY (INHALATION) at 19:37

## 2024-01-08 RX ADMIN — HEPARIN SODIUM 2400 UNITS: 1000 INJECTION INTRAVENOUS; SUBCUTANEOUS at 18:23

## 2024-01-08 RX ADMIN — WARFARIN SODIUM 5 MG: 5 TABLET ORAL at 18:19

## 2024-01-08 RX ADMIN — IPRATROPIUM BROMIDE 0.5 MG: 0.5 SOLUTION RESPIRATORY (INHALATION) at 07:55

## 2024-01-08 RX ADMIN — LEVALBUTEROL HYDROCHLORIDE 1.25 MG: 1.25 SOLUTION RESPIRATORY (INHALATION) at 07:55

## 2024-01-08 RX ADMIN — LEVALBUTEROL HYDROCHLORIDE 1.25 MG: 1.25 SOLUTION RESPIRATORY (INHALATION) at 19:36

## 2024-01-08 RX ADMIN — IPRATROPIUM BROMIDE 0.5 MG: 0.5 SOLUTION RESPIRATORY (INHALATION) at 14:03

## 2024-01-08 RX ADMIN — DEXAMETHASONE SODIUM PHOSPHATE 6 MG: 10 INJECTION, SOLUTION INTRAMUSCULAR; INTRAVENOUS at 03:23

## 2024-01-08 NOTE — PROGRESS NOTES
NEPHROLOGY PROGRESS NOTE    Patient: Loretta Partida               Sex: female          DOA: 1/5/2024  6:08 PM   YOB: 1965        Age:  58 y.o.        LOS:  LOS: 3 days       HPI     Patient with hyponatremia and COVID-19 infection    SUBJECTIVE     Seems to be stable    Not in acute distress    No chest pain no palpitation    CURRENT MEDICATIONS       Current Facility-Administered Medications:     acetaminophen (TYLENOL) tablet 650 mg, 650 mg, Oral, Q6H PRN, Mathew Rust MD    albuterol (PROVENTIL HFA,VENTOLIN HFA) inhaler 2 puff, 2 puff, Inhalation, Q4H PRN, Mathew Rust MD    baricitinib (OLUMIANT) tablet 4 mg, 4 mg, Oral, Q24H, Bautista Haider DO, 4 mg at 01/08/24 1009    budesonide (PULMICORT) inhalation solution 0.5 mg, 0.5 mg, Nebulization, Q12H, Bautista Haider DO, 0.5 mg at 01/08/24 0755    dexamethasone (PF) (DECADRON) injection 6 mg, 6 mg, Intravenous, Q24H, Mathew Rust MD, 6 mg at 01/08/24 0323    heparin (porcine) 25,000 units in 0.45% NaCl 250 mL infusion (premix), 3-20 Units/kg/hr (Order-Specific), Intravenous, Titrated, Bautista Haider DO, Last Rate: 3.6 mL/hr at 01/08/24 0953, 12 Units/kg/hr at 01/08/24 0953    heparin (porcine) injection 1,200 Units, 1,200 Units, Intravenous, Q6H PRN, Bautista Haider DO    heparin (porcine) injection 2,400 Units, 2,400 Units, Intravenous, Q6H PRN, Bautista Haider DO    ipratropium (ATROVENT) 0.02 % inhalation solution 0.5 mg, 0.5 mg, Nebulization, TID, Bautista Haider DO, 0.5 mg at 01/08/24 1403    levalbuterol (XOPENEX) inhalation solution 1.25 mg, 1.25 mg, Nebulization, TID, Bautista Haider DO, 1.25 mg at 01/08/24 1403    nicotine (NICODERM CQ) 21 mg/24 hr TD 24 hr patch 1 patch, 1 patch, Transdermal, Daily, Mathew Rust MD    ondansetron (ZOFRAN) injection 4 mg, 4 mg, Intravenous, Q6H PRN, Mathew Rust MD    [COMPLETED] remdesivir (Veklury) 200 mg in sodium chloride  0.9 % 290 mL IVPB, 200 mg, Intravenous, Q24H, 200 mg at 01/06/24 0243 **FOLLOWED BY** remdesivir (Veklury) 100 mg in sodium chloride 0.9 % 270 mL IVPB, 100 mg, Intravenous, Q24H, Mathew Rust MD, 100 mg at 01/08/24 0324    vancomycin (VANCOCIN) IVPB (premix in dextrose) 1,000 mg 200 mL, 1,000 mg, Intravenous, Q12H, Bautista Haider DO, Last Rate: 200 mL/hr at 01/08/24 1019, 1,000 mg at 01/08/24 1019    warfarin (COUMADIN) tablet 5 mg, 5 mg, Oral, Daily (warfarin), Bautista Haider DO, 5 mg at 01/07/24 1758    OBJECTIVE     Current Weight: Weight - Scale: 34.7 kg (76 lb 8 oz)  Vitals:    01/08/24 1419   BP:    Pulse: 88   Resp: 19   Temp:    SpO2: 92%       Intake/Output Summary (Last 24 hours) at 1/8/2024 1455  Last data filed at 1/8/2024 1155  Gross per 24 hour   Intake 1050 ml   Output 1000 ml   Net 50 ml       PHYSICAL EXAMINATION     Physical Exam  Constitutional:       General: She is not in acute distress.     Appearance: She is well-developed.   HENT:      Head: Normocephalic.      Mouth/Throat:      Mouth: Mucous membranes are moist.   Eyes:      General: No scleral icterus.     Conjunctiva/sclera: Conjunctivae normal.   Neck:      Vascular: No JVD.   Cardiovascular:      Rate and Rhythm: Normal rate.      Heart sounds: Normal heart sounds.   Pulmonary:      Effort: Pulmonary effort is normal.      Breath sounds: No wheezing.   Abdominal:      Palpations: Abdomen is soft.      Tenderness: There is no abdominal tenderness.   Musculoskeletal:         General: Normal range of motion.      Cervical back: Neck supple.   Skin:     General: Skin is warm.      Findings: No rash.   Neurological:      Mental Status: She is alert and oriented to person, place, and time.   Psychiatric:         Behavior: Behavior normal.          LAB RESULTS     Results from last 7 days   Lab Units 01/08/24  1020 01/08/24  0535 01/07/24  2026 01/07/24  0546 01/06/24  1803 01/06/24  1237 01/06/24  0437 01/05/24  1902  01/05/24  1820   WBC Thousand/uL 2.94* 4.58  --  4.70  --   --  2.71*  --  4.76   HEMOGLOBIN g/dL 12.6 12.3  --  12.4  --   --  14.3  --  14.8   HEMATOCRIT % 38.5 37.8  --  37.5  --   --  41.4  --  43.3   PLATELETS Thousands/uL 173 171  --  149  --   --  145*  --  161   POTASSIUM mmol/L  --  4.1 3.9 3.4*  3.4* 4.0 4.5 4.5 4.8  --    CHLORIDE mmol/L  --  88* 86* 88*  89* 84* 84* 85* 77*  --    CO2 mmol/L  --  42* 44* 39*  39* 41* 38* 38* 33*  --    BUN mg/dL  --  7 11 6  6 9 9 7 14  --    CREATININE mg/dL  --  0.24* 0.40* 0.22*  0.22* 0.32* 0.30* 0.26* 0.36*  --    EGFR ml/min/1.73sq m  --  135 114 139  139 123 125 131 118  --    CALCIUM mg/dL  --  7.3* 7.5* 7.0*  6.9* 7.5* 7.8* 7.6* 8.1*  --    MAGNESIUM mg/dL  --  1.6*  --  1.5*  --   --  1.3*  --   --    PHOSPHORUS mg/dL  --   --   --   --   --   --  3.5  --   --        RADIOLOGY RESULTS      Results for orders placed during the hospital encounter of 01/05/24    XR chest 1 view portable    Narrative  CHEST    INDICATION:   Shortness of breath. History of COPD. Denies chest pain and fevers. Patient also complains of nausea and vomiting.    COMPARISON:  None    EXAM PERFORMED/VIEWS:  XR CHEST PORTABLE AP semierect  Images:  1    FINDINGS:  Monitoring leads and clips project over the chest. External radiopaque artifact overlies the left apex.  A surgical clip projects over the left mid-to-upper chest wall.    Cardiomediastinal silhouette appears unremarkable.    Tracheal calcification is noted.  The lungs are hyperinflated.  No pneumothorax or pleural effusion.    Osseous structures appear within normal limits for patient age.    Surgical clips project over the right upper quadrant.    Impression  No acute cardiopulmonary disease.            Resident: BRENDAN QUINONES I, the attending radiologist, have reviewed the images and agree with the final report above.    Workstation performed: [id number]    No results found for this or any previous visit.    No  "results found for this or any previous visit.    No results found for this or any previous visit.    No results found for this or any previous visit.    No results found for this or any previous visit.      PLAN / RECOMMENDATIONS      Hyponatremia: Seems to be stable for now with sodium 131.  Will continue the fluid restriction for now.    Does appear chronic in nature with possibly due to SIADH is a base problem and does get worse with increased water intake which happened this time    COVID-19 infection: Still hypoxic and on medication    Will sign off for now call back if needed    Jus Eden MD  Nephrology  1/8/2024        Portions of the record may have been created with voice recognition software. Occasional wrong word or \"sound a like\" substitutions may have occurred due to the inherent limitations of voice recognition software. Read the chart carefully and recognize, using context, where substitutions have occurred.  "

## 2024-01-08 NOTE — PLAN OF CARE
Problem: PAIN - ADULT  Goal: Verbalizes/displays adequate comfort level or baseline comfort level  Description: Interventions:  - Encourage patient to monitor pain and request assistance  - Assess pain using appropriate pain scale  - Administer analgesics based on type and severity of pain and evaluate response  - Implement non-pharmacological measures as appropriate and evaluate response  - Consider cultural and social influences on pain and pain management  - Notify physician/advanced practitioner if interventions unsuccessful or patient reports new pain  Outcome: Progressing     Problem: INFECTION - ADULT  Goal: Absence or prevention of progression during hospitalization  Description: INTERVENTIONS:  - Assess and monitor for signs and symptoms of infection  - Monitor lab/diagnostic results  - Monitor all insertion sites, i.e. indwelling lines, tubes, and drains  - Monitor endotracheal if appropriate and nasal secretions for changes in amount and color  - [place] appropriate cooling/warming therapies per order  - Administer medications as ordered  - Instruct and encourage patient and family to use good hand hygiene technique  - Identify and instruct in appropriate isolation precautions for identified infection/condition  Outcome: Progressing     Problem: SAFETY ADULT  Goal: Patient will remain free of falls  Description: INTERVENTIONS:  - Educate patient/family on patient safety including physical limitations  - Instruct patient to call for assistance with activity   - Consult OT/PT to assist with strengthening/mobility   - Keep Call bell within reach  - Keep bed low and locked with side rails adjusted as appropriate  - Keep care items and personal belongings within reach  - Initiate and maintain comfort rounds  - Make Fall Risk Sign visible to staff  - Offer Toileting every 2 Hours, in advance of need  - Initiate/Maintain bed alarm  - Apply yellow socks and bracelet for high fall risk patients  - Consider  moving patient to room near nurses station  Outcome: Progressing

## 2024-01-08 NOTE — PROGRESS NOTES
[facility]  Progress Note  Name: Loretta Partida I  MRN: [id number]  Unit/Bed#: -01 I Date of Admission: 1/5/2024   Date of Service: 1/8/2024 I Hospital Day: 3    Assessment/Plan   * Acute respiratory failure with hypoxia (HCC)  Assessment & Plan  Acute respiratory failure with hypoxia secondary to COVID-19 infection superimposed on baseline COPD  Quired as much as 11 L mid flow; currently improved to 7 L  Continue to monitor and titrate as needed to maintain oxygen saturation greater than 88%  See treatment plan below for COVID    Hyponatremia  Assessment & Plan  Severe hyponatremia on admission at 117; probably secondary to SIADH  Now improving with fluid restriction  Continue to monitor daily BMP    COVID-19  Assessment & Plan  Patient presented with progressive shortness of breath and dyspnea and found to be positive for COVID in the emergency room  This is superimposed on baseline COPD, active smoking    Continue IV remdesivir x 5 days  IV Decadron 6 mg daily x 10 days  Will add baricitinib x 14 days  Wean O2 as tolerated  Continue Coumadin for now; if she clinically worsens would hold Coumadin transition heparin drip  Procalcitonin negative x 2; continue to monitor off antibiotics  Trend inflammatory markers    Hypomagnesemia  Assessment & Plan  Remains low; will replace with IV magnesium 4 g again today.  Recheck in the morning    Positive blood culture  Assessment & Plan  1 out of 2 blood cultures on admission growing Micrococcus luteus  This appears consistent with contamination  Will discontinue vancomycin and monitor off antibiotic therapy  Follow-up repeat cultures    Severe protein-calorie malnutrition (HCC)  Assessment & Plan  Malnutrition Findings:   Adult Malnutrition type: Chronic illness  Adult Degree of Malnutrition: Other severe protein calorie malnutrition  Malnutrition Characteristics: Inadequate energy, Weight loss                  360 Statement: Other severe  malnutrition related to increased energy-protein needs in the context of chronic illness (COPD, MS) as evidenced by consuming < 75% of energy intake compared to estimated needs for > 1 month and reported 24% weight loss in 1 year. Treated with diet and ONS.    BMI Findings:  Adult BMI Classifications: Underweight < 18.5        Body mass index is 13.55 kg/m².       Thoracic outlet syndrome  Assessment & Plan  INR subtherapeutic on 1/8; will initiate heparin drip  Monitor PTT per protocol  Continue Coumadin 5 mg daily with daily INR checks    Multiple sclerosis (HCC)  Assessment & Plan  The patient is on some pain medications and Valium as needed    COPD (chronic obstructive pulmonary disease) ([facility])  Assessment & Plan  Continue home inhaler regimen  Very poor air movement, suspect severe underlying disease  Will discontinue inhalers and change to nebulized regimen  Started on nebulized Atrovent, Xopenex, budesonide  IV Decadron per COVID protocol        VTE Pharmacologic Prophylaxis: VTE Score: 5 High Risk (Score >/= 5) - Pharmacological DVT Prophylaxis Ordered: warfarin (Coumadin). Sequential Compression Devices Ordered.    Mobility:   Basic Mobility Inpatient Raw Score: 20  JH-HLM Goal: 6: Walk 10 steps or more  JH-HLM Achieved: 4: Move to chair/commode  HLM Goal NOT achieved. Continue with multidisciplinary rounding and encourage appropriate mobility to improve upon HLM goals.    Patient Centered Rounds: I performed bedside rounds with nursing staff today.   Discussions with Specialists or Other Care Team Provider: BENITO.    Education and Discussions with Family / Patient: Updated  () via phone.    Total Time Spent on Date of Encounter in care of patient: 40 mins. This time was spent on one or more of the following: performing physical exam; counseling and coordination of care; obtaining or reviewing history; documenting in the medical record; reviewing/ordering tests, medications or procedures;  communicating with other healthcare professionals and discussing with patient's family/caregivers.    Current Length of Stay: 3 day(s)  Current Patient Status: Inpatient   Certification Statement: The patient will continue to require additional inpatient hospital stay due to oxygen titration, COVID-pneumonia, IV steroids and antivirals, medication titration, serial lab monitoring, dispo planning.  Discharge Plan: Anticipate discharge in 48-72 hrs to discharge location to be determined pending rehab evaluations.    Code Status: Level 1 - Full Code    Subjective:   Patient seen and examined.  Patient feeling a bit better today.  Really wants to go home though still requiring 7 L.  Air movement and wheezing improved on exam.    Objective:     Vitals:   Temp (24hrs), Av.3 °F (36.8 °C), Min:98.1 °F (36.7 °C), Max:98.8 °F (37.1 °C)    Temp:  [98.1 °F (36.7 °C)-98.8 °F (37.1 °C)] 98.1 °F (36.7 °C)  HR:  [] 93  Resp:  [19-20] 19  BP: (112)/(65-66) 112/66  SpO2:  [92 %-99 %] 98 %  Body mass index is 13.55 kg/m².     Input and Output Summary (last 24 hours):     Intake/Output Summary (Last 24 hours) at 2024 1700  Last data filed at 2024 1155  Gross per 24 hour   Intake 1050 ml   Output 1000 ml   Net 50 ml       PHYSICAL EXAM:    Vitals signs reviewed  Constitutional   Awake and cooperative. NAD.  Frail-appearing.  Malnourished and cachectic.   Head/Neck   Normocephalic. Atraumatic.   HEENT   No scleral icterus. EOMI.   Heart   Regular rate and rhythm. No murmers.   Lungs Respirations less labored today.  Better air movement globally.  Wheezing improved.   Abdomen   Soft. Nontender. Nondistended.    Skin   Skin color normal. No rashes.   Extremities   No deformities. No peripheral edema.   Neuro   Alert and oriented. No new deficits.   Psych   Mood stable. Affect normal.         Additional Data:     Labs:  Results from last 7 days   Lab Units 24  1020 24  0437 24  1820   WBC Thousand/uL  2.94*   < > 4.76   HEMOGLOBIN g/dL 12.6   < > 14.8   HEMATOCRIT % 38.5   < > 43.3   PLATELETS Thousands/uL 173   < > 161   NEUTROS PCT %  --   --  70   LYMPHS PCT %  --   --  17   MONOS PCT %  --   --  13*   EOS PCT %  --   --  0    < > = values in this interval not displayed.     Results from last 7 days   Lab Units 01/08/24  0535 01/06/24  0437 01/05/24  1902   SODIUM mmol/L 131*   < > 117*   POTASSIUM mmol/L 4.1   < > 4.8   CHLORIDE mmol/L 88*   < > 77*   CO2 mmol/L 42*   < > 33*   BUN mg/dL 7   < > 14   CREATININE mg/dL 0.24*   < > 0.36*   ANION GAP mmol/L 1   < > 7   CALCIUM mg/dL 7.3*   < > 8.1*   ALBUMIN g/dL  --   --  4.0   TOTAL BILIRUBIN mg/dL  --   --  0.39   ALK PHOS U/L  --   --  50   ALT U/L  --   --  25   AST U/L  --   --  53*   GLUCOSE RANDOM mg/dL 115   < > 117    < > = values in this interval not displayed.     Results from last 7 days   Lab Units 01/08/24  1020   INR  1.48*             Results from last 7 days   Lab Units 01/07/24  0546 01/06/24  0437 01/05/24  1820   LACTIC ACID mmol/L  --   --  1.2   PROCALCITONIN ng/ml 0.05 0.13  --        Lines/Drains:  Invasive Devices       Peripheral Intravenous Line  Duration             Peripheral IV 01/05/24 Right Antecubital 2 days    Peripheral IV 01/08/24 Left;Ventral (anterior) Forearm <1 day                          Imaging: Personally reviewed the following imaging: chest xray    Recent Cultures (last 7 days):   Results from last 7 days   Lab Units 01/07/24  1415 01/07/24  1411 01/05/24  1824 01/05/24  1820   BLOOD CULTURE  Received in Microbiology Lab. Culture in Progress. Received in Microbiology Lab. Culture in Progress. Micrococcus luteus* No Growth at 48 hrs.   GRAM STAIN RESULT   --   --  Gram positive cocci in clusters*  --        Last 24 Hours Medication List:   Current Facility-Administered Medications   Medication Dose Route Frequency Provider Last Rate    acetaminophen  650 mg Oral Q6H PRN Mathew Rust MD      albuterol  2 puff  Inhalation Q4H PRN Mathew Rust MD      baricitinib  4 mg Oral Q24H Bautista Haider DO      budesonide  0.5 mg Nebulization Q12H Bautista Haider DO      dexamethasone  6 mg Intravenous Q24H Mathew Rust MD      heparin (porcine)  3-20 Units/kg/hr (Order-Specific) Intravenous Titrated Bautista Haider DO 12 Units/kg/hr (01/08/24 0953)    heparin (porcine)  1,200 Units Intravenous Q6H PRN Bautista Haider DO      heparin (porcine)  2,400 Units Intravenous Q6H PRN Bautista Haider DO      ipratropium  0.5 mg Nebulization TID Bautista Haider DO      levalbuterol  1.25 mg Nebulization TID Bautista Haider DO      nicotine  1 patch Transdermal Daily Mathew Rust MD      ondansetron  4 mg Intravenous Q6H PRN Mathew Rust MD      remdesivir  100 mg Intravenous Q24H Mathew Rust MD      warfarin  5 mg Oral Daily (warfarin) Bautista Haider DO          Today, Patient Was Seen By: Bautista Haider DO    **Please Note: This note may have been constructed using a voice recognition system.**

## 2024-01-08 NOTE — RESPIRATORY THERAPY NOTE
RT Protocol Note  Loretta Partida 58 y.o. female MRN: [id number]  Unit/Bed#: -01 Encounter: [id number]    Assessment    Principal Problem:    Acute respiratory failure with hypoxia (HCC)  Active Problems:    COVID-19    COPD (chronic obstructive pulmonary disease) (HCC)    Multiple sclerosis (HCC)    Thoracic outlet syndrome    Hyponatremia    Severe protein-calorie malnutrition (HCC)    Positive blood culture    Hypomagnesemia      Home Pulmonary Medications:     01/07/24 2005   Respiratory Protocol   Protocol Initiated? No   Protocol Selection Respiratory   Language Barrier? No   Medical & Social History Reviewed? Yes   Diagnostic Studies Reviewed? Yes   Physical Assessment Performed? Yes   Respiratory Plan Mild Distress pathway   Respiratory Assessment   Assessment Type During-treatment   General Appearance Alert;Awake   Respiratory Pattern Normal   Chest Assessment Chest expansion symmetrical   Bilateral Breath Sounds Diminished;Expiratory wheezes   Cough None   Resp Comments Will continue with current tx plan   O2 Device MFNC   Cough Description   Sputum Amount None   Additional Assessments   Pulse 101   Respirations 20   SpO2 99 %            Past Medical History:   Diagnosis Date    COPD (chronic obstructive pulmonary disease) (HCC) 1/5/2024    Multiple sclerosis (HCC) 1/5/2024    Osteoporosis     Thoracic outlet syndrome 1/5/2024     Social History     Socioeconomic History    Marital status: /Civil Union     Spouse name: None    Number of children: None    Years of education: None    Highest education level: None   Occupational History    None   Tobacco Use    Smoking status: Every Day     Current packs/day: 0.50     Average packs/day: 0.5 packs/day for 30.0 years (15.0 ttl pk-yrs)     Types: Cigarettes     Start date: 1/5/1994    Smokeless tobacco: Never   Substance and Sexual Activity    Alcohol use: Not Currently    Drug use: Never    Sexual activity: None   Other Topics Concern    None   Social  "History Narrative    None     Social Determinants of Health     Financial Resource Strain: Not on file   Food Insecurity: Not on file   Transportation Needs: Not on file   Physical Activity: Not on file   Stress: Not on file   Social Connections: Not on file   Intimate Partner Violence: Not on file   Housing Stability: Not on file       Subjective         Objective    Physical Exam:   Assessment Type: During-treatment  General Appearance: Alert, Awake  Respiratory Pattern: Normal  Chest Assessment: Chest expansion symmetrical  Bilateral Breath Sounds: Diminished, Expiratory wheezes  Cough: None  O2 Device: [facility]    Vitals:  Blood pressure 107/66, pulse 101, temperature 97.9 °F (36.6 °C), resp. rate 20, height 5' 3\" (1.6 m), weight 34.7 kg (76 lb 8 oz), SpO2 99%.          Imaging and other studies: I have personally reviewed pertinent reports.      O2 Device: NC     Plan    Respiratory Plan: Mild Distress pathway        Resp Comments: Will continue with current tx plan   "

## 2024-01-08 NOTE — RESPIRATORY THERAPY NOTE
"RT Protocol Note  Loretta Partida 58 y.o. female MRN: [id number]  Unit/Bed#: -01 Encounter: [id number]    Assessment    Principal Problem:    Acute respiratory failure with hypoxia (HCC)  Active Problems:    COVID-19    COPD (chronic obstructive pulmonary disease) (HCC)    Multiple sclerosis (HCC)    Thoracic outlet syndrome    Hyponatremia    Severe protein-calorie malnutrition (HCC)    Positive blood culture    Hypomagnesemia  Physical Exam:   Assessment Type: During-treatment  General Appearance: Sleeping  Respiratory Pattern: Normal  Chest Assessment: Chest expansion symmetrical  Bilateral Breath Sounds: Diminished  O2 Device: MFNC    Vitals:  Blood pressure 112/65, pulse 88, temperature 98.8 °F (37.1 °C), resp. rate 19, height 5' 3\" (1.6 m), weight 34.7 kg (76 lb 8 oz), SpO2 92%.      O2 Device: MFNC     Plan    Respiratory Plan: Mild Distress pathway        Resp Comments: will cont w current tx plan, pt on 7L MFNC will wean when able   "

## 2024-01-08 NOTE — PLAN OF CARE
Problem: PAIN - ADULT  Goal: Verbalizes/displays adequate comfort level or baseline comfort level  Description: Interventions:  - Encourage patient to monitor pain and request assistance  - Assess pain using appropriate pain scale  - Administer analgesics based on type and severity of pain and evaluate response  - Implement non-pharmacological measures as appropriate and evaluate response  - Consider cultural and social influences on pain and pain management  - Notify physician/advanced practitioner if interventions unsuccessful or patient reports new pain  Outcome: Progressing     Problem: INFECTION - ADULT  Goal: Absence or prevention of progression during hospitalization  Description: INTERVENTIONS:  - Assess and monitor for signs and symptoms of infection  - Monitor lab/diagnostic results  - Monitor all insertion sites, i.e. indwelling lines, tubes, and drains  - Monitor endotracheal if appropriate and nasal secretions for changes in amount and color  - [place] appropriate cooling/warming therapies per order  - Administer medications as ordered  - Instruct and encourage patient and family to use good hand hygiene technique  - Identify and instruct in appropriate isolation precautions for identified infection/condition  Outcome: Progressing  Goal: Absence of fever/infection during neutropenic period  Description: INTERVENTIONS:  - Monitor WBC    Outcome: Progressing     Problem: SAFETY ADULT  Goal: Patient will remain free of falls  Description: INTERVENTIONS:  - Educate patient/family on patient safety including physical limitations  - Instruct patient to call for assistance with activity   - Consult OT/PT to assist with strengthening/mobility   - Keep Call bell within reach  - Keep bed low and locked with side rails adjusted as appropriate  - Keep care items and personal belongings within reach  - Initiate and maintain comfort rounds  - Apply yellow socks and bracelet for high fall risk patients  - Consider  moving patient to room near nurses station  Outcome: Progressing  Goal: Maintain or return to baseline ADL function  Description: INTERVENTIONS:  -  Assess patient's ability to carry out ADLs; assess patient's baseline for ADL function and identify physical deficits which impact ability to perform ADLs (bathing, care of mouth/teeth, toileting, grooming, dressing, etc.)  - Assess/evaluate cause of self-care deficits   - Assess range of motion  - Assess patient's mobility; develop plan if impaired  - Assess patient's need for assistive devices and provide as appropriate  - Encourage maximum independence but intervene and supervise when necessary  - Involve family in performance of ADLs  - Assess for home care needs following discharge   - Consider OT consult to assist with ADL evaluation and planning for discharge  - Provide patient education as appropriate  Outcome: Progressing  Goal: Maintains/Returns to pre admission functional level  Description: INTERVENTIONS:  - Perform AM-PAC 6 Click Basic Mobility/ Daily Activity assessment daily.  - Set and communicate daily mobility goal to care team and patient/family/caregiver.   - Collaborate with rehabilitation services on mobility goals if consulted  - Out of bed for toileting  - Record patient progress and toleration of activity level   Outcome: Progressing     Problem: DISCHARGE PLANNING  Goal: Discharge to home or other facility with appropriate resources  Description: INTERVENTIONS:  - Identify barriers to discharge w/patient and caregiver  - Arrange for needed discharge resources and transportation as appropriate  - Identify discharge learning needs (meds, wound care, etc.)  - Arrange for interpretive services to assist at discharge as needed  - Refer to Case Management Department for coordinating discharge planning if the patient needs post-hospital services based on physician/advanced practitioner order or complex needs related to functional status, cognitive  ability, or social support system  Outcome: Progressing     Problem: Knowledge Deficit  Goal: Patient/family/caregiver demonstrates understanding of disease process, treatment plan, medications, and discharge instructions  Description: Complete learning assessment and assess knowledge base.  Interventions:  - Provide teaching at level of understanding  - Provide teaching via preferred learning methods  Outcome: Progressing     Problem: RESPIRATORY - ADULT  Goal: Achieves optimal ventilation and oxygenation  Description: INTERVENTIONS:  - Assess for changes in respiratory status  - Assess for changes in mentation and behavior  - Position to facilitate oxygenation and minimize respiratory effort  - Oxygen administered by appropriate delivery if ordered  - Initiate smoking cessation education as indicated  - Encourage broncho-pulmonary hygiene including cough, deep breathe, Incentive Spirometry  - Assess the need for suctioning and aspirate as needed  - Assess and instruct to report SOB or any respiratory difficulty  - Respiratory Therapy support as indicated  Outcome: Progressing     Problem: Prexisting or High Potential for Compromised Skin Integrity  Goal: Skin integrity is maintained or improved  Description: INTERVENTIONS:  - Identify patients at risk for skin breakdown  - Assess and monitor skin integrity  - Assess and monitor nutrition and hydration status  - Monitor labs   - Assess for incontinence   - Turn and reposition patient  - Assist with mobility/ambulation  - Relieve pressure over bony prominences  - Avoid friction and shearing  - Provide appropriate hygiene as needed including keeping skin clean and dry  - Evaluate need for skin moisturizer/barrier cream  - Collaborate with interdisciplinary team   - Patient/family teaching  - Consider wound care consult   Outcome: Progressing

## 2024-01-08 NOTE — CASE MANAGEMENT
Case Management Discharge Planning Note    Patient name Loretta Partida  Location /-01 MRN [id number]  : 1965 Date 2024       Current Admission Date: 2024  Current Admission Diagnosis:Acute respiratory failure with hypoxia (HCC)   Patient Active Problem List    Diagnosis Date Noted    Severe protein-calorie malnutrition (HCC) 2024    Positive blood culture 2024    Hypomagnesemia 2024    Acute respiratory failure with hypoxia (HCC) 2024    COVID-19 2024    COPD (chronic obstructive pulmonary disease) (HCC) 2024    Multiple sclerosis (HCC) 2024    Thoracic outlet syndrome 2024    Hyponatremia 2024      LOS (days): 3  Geometric Mean LOS (GMLOS) (days):   Days to GMLOS:     OBJECTIVE:  Risk of Unplanned Readmission Score: 16.18         Current admission status: Inpatient   [facility]erred Pharmacy:   [id number] [address]  Phone: [phone] Fax: [phone]    Primary Care Provider: Jordi Morse MD    Primary Insurance: BLUE CROSS  Secondary Insurance:     DISCHARGE DETAILS:     Per rounding with slim patient is due to be discharged in 48/72 hours pending medical clearance.

## 2024-01-08 NOTE — PROGRESS NOTES
Loretta Partida is a 58 y.o. female who is currently ordered Vancomycin IV with management by the Pharmacy Consult service.  Relevant clinical data and objective / subjective history reviewed.  Vancomycin Assessment:  Indication and Goal AUC/Trough: Bacteremia (goal -600, trough >10)  Clinical Status: stable  Micro:   No new results  Renal Function:  SCr: 0.4 mg/dL  CrCl: 82 mL/min  Renal replacement: Not on dialysis  Days of Therapy: 2  Current Dose: 750mg IV q8h, 24 level 7.7  Vancomycin Plan:  New Dosinmg IV q12h  Estimated AUC: 525 mcg*hr/mL  Estimated Trough: 13.4 mcg/mL  Next Level: 1/15/24  Renal Function Monitoring: Daily BMP and UOP  Pharmacy will continue to follow closely for s/sx of nephrotoxicity, infusion reactions and appropriateness of therapy.  BMP and CBC will be ordered per protocol. We will continue to follow the patient’s culture results and clinical progress daily.    Inna Torres, Pharmacist

## 2024-01-09 LAB
ALL TARGETS: NOT DETECTED
ANION GAP SERPL CALCULATED.3IONS-SCNC: 2 MMOL/L
APTT PPP: 57 SECONDS (ref 23–37)
APTT PPP: 59 SECONDS (ref 23–37)
APTT PPP: 68 SECONDS (ref 23–37)
APTT PPP: 95 SECONDS (ref 23–37)
BACTERIA BLD CULT: ABNORMAL
BUN SERPL-MCNC: 9 MG/DL (ref 5–25)
CALCIUM SERPL-MCNC: 7.6 MG/DL (ref 8.4–10.2)
CHLORIDE SERPL-SCNC: 87 MMOL/L (ref 96–108)
CO2 SERPL-SCNC: 43 MMOL/L (ref 21–32)
CREAT SERPL-MCNC: 0.22 MG/DL (ref 0.6–1.3)
ERYTHROCYTE [DISTWIDTH] IN BLOOD BY AUTOMATED COUNT: 12.1 % (ref 11.6–15.1)
GFR SERPL CREATININE-BSD FRML MDRD: 139 ML/MIN/1.73SQ M
GLUCOSE SERPL-MCNC: 113 MG/DL (ref 65–140)
GRAM STN SPEC: ABNORMAL
HCT VFR BLD AUTO: 36.4 % (ref 34.8–46.1)
HGB BLD-MCNC: 12 G/DL (ref 11.5–15.4)
INR PPP: 1.62 (ref 0.84–1.19)
MAGNESIUM SERPL-MCNC: 1.6 MG/DL (ref 1.9–2.7)
MCH RBC QN AUTO: 32.7 PG (ref 26.8–34.3)
MCHC RBC AUTO-ENTMCNC: 33 G/DL (ref 31.4–37.4)
MCV RBC AUTO: 99 FL (ref 82–98)
PLATELET # BLD AUTO: 184 THOUSANDS/UL (ref 149–390)
PMV BLD AUTO: 9.4 FL (ref 8.9–12.7)
POTASSIUM SERPL-SCNC: 3.3 MMOL/L (ref 3.5–5.3)
PROTHROMBIN TIME: 20 SECONDS (ref 11.6–14.5)
RBC # BLD AUTO: 3.67 MILLION/UL (ref 3.81–5.12)
SODIUM SERPL-SCNC: 132 MMOL/L (ref 135–147)
WBC # BLD AUTO: 6.36 THOUSAND/UL (ref 4.31–10.16)

## 2024-01-09 PROCEDURE — 99232 SBSQ HOSP IP/OBS MODERATE 35: CPT | Performed by: STUDENT IN AN ORGANIZED HEALTH CARE EDUCATION/TRAINING PROGRAM

## 2024-01-09 PROCEDURE — 85730 THROMBOPLASTIN TIME PARTIAL: CPT | Performed by: INTERNAL MEDICINE

## 2024-01-09 PROCEDURE — 80048 BASIC METABOLIC PNL TOTAL CA: CPT | Performed by: INTERNAL MEDICINE

## 2024-01-09 PROCEDURE — 85730 THROMBOPLASTIN TIME PARTIAL: CPT | Performed by: STUDENT IN AN ORGANIZED HEALTH CARE EDUCATION/TRAINING PROGRAM

## 2024-01-09 PROCEDURE — 94664 DEMO&/EVAL PT USE INHALER: CPT

## 2024-01-09 PROCEDURE — 94640 AIRWAY INHALATION TREATMENT: CPT

## 2024-01-09 PROCEDURE — 83735 ASSAY OF MAGNESIUM: CPT | Performed by: INTERNAL MEDICINE

## 2024-01-09 PROCEDURE — 85027 COMPLETE CBC AUTOMATED: CPT | Performed by: INTERNAL MEDICINE

## 2024-01-09 PROCEDURE — 94760 N-INVAS EAR/PLS OXIMETRY 1: CPT

## 2024-01-09 PROCEDURE — 85610 PROTHROMBIN TIME: CPT | Performed by: INTERNAL MEDICINE

## 2024-01-09 RX ORDER — POTASSIUM CHLORIDE 20 MEQ/1
40 TABLET, EXTENDED RELEASE ORAL ONCE
Status: COMPLETED | OUTPATIENT
Start: 2024-01-09 | End: 2024-01-09

## 2024-01-09 RX ORDER — MAGNESIUM SULFATE HEPTAHYDRATE 40 MG/ML
2 INJECTION, SOLUTION INTRAVENOUS ONCE
Status: COMPLETED | OUTPATIENT
Start: 2024-01-09 | End: 2024-01-09

## 2024-01-09 RX ADMIN — LEVALBUTEROL HYDROCHLORIDE 1.25 MG: 1.25 SOLUTION RESPIRATORY (INHALATION) at 14:48

## 2024-01-09 RX ADMIN — REMDESIVIR 100 MG: 100 INJECTION, POWDER, LYOPHILIZED, FOR SOLUTION INTRAVENOUS at 03:22

## 2024-01-09 RX ADMIN — BUDESONIDE INHALATION 0.5 MG: 0.5 SUSPENSION RESPIRATORY (INHALATION) at 19:54

## 2024-01-09 RX ADMIN — MAGNESIUM SULFATE HEPTAHYDRATE 2 G: 40 INJECTION, SOLUTION INTRAVENOUS at 17:45

## 2024-01-09 RX ADMIN — HEPARIN SODIUM 1200 UNITS: 1000 INJECTION INTRAVENOUS; SUBCUTANEOUS at 07:58

## 2024-01-09 RX ADMIN — IPRATROPIUM BROMIDE 0.5 MG: 0.5 SOLUTION RESPIRATORY (INHALATION) at 19:54

## 2024-01-09 RX ADMIN — POTASSIUM CHLORIDE 40 MEQ: 1500 TABLET, EXTENDED RELEASE ORAL at 11:23

## 2024-01-09 RX ADMIN — IPRATROPIUM BROMIDE 0.5 MG: 0.5 SOLUTION RESPIRATORY (INHALATION) at 14:48

## 2024-01-09 RX ADMIN — HEPARIN SODIUM 1200 UNITS: 1000 INJECTION INTRAVENOUS; SUBCUTANEOUS at 20:42

## 2024-01-09 RX ADMIN — LEVALBUTEROL HYDROCHLORIDE 1.25 MG: 1.25 SOLUTION RESPIRATORY (INHALATION) at 08:20

## 2024-01-09 RX ADMIN — BARICITINIB 4 MG: 2 TABLET, FILM COATED ORAL at 11:23

## 2024-01-09 RX ADMIN — LEVALBUTEROL HYDROCHLORIDE 1.25 MG: 1.25 SOLUTION RESPIRATORY (INHALATION) at 19:54

## 2024-01-09 RX ADMIN — WARFARIN SODIUM 5 MG: 5 TABLET ORAL at 17:43

## 2024-01-09 RX ADMIN — BUDESONIDE INHALATION 0.5 MG: 0.5 SUSPENSION RESPIRATORY (INHALATION) at 08:20

## 2024-01-09 RX ADMIN — DEXAMETHASONE SODIUM PHOSPHATE 6 MG: 10 INJECTION, SOLUTION INTRAMUSCULAR; INTRAVENOUS at 03:30

## 2024-01-09 RX ADMIN — IPRATROPIUM BROMIDE 0.5 MG: 0.5 SOLUTION RESPIRATORY (INHALATION) at 08:20

## 2024-01-09 NOTE — UTILIZATION REVIEW
Continued Stay Review    Date: 1/9                          Current Patient Class: IP  Current Level of Care: MS    HPI:58 y.o. female initially admitted on  1/5    Assessment/Plan:     1/9 IM Note   O2 sat 91-93 % on 4-5 l . Maintain O2 sat >88 % . Mg replaced today , recheck in am . F/u repeat BC . Started on nebulized Atrovent, Xopenex, budesonide . IV decadron . Dec breath sounds .     Vital Signs:   01/09/24 0829 -- 88 20 -- -- -- -- -- -- -- -- --   01/09/24 0826 -- -- -- -- -- 90 % -- 48 -- 7 L/min Mid flow nasal cannula --   01/09/24 08:13:08 98.3 °F (36.8 °C) 98 14 122/73 89 93 % -- -- -- -- -- --   01/09/24 0806 -- -- -- -- -- -- -- 48 7 L/min 7 L/min Mid flow nasal cannula --   01/09/24 0534 -- -- -- -- -- -- -- 48           Pertinent Labs/Diagnostic Results:   Results from last 7 days   Lab Units 01/05/24  1836   SARS-COV-2  Positive*     Results from last 7 days   Lab Units 01/09/24  0551 01/08/24  1020 01/08/24  0535 01/07/24  0546 01/06/24  0437 01/05/24  1820   WBC Thousand/uL 6.36 2.94* 4.58 4.70 2.71* 4.76   HEMOGLOBIN g/dL 12.0 12.6 12.3 12.4 14.3 14.8   HEMATOCRIT % 36.4 38.5 37.8 37.5 41.4 43.3   PLATELETS Thousands/uL 184 173 171 149 145* 161   NEUTROS ABS Thousands/µL  --   --   --   --   --  3.31         Results from last 7 days   Lab Units 01/09/24  0551 01/08/24  0535 01/07/24  2026 01/07/24  0546 01/06/24  1803 01/06/24  1237 01/06/24  0437   SODIUM mmol/L 132* 131* 131* 128*  129* 125*   < > 127*   POTASSIUM mmol/L 3.3* 4.1 3.9 3.4*  3.4* 4.0   < > 4.5   CHLORIDE mmol/L 87* 88* 86* 88*  89* 84*   < > 85*   CO2 mmol/L 43* 42* 44* 39*  39* 41*   < > 38*   ANION GAP mmol/L 2 1 1 1  1 0   < > 4   BUN mg/dL 9 7 11 6  6 9   < > 7   CREATININE mg/dL 0.22* 0.24* 0.40* 0.22*  0.22* 0.32*   < > 0.26*   EGFR ml/min/1.73sq m 139 135 114 139  139 123   < > 131   CALCIUM mg/dL 7.6* 7.3* 7.5* 7.0*  6.9* 7.5*   < > 7.6*   MAGNESIUM mg/dL 1.6* 1.6*  --  1.5*  --   --  1.3*   PHOSPHORUS mg/dL  --    --   --   --   --   --  3.5    < > = values in this interval not displayed.     Results from last 7 days   Lab Units 01/05/24  1902   AST U/L 53*   ALT U/L 25   ALK PHOS U/L 50   TOTAL PROTEIN g/dL 6.5   ALBUMIN g/dL 4.0   TOTAL BILIRUBIN mg/dL 0.39         Results from last 7 days   Lab Units 01/09/24  0551 01/08/24  0535 01/07/24  2026 01/07/24  0546 01/06/24  1803 01/06/24  1237 01/06/24  0437 01/05/24  1902   GLUCOSE RANDOM mg/dL 113 115 90 143*  137 138 124 108 117     Results from last 7 days   Lab Units 01/05/24  1849   PH SYD  7.332   PCO2 SYD mm Hg 63.6*   PO2 SYD mm Hg 37.6   HCO3 SYD mmol/L 32.9*   BASE EXC SYD mmol/L 4.7   O2 CONTENT SYD ml/dL 14.6   O2 HGB, VENOUS % 65.1         Results from last 7 days   Lab Units 01/05/24  2311 01/05/24  2100 01/05/24  1820   HS TNI 0HR ng/L  --   --  92*   HS TNI 2HR ng/L  --  77*  --    HSTNI D2 ng/L  --  -15  --    HS TNI 4HR ng/L 73*  --   --    HSTNI D4 ng/L -19  --   --      Results from last 7 days   Lab Units 01/05/24  1847   D-DIMER QUANTITATIVE ug/ml FEU <0.27     Results from last 7 days   Lab Units 01/09/24  0551 01/09/24  0030 01/08/24  1525 01/08/24  1020 01/08/24  0535   PROTIME seconds 20.0*  --   --  18.7* 19.9*   INR  1.62*  --   --  1.48* 1.61*   PTT seconds 57* 95* 41* 34  --          Results from last 7 days   Lab Units 01/07/24  0546 01/06/24  0437   PROCALCITONIN ng/ml 0.05 0.13     Results from last 7 days   Lab Units 01/05/24  1820   LACTIC ACID mmol/L 1.2       Results from last 7 days   Lab Units 01/05/24  1820   BNP pg/mL 262*       Results from last 7 days   Lab Units 01/07/24  0546 01/06/24  0437   CRP mg/L <1.0 1.4       Results from last 7 days   Lab Units 01/06/24  0442   OSMO UR mmol/*     Results from last 7 days   Lab Units 01/06/24  0442   SODIUM UR  <10     Results from last 7 days   Lab Units 01/05/24  1836   INFLUENZA A PCR  Negative   INFLUENZA B PCR  Negative   RSV PCR  Negative         Results from last 7 days   Lab  Units 01/07/24  1415 01/07/24  1411 01/05/24  1824 01/05/24  1820   BLOOD CULTURE  No Growth at 24 hrs. No Growth at 24 hrs. Micrococcus luteus* No Growth at 72 hrs.   GRAM STAIN RESULT   --   --  Gram positive cocci in clusters*  --          Results from last 7 days   Lab Units 01/08/24  0535   VANCOMYCIN TR ug/mL 7.7*       Medications:   Scheduled Medications:  baricitinib, 4 mg, Oral, Q24H  budesonide, 0.5 mg, Nebulization, Q12H  dexamethasone, 6 mg, Intravenous, Q24H  ipratropium, 0.5 mg, Nebulization, TID  levalbuterol, 1.25 mg, Nebulization, TID  nicotine, 1 patch, Transdermal, Daily  remdesivir, 100 mg, Intravenous, Q24H  warfarin, 5 mg, Oral, Daily (warfarin)      Continuous IV Infusions:  heparin (porcine), 3-20 Units/kg/hr (Order-Specific), Intravenous, Titrated      PRN Meds:  acetaminophen, 650 mg, Oral, Q6H PRN  albuterol, 2 puff, Inhalation, Q4H PRN  heparin (porcine), 1,200 Units, Intravenous, Q6H PRN  heparin (porcine), 2,400 Units, Intravenous, Q6H PRN  ondansetron, 4 mg, Intravenous, Q6H PRN        Discharge Plan: D    Network Utilization Review Department  ATTENTION: Please call with any questions or concerns to [phone] and carefully listen to the prompts so that you are directed to the right person. All voicemails are confidential.   For Discharge needs, contact Care Management DC Support Team at [phone] opt. 2  Send all requests for admission clinical reviews, approved or denied determinations and any other requests to dedicated fax number below belonging to the campus where the patient is receiving treatment. List of dedicated fax numbers for the Facilities:  FACILITY NAME UR FAX NUMBER   ADMISSION DENIALS (Administrative/Medical Necessity) [phone]   DISCHARGE SUPPORT TEAM (NETWORK) [phone]   PARENT CHILD HEALTH (Maternity/NICU/Pediatrics) [phone]   [facility] [phone]   [facility] [phone]   [facility]  [facility] [phone]   [facility] [phone]   [facility] [phone]   [facility] [phone]   [facility] [phone]   [facility] [phone]   [facility] [phone]   [facility] [phone]   [facility] [phone]

## 2024-01-09 NOTE — RESPIRATORY THERAPY NOTE
RT Protocol Note  Loretta Partida 58 y.o. female MRN: [id number]  Unit/Bed#: -01 Encounter: [id number]    Assessment    Principal Problem:    Acute respiratory failure with hypoxia (HCC)  Active Problems:    COVID-19    COPD (chronic obstructive pulmonary disease) (HCC)    Multiple sclerosis (HCC)    Thoracic outlet syndrome    Hyponatremia    Severe protein-calorie malnutrition (HCC)    Positive blood culture    Hypomagnesemia      Home Pulmonary Medications:  inhalers       Past Medical History:   Diagnosis Date    COPD (chronic obstructive pulmonary disease) (HCC) 1/5/2024    Multiple sclerosis (HCC) 1/5/2024    Osteoporosis     Thoracic outlet syndrome 1/5/2024     Social History     Socioeconomic History    Marital status: /Civil Union     Spouse name: None    Number of children: None    Years of education: None    Highest education level: None   Occupational History    None   Tobacco Use    Smoking status: Every Day     Current packs/day: 0.50     Average packs/day: 0.5 packs/day for 30.0 years (15.0 ttl pk-yrs)     Types: Cigarettes     Start date: 1/5/1994    Smokeless tobacco: Never   Substance and Sexual Activity    Alcohol use: Not Currently    Drug use: Never    Sexual activity: None   Other Topics Concern    None   Social History Narrative    None     Social Determinants of Health     Financial Resource Strain: Not on file   Food Insecurity: Not on file   Transportation Needs: Not on file   Physical Activity: Not on file   Stress: Not on file   Social Connections: Not on file   Intimate Partner Violence: Not on file   Housing Stability: Not on file       Subjective         Objective    Physical Exam:   Assessment Type: During-treatment  General Appearance: Awake, Alert  Respiratory Pattern: Normal  Chest Assessment: Chest expansion symmetrical  Bilateral Breath Sounds: Diminished  Cough: Congested, Non-productive  O2 Device: [facility]    Vitals:  Blood pressure 122/73, pulse 88, temperature 98.3 °F  "(36.8 °C), resp. rate 20, height 5' 3\" (1.6 m), weight 34.7 kg (76 lb 8 oz), SpO2 90%.          Imaging and other studies: I have personally reviewed pertinent reports.      O2 Device: MFNC     Plan    Respiratory Plan: Mild Distress pathway        Resp Comments: Pt resting in bed in no apparent distress.  Offers no complaints.  Will cont w/ baldomero tx.   "

## 2024-01-09 NOTE — CONSULTS
The patient's vancomycin therapy has been completed/discontinued. Thank you for this consult; pharmacy will sign-off now

## 2024-01-09 NOTE — PLAN OF CARE
Problem: INFECTION - ADULT  Goal: Absence or prevention of progression during hospitalization  Description: INTERVENTIONS:  - Assess and monitor for signs and symptoms of infection  - Monitor lab/diagnostic results  - Monitor all insertion sites, i.e. indwelling lines, tubes, and drains  - Monitor endotracheal if appropriate and nasal secretions for changes in amount and color  - [place] appropriate cooling/warming therapies per order  - Administer medications as ordered  - Instruct and encourage patient and family to use good hand hygiene technique  - Identify and instruct in appropriate isolation precautions for identified infection/condition  Outcome: Progressing     Problem: DISCHARGE PLANNING  Goal: Discharge to home or other facility with appropriate resources  Description: INTERVENTIONS:  - Identify barriers to discharge w/patient and caregiver  - Arrange for needed discharge resources and transportation as appropriate  - Identify discharge learning needs (meds, wound care, etc.)  - Arrange for interpretive services to assist at discharge as needed  - Refer to Case Management Department for coordinating discharge planning if the patient needs post-hospital services based on physician/advanced practitioner order or complex needs related to functional status, cognitive ability, or social support system  Outcome: Progressing     Problem: RESPIRATORY - ADULT  Goal: Achieves optimal ventilation and oxygenation  Description: INTERVENTIONS:  - Assess for changes in respiratory status  - Assess for changes in mentation and behavior  - Position to facilitate oxygenation and minimize respiratory effort  - Oxygen administered by appropriate delivery if ordered  - Initiate smoking cessation education as indicated  - Encourage broncho-pulmonary hygiene including cough, deep breathe, Incentive Spirometry  - Assess the need for suctioning and aspirate as needed  - Assess and instruct to report SOB or any respiratory  difficulty  - Respiratory Therapy support as indicated  Outcome: Progressing     Problem: Prexisting or High Potential for Compromised Skin Integrity  Goal: Skin integrity is maintained or improved  Description: INTERVENTIONS:  - Identify patients at risk for skin breakdown  - Assess and monitor skin integrity  - Assess and monitor nutrition and hydration status  - Monitor labs   - Assess for incontinence   - Turn and reposition patient  - Assist with mobility/ambulation  - Relieve pressure over bony prominences  - Avoid friction and shearing  - Provide appropriate hygiene as needed including keeping skin clean and dry  - Evaluate need for skin moisturizer/barrier cream  - Collaborate with interdisciplinary team   - Patient/family teaching  - Consider wound care consult   Outcome: Progressing

## 2024-01-09 NOTE — PROGRESS NOTES
[facility]  Progress Note  Name: Loretta Partida I  MRN: [id number]  Unit/Bed#: -01 I Date of Admission: 1/5/2024   Date of Service: 1/9/2024 I Hospital Day: 4    Assessment/Plan   * Acute respiratory failure with hypoxia (HCC)  Assessment & Plan  Acute respiratory failure with hypoxia secondary to COVID-19 infection superimposed on baseline COPD  Currently over saturation 91 to 93% on 4 to 5 L nasal cannula.  Continue to monitor and titrate as needed to maintain oxygen saturation greater than 88%  See treatment plan below for COVID    Hypomagnesemia  Assessment & Plan  Remains low; will replace with IV magnesium 2 g again today.  Recheck in the morning    Positive blood culture  Assessment & Plan  1 out of 2 blood cultures on admission growing Micrococcus luteus  This appears consistent with contamination  Will discontinue vancomycin and monitor off antibiotic therapy  Follow-up repeat cultures    Severe protein-calorie malnutrition (HCC)  Assessment & Plan  Malnutrition Findings:   Adult Malnutrition type: Chronic illness  Adult Degree of Malnutrition: Other severe protein calorie malnutrition  Malnutrition Characteristics: Inadequate energy, Weight loss                  360 Statement: Other severe malnutrition related to increased energy-protein needs in the context of chronic illness (COPD, MS) as evidenced by consuming < 75% of energy intake compared to estimated needs for > 1 month and reported 24% weight loss in 1 year. Treated with diet and ONS.    BMI Findings:  Adult BMI Classifications: Underweight < 18.5        Body mass index is 13.55 kg/m².       Hyponatremia  Assessment & Plan  Severe hyponatremia on admission at 117; probably secondary to SIADH  Now improving with fluid restriction  Currently sodium 132.  Continue to monitor daily BMP  Nephrology recommendation appreciated.    Thoracic outlet syndrome  Assessment & Plan  INR subtherapeutic on 1/8; will initiate heparin  drip  Monitor PTT per protocol  Continue Coumadin 5 mg daily with daily INR checks    Multiple sclerosis ([facility])  Assessment & Plan  The patient is on some pain medications and Valium as needed    COPD (chronic obstructive pulmonary disease) ([facility])  Assessment & Plan  Continue home inhaler regimen  Very poor air movement, suspect severe underlying disease  Will discontinue inhalers and change to nebulized regimen  Started on nebulized Atrovent, Xopenex, budesonide  IV Decadron per COVID protocol    COVID-19  Assessment & Plan  Patient presented with progressive shortness of breath and dyspnea and found to be positive for COVID in the emergency room  This is superimposed on baseline COPD, active smoking    Continue IV remdesivir x 5 days  IV Decadron 6 mg daily x 10 days  CRP < 1: will dc baricitinib.  Wean O2 as tolerated  Patient is currently on Coumadin currently subtherapeutic.  Currently patient is on IV heparin drip for bridging to Coumadin.  Procalcitonin negative x 2; continue to monitor off antibiotics  Trend inflammatory markers               VTE Pharmacologic Prophylaxis: VTE Score: 5 Moderate Risk (Score 3-4) - Pharmacological DVT Prophylaxis Ordered: heparin drip.    Mobility:   Basic Mobility Inpatient Raw Score: 20  -[facility] Goal: 6: Walk 10 steps or more  J[facility]-HLM Achieved: 4: Move to chair/commode      Patient Centered Rounds: I performed bedside rounds with nursing staff today.       Total Time Spent on Date of Encounter in care of patient: 25 mins. This time was spent on one or more of the following: performing physical exam; counseling and coordination of care; obtaining or reviewing history; documenting in the medical record; reviewing/ordering tests, medications or procedures; communicating with other healthcare professionals and discussing with patient's family/caregivers.    Current Length of Stay: 4 day(s)  Current Patient Status: Inpatient   Certification Statement: The patient will continue to  require additional inpatient hospital stay due to hypoxia  Discharge Plan: Anticipate discharge in 48-72 hrs to discharge location to be determined pending rehab evaluations.    Code Status: Level 1 - Full Code    Subjective:   Seen during a.m. rounds.  Oxygen noted 91 to 93% on 4 to 5 L nasal cannula.  Patient reports that she is eager to go home.  Denies any new complaints.  No other events reported.    Objective:     Vitals:   Temp (24hrs), Av.9 °F (36.6 °C), Min:97.7 °F (36.5 °C), Max:98.3 °F (36.8 °C)    Temp:  [97.7 °F (36.5 °C)-98.3 °F (36.8 °C)] 97.7 °F (36.5 °C)  HR:  [85-99] 99  Resp:  [14-20] 20  BP: (122-127)/(73-81) 127/81  SpO2:  [88 %-96 %] 92 %  Body mass index is 13.55 kg/m².     Input and Output Summary (last 24 hours):     Intake/Output Summary (Last 24 hours) at 2024 1707  Last data filed at 2024 1224  Gross per 24 hour   Intake 805 ml   Output 900 ml   Net -95 ml       Physical Exam:   Physical Exam  Constitutional:       Comments: Elderly female patient, appears older than stated age, cachectic, chronically ill and deconditioned, acutely nontoxic appearing.   HENT:      Head: Normocephalic and atraumatic.   Eyes:      Pupils: Pupils are equal, round, and reactive to light.   Cardiovascular:      Rate and Rhythm: Normal rate.      Pulses: Normal pulses.   Pulmonary:      Effort: Pulmonary effort is normal. No respiratory distress.      Comments: Decreased lung sounds however patient is not in acute respiratory distress.  Able to speak in full sentences.  O2 saturation 91 to 93% on 4 to 5 L nasal cannula.  Abdominal:      General: Bowel sounds are normal. There is no distension.      Palpations: Abdomen is soft.      Tenderness: There is no abdominal tenderness.   Musculoskeletal:      Cervical back: No rigidity.      Right lower leg: No edema.      Left lower leg: No edema.   Neurological:      Mental Status: She is alert and oriented to person, place, and time. Mental status is at  baseline.   Psychiatric:         Mood and Affect: Mood normal.         Behavior: Behavior normal.          Additional Data:     Labs:  Results from last 7 days   Lab Units 01/09/24  0551 01/06/24 0437 01/05/24  1820   WBC Thousand/uL 6.36   < > 4.76   HEMOGLOBIN g/dL 12.0   < > 14.8   HEMATOCRIT % 36.4   < > 43.3   PLATELETS Thousands/uL 184   < > 161   NEUTROS PCT %  --   --  70   LYMPHS PCT %  --   --  17   MONOS PCT %  --   --  13*   EOS PCT %  --   --  0    < > = values in this interval not displayed.     Results from last 7 days   Lab Units 01/09/24  0551 01/06/24  0437 01/05/24  1902   SODIUM mmol/L 132*   < > 117*   POTASSIUM mmol/L 3.3*   < > 4.8   CHLORIDE mmol/L 87*   < > 77*   CO2 mmol/L 43*   < > 33*   BUN mg/dL 9   < > 14   CREATININE mg/dL 0.22*   < > 0.36*   ANION GAP mmol/L 2   < > 7   CALCIUM mg/dL 7.6*   < > 8.1*   ALBUMIN g/dL  --   --  4.0   TOTAL BILIRUBIN mg/dL  --   --  0.39   ALK PHOS U/L  --   --  50   ALT U/L  --   --  25   AST U/L  --   --  53*   GLUCOSE RANDOM mg/dL 113   < > 117    < > = values in this interval not displayed.     Results from last 7 days   Lab Units 01/09/24  0551   INR  1.62*             Results from last 7 days   Lab Units 01/07/24  0546 01/06/24  0437 01/05/24  1820   LACTIC ACID mmol/L  --   --  1.2   PROCALCITONIN ng/ml 0.05 0.13  --        Lines/Drains:  Invasive Devices       Peripheral Intravenous Line  Duration             Peripheral IV 01/05/24 Right Antecubital 3 days    Peripheral IV 01/08/24 Left;Ventral (anterior) Forearm 1 day                        Recent Cultures (last 7 days):   Results from last 7 days   Lab Units 01/07/24  1415 01/07/24  1411 01/05/24  1824 01/05/24  1820   BLOOD CULTURE  No Growth at 24 hrs. No Growth at 24 hrs. Micrococcus luteus* No Growth at 72 hrs.   GRAM STAIN RESULT   --   --  Gram positive cocci in clusters*  --        Last 24 Hours Medication List:   Current Facility-Administered Medications   Medication Dose Route Frequency  Provider Last Rate    acetaminophen  650 mg Oral Q6H PRN Mathew Rust MD      albuterol  2 puff Inhalation Q4H PRN Mathew Rust MD      budesonide  0.5 mg Nebulization Q12H Bautista Haider DO      dexamethasone  6 mg Intravenous Q24H Mathew Rust MD      heparin (porcine)  3-20 Units/kg/hr (Order-Specific) Intravenous Titrated Bautista Haider DO 16 Units/kg/hr (01/09/24 0759)    heparin (porcine)  1,200 Units Intravenous Q6H PRN Bautista Haider DO      heparin (porcine)  2,400 Units Intravenous Q6H PRN Bautista Haider DO      ipratropium  0.5 mg Nebulization TID Bautista Haider DO      levalbuterol  1.25 mg Nebulization TID Bautista Haider DO      magnesium sulfate  2 g Intravenous Once Xander MICHELE MD      nicotine  1 patch Transdermal Daily Mathew Rust MD      ondansetron  4 mg Intravenous Q6H PRN Mathew Rust MD      remdesivir  100 mg Intravenous Q24H Mathew Rust MD      warfarin  5 mg Oral Daily (warfarin) Bautista Haider DO          Today, Patient Was Seen By: Xander Palafox MD    **Please Note: This note may have been constructed using a voice recognition system.**

## 2024-01-09 NOTE — CASE MANAGEMENT
Case Management Assessment & Discharge Planning Note    Patient name Loretta Partida  Location /-01 MRN [id number]  : 1965 Date 2024       Current Admission Date: 2024  Current Admission Diagnosis:Acute respiratory failure with hypoxia (HCC)   Patient Active Problem List    Diagnosis Date Noted    Severe protein-calorie malnutrition (HCC) 2024    Positive blood culture 2024    Hypomagnesemia 2024    Acute respiratory failure with hypoxia (HCC) 2024    COVID-19 2024    COPD (chronic obstructive pulmonary disease) (HCC) 2024    Multiple sclerosis (HCC) 2024    Thoracic outlet syndrome 2024    Hyponatremia 2024      LOS (days): 4  Geometric Mean LOS (GMLOS) (days):   Days to GMLOS:     OBJECTIVE:    Risk of Unplanned Readmission Score: 14.67         Current admission status: Inpatient       Preferred Pharmacy:   [facility]/pharmacy #[id number] [address] [address]  Phone: [phone] Fax: [phone]    Primary Care Provider: Jordi Morse MD    Primary Insurance: BLUE CROSS  Secondary Insurance:     ASSESSMENT:  Active Health Care Proxies    There are no active Health Care Proxies on file.                 Readmission Root Cause  30 Day Readmission: No    Patient Information  Admitted from:: Home  Mental Status: Alert  During Assessment patient was accompanied by: Spouse  Assessment information provided by:: Spouse, Patient  Primary Caregiver: Self  Support Systems: Spouse/significant other  County of Residence: [place]  What city do you live in?: [place]  Home entry access options. Select all that apply.: Stairs  Number of steps to enter home.: 4 (3-4 steps to enter)  Type of Current Residence: [facility]  Living Arrangements: Lives w/ Spouse/significant other  Is patient a ?: No    Activities of Daily Living Prior to Admission  Functional Status:  Assistance  Completes ADLs independently?: No  Level of ADL dependence: Assistance  Ambulates independently?: No  Level of ambulatory dependence: Assistance  Does patient use assisted devices?: Yes  Assisted Devices (DME) used: Rollator, Shower Chair, Straight Cane, Other (Comment) (Transport chair)  Does patient currently own DME?: Yes  What DME does the patient currently own?: Rollator, Straight Cane, Shower Chair  Does patient have a history of Outpatient Therapy (PT/OT)?: No  Does the patient have a history of Short-Term Rehab?: No  Does patient have a history of HHC?: No  Does patient currently have HHC?: No         Patient Information Continued  Income Source: Unemployed  Does patient have prescription coverage?: Yes  Does patient receive dialysis treatments?: No  Does patient have a history of substance abuse?: No  Does patient have a history of Mental Health Diagnosis?: No    PHQ 2/9 Screening   Reviewed PHQ 2/9 Depression Screening Score?: No    Means of Transportation  Means of Transport to Appts:: Family transport      Housing Stability: Low Risk  (1/9/2024)    Housing Stability Vital Sign     Unable to Pay for Housing in the Last Year: No     Number of Places Lived in the Last Year: 1     Unstable Housing in the Last Year: No   Food Insecurity: No Food Insecurity (1/9/2024)    Hunger Vital Sign     Worried About Running Out of Food in the Last Year: Never true     Ran Out of Food in the Last Year: Never true   Transportation Needs: No Transportation Needs (1/9/2024)    PRAPARE - Transportation     Lack of Transportation (Medical): No     Lack of Transportation (Non-Medical): No   Utilities: Not At Risk (1/9/2024)    C Utilities     Threatened with loss of utilities: No       DISCHARGE DETAILS:    Discharge planning discussed with::  and patient via phone  Freedom of Choice: Yes  Comments - Freedom of Choice: CM discussed freedom of choice as it pertains to discharge planning. Patient and   denied having any needs.  CM contacted family/caregiver?: Yes  Were Treatment Team discharge recommendations reviewed with patient/caregiver?: Yes  Did patient/caregiver verbalize understanding of patient care needs?: Yes  Were patient/caregiver advised of the risks associated with not following Treatment Team discharge recommendations?: Yes         Requested Home Health Care         Is the patient interested in HHC at discharge?: No    DME Referral Provided  Referral made for DME?: No    Other Referral/Resources/Interventions Provided:  Interventions: None Indicated    Would you like to participate in our [facility] service program?  : No - Declined    Treatment Team Recommendation: Home  Discharge Destination Plan:: Home  Transport at Discharge : Family

## 2024-01-10 LAB
ANION GAP SERPL CALCULATED.3IONS-SCNC: 6 MMOL/L
APTT PPP: 103 SECONDS (ref 23–37)
APTT PPP: 55 SECONDS (ref 23–37)
APTT PPP: 85 SECONDS (ref 23–37)
BACTERIA BLD CULT: NORMAL
BUN SERPL-MCNC: 9 MG/DL (ref 5–25)
CALCIUM SERPL-MCNC: 8.4 MG/DL (ref 8.4–10.2)
CHLORIDE SERPL-SCNC: 88 MMOL/L (ref 96–108)
CO2 SERPL-SCNC: 37 MMOL/L (ref 21–32)
CREAT SERPL-MCNC: 0.26 MG/DL (ref 0.6–1.3)
CRP SERPL QL: 1.3 MG/L
D DIMER PPP FEU-MCNC: <0.27 UG/ML FEU
ERYTHROCYTE [DISTWIDTH] IN BLOOD BY AUTOMATED COUNT: 12 % (ref 11.6–15.1)
ERYTHROCYTE [SEDIMENTATION RATE] IN BLOOD: 2 MM/HOUR (ref 0–29)
GFR SERPL CREATININE-BSD FRML MDRD: 131 ML/MIN/1.73SQ M
GLUCOSE SERPL-MCNC: 112 MG/DL (ref 65–140)
HCT VFR BLD AUTO: 38.9 % (ref 34.8–46.1)
HGB BLD-MCNC: 13.6 G/DL (ref 11.5–15.4)
INR PPP: 1.73 (ref 0.84–1.19)
MCH RBC QN AUTO: 33.7 PG (ref 26.8–34.3)
MCHC RBC AUTO-ENTMCNC: 35 G/DL (ref 31.4–37.4)
MCV RBC AUTO: 96 FL (ref 82–98)
PLATELET # BLD AUTO: 220 THOUSANDS/UL (ref 149–390)
PMV BLD AUTO: 9.1 FL (ref 8.9–12.7)
POTASSIUM SERPL-SCNC: 3.4 MMOL/L (ref 3.5–5.3)
PROTHROMBIN TIME: 21.1 SECONDS (ref 11.6–14.5)
RBC # BLD AUTO: 4.04 MILLION/UL (ref 3.81–5.12)
SODIUM SERPL-SCNC: 131 MMOL/L (ref 135–147)
WBC # BLD AUTO: 8.14 THOUSAND/UL (ref 4.31–10.16)

## 2024-01-10 PROCEDURE — 85027 COMPLETE CBC AUTOMATED: CPT | Performed by: STUDENT IN AN ORGANIZED HEALTH CARE EDUCATION/TRAINING PROGRAM

## 2024-01-10 PROCEDURE — 85379 FIBRIN DEGRADATION QUANT: CPT | Performed by: INTERNAL MEDICINE

## 2024-01-10 PROCEDURE — 99232 SBSQ HOSP IP/OBS MODERATE 35: CPT | Performed by: STUDENT IN AN ORGANIZED HEALTH CARE EDUCATION/TRAINING PROGRAM

## 2024-01-10 PROCEDURE — 86140 C-REACTIVE PROTEIN: CPT | Performed by: INTERNAL MEDICINE

## 2024-01-10 PROCEDURE — 94640 AIRWAY INHALATION TREATMENT: CPT

## 2024-01-10 PROCEDURE — 80048 BASIC METABOLIC PNL TOTAL CA: CPT | Performed by: STUDENT IN AN ORGANIZED HEALTH CARE EDUCATION/TRAINING PROGRAM

## 2024-01-10 PROCEDURE — 94760 N-INVAS EAR/PLS OXIMETRY 1: CPT

## 2024-01-10 PROCEDURE — 85652 RBC SED RATE AUTOMATED: CPT | Performed by: INTERNAL MEDICINE

## 2024-01-10 PROCEDURE — 85730 THROMBOPLASTIN TIME PARTIAL: CPT | Performed by: STUDENT IN AN ORGANIZED HEALTH CARE EDUCATION/TRAINING PROGRAM

## 2024-01-10 PROCEDURE — 97167 OT EVAL HIGH COMPLEX 60 MIN: CPT

## 2024-01-10 PROCEDURE — 97163 PT EVAL HIGH COMPLEX 45 MIN: CPT

## 2024-01-10 PROCEDURE — 99223 1ST HOSP IP/OBS HIGH 75: CPT | Performed by: INTERNAL MEDICINE

## 2024-01-10 PROCEDURE — 85610 PROTHROMBIN TIME: CPT | Performed by: STUDENT IN AN ORGANIZED HEALTH CARE EDUCATION/TRAINING PROGRAM

## 2024-01-10 PROCEDURE — 94664 DEMO&/EVAL PT USE INHALER: CPT

## 2024-01-10 RX ORDER — FORMOTEROL FUMARATE DIHYDRATE 20 UG/2ML
20 SOLUTION RESPIRATORY (INHALATION)
Status: DISCONTINUED | OUTPATIENT
Start: 2024-01-10 | End: 2024-01-10

## 2024-01-10 RX ORDER — ECHINACEA PURPUREA EXTRACT 125 MG
2 TABLET ORAL
Status: DISCONTINUED | OUTPATIENT
Start: 2024-01-10 | End: 2024-01-13 | Stop reason: HOSPADM

## 2024-01-10 RX ORDER — ALBUTEROL SULFATE 2.5 MG/3ML
SOLUTION RESPIRATORY (INHALATION)
Status: COMPLETED
Start: 2024-01-10 | End: 2024-01-10

## 2024-01-10 RX ORDER — FORMOTEROL FUMARATE DIHYDRATE 20 UG/2ML
20 SOLUTION RESPIRATORY (INHALATION)
Status: DISCONTINUED | OUTPATIENT
Start: 2024-01-10 | End: 2024-01-13 | Stop reason: HOSPADM

## 2024-01-10 RX ORDER — ALBUTEROL SULFATE 2.5 MG/3ML
2.5 SOLUTION RESPIRATORY (INHALATION) EVERY 6 HOURS PRN
Status: DISCONTINUED | OUTPATIENT
Start: 2024-01-10 | End: 2024-01-13 | Stop reason: HOSPADM

## 2024-01-10 RX ADMIN — HEPARIN SODIUM 1200 UNITS: 1000 INJECTION INTRAVENOUS; SUBCUTANEOUS at 10:27

## 2024-01-10 RX ADMIN — ACETAMINOPHEN 650 MG: 325 TABLET, FILM COATED ORAL at 10:31

## 2024-01-10 RX ADMIN — IPRATROPIUM BROMIDE 0.5 MG: 0.5 SOLUTION RESPIRATORY (INHALATION) at 07:48

## 2024-01-10 RX ADMIN — BUDESONIDE INHALATION 0.5 MG: 0.5 SUSPENSION RESPIRATORY (INHALATION) at 07:48

## 2024-01-10 RX ADMIN — LEVALBUTEROL HYDROCHLORIDE 1.25 MG: 1.25 SOLUTION RESPIRATORY (INHALATION) at 20:23

## 2024-01-10 RX ADMIN — DEXAMETHASONE SODIUM PHOSPHATE 6 MG: 10 INJECTION, SOLUTION INTRAMUSCULAR; INTRAVENOUS at 03:05

## 2024-01-10 RX ADMIN — LEVALBUTEROL HYDROCHLORIDE 1.25 MG: 1.25 SOLUTION RESPIRATORY (INHALATION) at 14:31

## 2024-01-10 RX ADMIN — REMDESIVIR 100 MG: 100 INJECTION, POWDER, LYOPHILIZED, FOR SOLUTION INTRAVENOUS at 03:05

## 2024-01-10 RX ADMIN — LEVALBUTEROL HYDROCHLORIDE 1.25 MG: 1.25 SOLUTION RESPIRATORY (INHALATION) at 07:48

## 2024-01-10 RX ADMIN — IPRATROPIUM BROMIDE 0.5 MG: 0.5 SOLUTION RESPIRATORY (INHALATION) at 20:23

## 2024-01-10 RX ADMIN — FORMOTEROL FUMARATE DIHYDRATE 20 MCG: 20 SOLUTION RESPIRATORY (INHALATION) at 20:23

## 2024-01-10 RX ADMIN — HEPARIN SODIUM 18 UNITS/KG/HR: 10000 INJECTION, SOLUTION INTRAVENOUS at 14:00

## 2024-01-10 RX ADMIN — WARFARIN SODIUM 5 MG: 5 TABLET ORAL at 17:58

## 2024-01-10 RX ADMIN — ALBUTEROL SULFATE 2 PUFF: 90 AEROSOL, METERED RESPIRATORY (INHALATION) at 05:02

## 2024-01-10 RX ADMIN — ONDANSETRON 4 MG: 2 INJECTION INTRAMUSCULAR; INTRAVENOUS at 04:58

## 2024-01-10 RX ADMIN — ALBUTEROL SULFATE 2 PUFF: 90 AEROSOL, METERED RESPIRATORY (INHALATION) at 10:28

## 2024-01-10 RX ADMIN — BUDESONIDE INHALATION 0.5 MG: 0.5 SUSPENSION RESPIRATORY (INHALATION) at 20:23

## 2024-01-10 RX ADMIN — IPRATROPIUM BROMIDE 0.5 MG: 0.5 SOLUTION RESPIRATORY (INHALATION) at 14:31

## 2024-01-10 RX ADMIN — ALBUTEROL SULFATE 2.5 MG: 2.5 SOLUTION RESPIRATORY (INHALATION) at 05:10

## 2024-01-10 NOTE — PROGRESS NOTES
[facility]  Progress Note  Name: Loretta Partida I  MRN: [id number]  Unit/Bed#: -01 I Date of Admission: 1/5/2024   Date of Service: 1/10/2024 I Hospital Day: 5    Assessment/Plan   * Acute respiratory failure with hypoxia (HCC)  Assessment & Plan  Acute respiratory failure with hypoxia secondary to COVID-19 infection superimposed on baseline COPD  Currently over saturation 91 to 93% on 6L nasal cannula.  Continue Pulmicort, Xopenex, added perforomist  Continue IV Decadron.  Continue to monitor and titrate as needed to maintain oxygen saturation greater than 88%  See treatment plan below for COVID  Pulmonology recommendation appreciated.    Hypomagnesemia  Assessment & Plan  Continue supplement electrolytes as needed.    Positive blood culture  Assessment & Plan  1 out of 2 blood cultures on admission growing Micrococcus luteus  This appears consistent with contamination  Will discontinue vancomycin and monitor off antibiotic therapy  Follow-up repeat cultures    Severe protein-calorie malnutrition (HCC)  Assessment & Plan  Malnutrition Findings:   Adult Malnutrition type: Chronic illness  Adult Degree of Malnutrition: Other severe protein calorie malnutrition  Malnutrition Characteristics: Weight loss, Inadequate energy                  360 Statement: Related to chronic illness (COPD, MS) as evidenced by consuming < 75% of energy energy requirement for > 1 month and reported 24% weight loss over the past year. Treated with diet and supplements.    BMI Findings:  Adult BMI Classifications: Underweight < 18.5        Body mass index is 13.55 kg/m².       Hyponatremia  Assessment & Plan  Severe hyponatremia on admission at 117; probably secondary to SIADH  Now improving with fluid restriction  Currently sodium 132.  Continue to monitor daily BMP  Nephrology recommendation appreciated.    Thoracic outlet syndrome  Assessment & Plan  INR subtherapeutic on 1/8; will initiate heparin drip  Monitor  PTT per protocol  Continue Coumadin 5 mg daily with daily INR checks    Multiple sclerosis ([facility])  Assessment & Plan  The patient is on some pain medications and Valium as needed    COPD (chronic obstructive pulmonary disease) ([facility])  Assessment & Plan  Continue home inhaler regimen  Very poor air movement, suspect severe underlying disease  Continue Pulmicort, Xopenex, added Perforomist, continue IV Decadron.  Pulmonary recommendation appreciated.      COVID-19  Assessment & Plan  Patient presented with progressive shortness of breath and dyspnea and found to be positive for COVID in the emergency room  This is superimposed on baseline COPD, active smoking    Continue IV remdesivir x 5 days  IV Decadron 6 mg daily x 10 days  CRP < 1: will dc baricitinib.  Wean O2 as tolerated  Trend inflammatory markers.  Patient is currently on Coumadin currently subtherapeutic.  Currently patient is on IV heparin drip for bridging to Coumadin.  Procalcitonin negative x 2; continue to monitor off antibiotics  Trend inflammatory markers               VTE Pharmacologic Prophylaxis: VTE Score: 5 Moderate Risk (Score 3-4) - Pharmacological DVT Prophylaxis Ordered: heparin drip.    Mobility:   Basic Mobility Inpatient Raw Score: 17  -[facility] Goal: 5: Stand one or more mins  -[facility] Achieved: 7: Walk 25 feet or more      Patient Centered Rounds: I performed bedside rounds with nursing staff today.   Discussions with Specialists or Other Care Team Provider: Pulmonology    Education and Discussions with Family / Patient: called  Artemio around 1:10 pm. however did not answer.    Total Time Spent on Date of Encounter in care of patient: 25 mins. This time was spent on one or more of the following: performing physical exam; counseling and coordination of care; obtaining or reviewing history; documenting in the medical record; reviewing/ordering tests, medications or procedures; communicating with other healthcare professionals and  discussing with patient's family/caregivers.    Current Length of Stay: 5 day(s)  Current Patient Status: Inpatient   Certification Statement: The patient will continue to require additional inpatient hospital stay due to hypoxia  Discharge Plan: Anticipate discharge in 48-72 hrs to home.    Code Status: Level 1 - Full Code    Subjective:     Seen during a.m. rounds.  Patient appears comfortable and not in distress.  O2 saturation noted around 90 to 92% on 6 L nasal cannula at rest.  Currently she denies any complaints and upset about being in the hospital.  Patient is adamantly demanding to go home and being unreasonable.     objective:     Vitals:   Temp (24hrs), Av.3 °F (36.8 °C), Min:97.9 °F (36.6 °C), Max:98.6 °F (37 °C)    Temp:  [97.9 °F (36.6 °C)-98.6 °F (37 °C)] 98.6 °F (37 °C)  HR:  [] 103  BP: (129-131)/(80-82) 131/80  SpO2:  [84 %-96 %] 91 %  Body mass index is 13.55 kg/m².     Input and Output Summary (last 24 hours):     Intake/Output Summary (Last 24 hours) at 1/10/2024 1610  Last data filed at 1/10/2024 1300  Gross per 24 hour   Intake 700 ml   Output 1850 ml   Net -1150 ml       Physical Exam:   Physical Exam  Constitutional:       Comments: Elderly female patient, appears older than stated age, cachectic, chronically ill and deconditioned, acutely nontoxic appearing.   HENT:      Head: Normocephalic and atraumatic.   Eyes:      Pupils: Pupils are equal, round, and reactive to light.   Cardiovascular:      Rate and Rhythm: Normal rate.      Pulses: Normal pulses.   Pulmonary:      Effort: Pulmonary effort is normal. No respiratory distress.      Comments: Decreased lung sounds however patient is not in acute respiratory distress.  Able to speak in full sentences.  O2 saturation 91 to 93% on 6 L nasal cannula.  Abdominal:      General: Bowel sounds are normal. There is no distension.      Palpations: Abdomen is soft.      Tenderness: There is no abdominal tenderness.   Musculoskeletal:       Cervical back: No rigidity.      Right lower leg: No edema.      Left lower leg: No edema.   Neurological:      Mental Status: She is alert and oriented to person, place, and time. Mental status is at baseline.   Psychiatric:         Mood and Affect: Mood normal.         Behavior: Behavior normal.          Additional Data:     Labs:  Results from last 7 days   Lab Units 01/10/24  0452 01/06/24  0437 01/05/24  1820   WBC Thousand/uL 8.14   < > 4.76   HEMOGLOBIN g/dL 13.6   < > 14.8   HEMATOCRIT % 38.9   < > 43.3   PLATELETS Thousands/uL 220   < > 161   NEUTROS PCT %  --   --  70   LYMPHS PCT %  --   --  17   MONOS PCT %  --   --  13*   EOS PCT %  --   --  0    < > = values in this interval not displayed.     Results from last 7 days   Lab Units 01/10/24  0452 01/06/24 0437 01/05/24  1902   SODIUM mmol/L 131*   < > 117*   POTASSIUM mmol/L 3.4*   < > 4.8   CHLORIDE mmol/L 88*   < > 77*   CO2 mmol/L 37*   < > 33*   BUN mg/dL 9   < > 14   CREATININE mg/dL 0.26*   < > 0.36*   ANION GAP mmol/L 6   < > 7   CALCIUM mg/dL 8.4   < > 8.1*   ALBUMIN g/dL  --   --  4.0   TOTAL BILIRUBIN mg/dL  --   --  0.39   ALK PHOS U/L  --   --  50   ALT U/L  --   --  25   AST U/L  --   --  53*   GLUCOSE RANDOM mg/dL 112   < > 117    < > = values in this interval not displayed.     Results from last 7 days   Lab Units 01/10/24  0921   INR  1.73*             Results from last 7 days   Lab Units 01/07/24  0546 01/06/24  0437 01/05/24  1820   LACTIC ACID mmol/L  --   --  1.2   PROCALCITONIN ng/ml 0.05 0.13  --        Lines/Drains:  Invasive Devices       Peripheral Intravenous Line  Duration             Peripheral IV 01/08/24 Left;Ventral (anterior) Forearm 2 days    Peripheral IV 01/09/24 Right;Ventral (anterior) Forearm <1 day                      Recent Cultures (last 7 days):   Results from last 7 days   Lab Units 01/07/24  1415 01/07/24  1411 01/05/24  1824 01/05/24  1820   BLOOD CULTURE  No Growth at 48 hrs. No Growth at 48 hrs.  Micrococcus luteus* No Growth After 4 Days.   GRAM STAIN RESULT   --   --  Gram positive cocci in clusters*  --        Last 24 Hours Medication List:   Current Facility-Administered Medications   Medication Dose Route Frequency Provider Last Rate    acetaminophen  650 mg Oral Q6H PRN Mathew Rust MD      albuterol  2 puff Inhalation Q4H PRN Mathew Rust MD      albuterol  2.5 mg Nebulization Q6H PRN Xander MICHELE MD      budesonide  0.5 mg Nebulization Q12H Bautista Haider DO      dexamethasone  6 mg Intravenous Q24H Mathew Rust MD      formoterol  20 mcg Nebulization Q12H Lisset Sanchez MD      heparin (porcine)  3-20 Units/kg/hr (Order-Specific) Intravenous Titrated Bautista Haider DO 18 Units/kg/hr (01/10/24 1400)    heparin (porcine)  1,200 Units Intravenous Q6H PRN Bautista Haider DO      heparin (porcine)  2,400 Units Intravenous Q6H PRN Bautista Haider DO      ipratropium  0.5 mg Nebulization TID Bautista Haider DO      levalbuterol  1.25 mg Nebulization TID Bautista Haider DO      nicotine  1 patch Transdermal Daily Mathew Rust MD      ondansetron  4 mg Intravenous Q6H PRN Mathew Rust MD      sodium chloride  2 spray Each Nare Q1H PRN Nilda Harris PA-C      warfarin  5 mg Oral Daily (warfarin) Bautista Haider DO          Today, Patient Was Seen By: Xander Palafox MD    **Please Note: This note may have been constructed using a voice recognition system.**

## 2024-01-10 NOTE — NURSING NOTE
"Pt with blanchable redness on sacrum and spine. Pt educated on the importance of turning in order to prevent pressure injuries, however, pt refused stating, \"Yeah that won't happen\". Allevyn on sacrum and spine intact.   "

## 2024-01-10 NOTE — NURSING NOTE
Patient currently on 5L-6l acutely during this hospital admission; SPO2 92% prior to evaluation at rest on 5L via nc. Supplemental  oxygen removed while patient at rest. SPO2 dropped down to 84% on RA. Supplemental oxygen provided at 6L via nasal cannula once again. SPO2 gradually increasing to 90% over a 2 minute span.

## 2024-01-10 NOTE — ASSESSMENT & PLAN NOTE
Acute respiratory failure with hypoxia secondary to COVID-19 infection superimposed on baseline COPD  Currently over saturation 91 to 93% on 6L nasal cannula.  Continue Pulmicort, Xopenex, added perforomist  Continue IV Decadron.  Continue to monitor and titrate as needed to maintain oxygen saturation greater than 88%  See treatment plan below for COVID  Pulmonology recommendation appreciated.

## 2024-01-10 NOTE — CASE MANAGEMENT
Case Management Discharge Planning Note    Patient name Loretta Partida  Location /-01 MRN [id number]  : 1965 Date 1/10/2024       Current Admission Date: 2024  Current Admission Diagnosis:Acute respiratory failure with hypoxia (HCC)   Patient Active Problem List    Diagnosis Date Noted    Severe protein-calorie malnutrition (HCC) 2024    Positive blood culture 2024    Hypomagnesemia 2024    Acute respiratory failure with hypoxia (HCC) 2024    COVID-19 2024    COPD (chronic obstructive pulmonary disease) (HCC) 2024    Multiple sclerosis (HCC) 2024    Thoracic outlet syndrome 2024    Hyponatremia 2024      LOS (days): 5  Geometric Mean LOS (GMLOS) (days):   Days to GMLOS:     OBJECTIVE:  Risk of Unplanned Readmission Score: 13.05         Current admission status: Inpatient   Preferred Pharmacy:   [facility]/pharmacy #[id number] - [address] [address]  Phone: [phone] Fax: [phone]    Primary Care Provider: Jordi Morse MD    Primary Insurance: BLUE CROSS  Secondary Insurance:     DISCHARGE DETAILS:     DME Referral Provided  Referral made for DME?: Yes  DME referral completed for the following items:: Nebulizer (CM sent for neb at request of slim and nursing.)  DME Supplier Name:: [employer]    Other Referral/Resources/Interventions Provided:  Interventions: DME  Referral Comments: Nebulizer referral sent

## 2024-01-10 NOTE — CONSULTS
Consultation - Pulmonary Medicine   Loretta Partida 58 y.o. female MRN: [id number]  Unit/Bed#: -01 Encounter: [id number]      Assessment:  Acute hypoxemic respiratory failure  COPD unknown severity with acute exacerbation  Emphysema pulmonary  COVID-positive infection  Active tobacco use    Plan:   Acute hypoxemic respiratory failure she did require 10 to 12 L at the time of admission slowly her requirement has come down currently on 5 L currently saturating around 92%.  Reviewed chest x-ray with significant emphysematous changes with hyperinflation no evidence of any pneumonia  COPD unknown severity with acute exacerbation and I believe she at baseline she probably might be requiring oxygen which she did not seek any medical attention for years, given the emphysematous changes and her chronic dyspnea   Continue with Xopenex and Atrovent around-the-clock  On budesonide to continue will add Perforomist she does use Advair Spiriva and albuterol MDI at home  On the Decadron for the COVID protocol given her very low BMI even the 6 mg seems to be equal into the higher dose regimen  Will leave her on the 6 mg of Decadron currently  She has history of anxiety also and increasing steroids might also increase her anxiety as well  Echocardiogram to look for pulmonary hypertension  Also ordered for inflammatory markers if there is any elevation in D-dimer we can get a CT angiogram otherwise no very low clinical suspicion for PE  Candidate for CT lung screening outpatient  Patient keeps saying she wants to go home today and without oxygen I discussed with her that it is not safe to go home without the oxygen currently requiring around 5 L her   at bedside spoke to  her and she did agree to stay back at least until tomorrow she states and also discussed that she would end up going home on oxygen she didn't say no to it, says she just wants to go home  Discussed with Slim attending  Will continue to follow   History of  Present Illness   Physician Requesting Consult: Xander MICHELE MD  Reason for Consult / Principal Problem: Hypoxemic respiratory failure  Hx and PE limited by: None  HPI: Loretta Partida is a 58 y.o. year old female who presents with significant smoking history is smoking about a pack a day at least until recently about 3 days ago, states she was diagnosed with asthma/COPD several years ago she has not followed up with any pulmonologist in several years now but she continues to take her Advair and Spiriva she states and her albuterol MDI as needed following up with her primary care, did not have any flareups of COPD requiring any hospitalizations in the past she states at baseline she is short of breath walking around in the house doing chores slowly the shortness of breath and dyspnea exertion has been getting worse for slowly for several years 2 days ago she states she started to have more shortness of breath and just could not breathe even walking up to the restroom and come back in the house, for which she asked her  to bring her into the ER.  She did have a sick contact she states her  was sick last week although she does not complain of any fevers or cough she did test positive for COVID and COVID protocol has been started and is currently on remdesivir as well as Decadron and she was also started on back to Barticinib which was later DC'd because of normal inflammatory markers  Pulmonary has been consulted given still requiring around 5 L.    Inpatient consult to Pulmonology  Consult performed by: Lisset Sanchez MD  Consult ordered by: Xander MICHELE MD          Review of Systems   Constitutional:  Positive for fatigue.   HENT: Negative.     Eyes: Negative.    Respiratory:  Positive for shortness of breath.    Cardiovascular: Negative.    Gastrointestinal: Negative.    Endocrine: Negative.    Genitourinary: Negative.    Musculoskeletal: Negative.    Allergic/Immunologic: Negative.     Neurological: Negative.    Hematological: Negative.    Psychiatric/Behavioral: Negative.         Historical Information   Past Medical History:   Diagnosis Date    COPD (chronic obstructive pulmonary disease) ([facility]) 1/5/2024    Multiple sclerosis (HCC) 1/5/2024    Osteoporosis     Thoracic outlet syndrome 1/5/2024     Past Surgical History:   Procedure Laterality Date    THORACIC OUTLET SURGERY       Social History   Social History     Substance and Sexual Activity   Alcohol Use Not Currently     Social History     Substance and Sexual Activity   Drug Use Never     E-Cigarette/Vaping     E-Cigarette/Vaping Substances     Social History     Tobacco Use   Smoking Status Every Day    Current packs/day: 0.50    Average packs/day: 0.5 packs/day for 30.0 years (15.0 ttl pk-yrs)    Types: Cigarettes    Start date: 1/5/1994   Smokeless Tobacco Never         Family History: non-contributory    Meds/Allergies   current meds:   Current Facility-Administered Medications   Medication Dose Route Frequency    acetaminophen (TYLENOL) tablet 650 mg  650 mg Oral Q6H PRN    albuterol (PROVENTIL HFA,VENTOLIN HFA) inhaler 2 puff  2 puff Inhalation Q4H PRN    albuterol inhalation solution 2.5 mg  2.5 mg Nebulization Q6H PRN    budesonide (PULMICORT) inhalation solution 0.5 mg  0.5 mg Nebulization Q12H    dexamethasone (PF) (DECADRON) injection 6 mg  6 mg Intravenous Q24H    formoterol (PERFOROMIST) nebulizer solution 20 mcg  20 mcg Nebulization Q12H    heparin (porcine) 25,000 units in 0.45% NaCl 250 mL infusion (premix)  3-20 Units/kg/hr (Order-Specific) Intravenous Titrated    heparin (porcine) injection 1,200 Units  1,200 Units Intravenous Q6H PRN    heparin (porcine) injection 2,400 Units  2,400 Units Intravenous Q6H PRN    ipratropium (ATROVENT) 0.02 % inhalation solution 0.5 mg  0.5 mg Nebulization TID    levalbuterol (XOPENEX) inhalation solution 1.25 mg  1.25 mg Nebulization TID    nicotine (NICODERM CQ) 21 mg/24 hr TD 24 hr  "patch 1 patch  1 patch Transdermal Daily    ondansetron (ZOFRAN) injection 4 mg  4 mg Intravenous Q6H PRN    sodium chloride (OCEAN) 0.65 % nasal spray 2 spray  2 spray Each Nare Q1H PRN    warfarin (COUMADIN) tablet 5 mg  5 mg Oral Daily (warfarin)       No Known Allergies    Objective   Vitals: Blood pressure 131/80, pulse 103, temperature 98.6 °F (37 °C), resp. rate 20, height 5' 3\" (1.6 m), weight 34.7 kg (76 lb 8 oz), SpO2 91%.,Body mass index is 13.55 kg/m².    Intake/Output Summary (Last 24 hours) at 1/10/2024 1548  Last data filed at 1/10/2024 1300  Gross per 24 hour   Intake 700 ml   Output 1850 ml   Net -1150 ml     Invasive Devices       Peripheral Intravenous Line  Duration             Peripheral IV 01/08/24 Left;Ventral (anterior) Forearm 2 days    Peripheral IV 01/09/24 Right;Ventral (anterior) Forearm <1 day                    Physical Exam  Constitutional:       Comments: cachectic     Currently sitting up on the bed in no acute respiratory distress  Eyes anicteric Sleeter, conjunctiva is clear  ENT nares is patent, no evidence of any discharge  Neck no JVD no cervical lymphadenopathy  Chest evidence of kyphosis,  Lungs diminished breath sounds bilaterally no rhonchi  Heart first and second heart sounds are heard no murmur or gallop is heard  Abdomen soft nontender bowel sounds are present  Extremities no pedal edema  Skin no rash no bruises  CNS awake alert oriented x 3      Lab Results: CBC:   Lab Results   Component Value Date    WBC 8.14 01/10/2024    HGB 13.6 01/10/2024    HCT 38.9 01/10/2024    MCV 96 01/10/2024     01/10/2024    RBC 4.04 01/10/2024    MCH 33.7 01/10/2024    MCHC 35.0 01/10/2024    RDW 12.0 01/10/2024    MPV 9.1 01/10/2024     Imaging Studies: I have personally reviewed pertinent films in PACS  EKG, Pathology, and Other Studies: I have personally reviewed pertinent reports.    VTE Prophylaxis: Enoxaparin (Lovenox)    Code Status: Level 1 - Full Code  Advance Directive " and Living Will:      Power of :    POLST:

## 2024-01-10 NOTE — ASSESSMENT & PLAN NOTE
Patient presented with progressive shortness of breath and dyspnea and found to be positive for COVID in the emergency room  This is superimposed on baseline COPD, active smoking    Continue IV remdesivir x 5 days  IV Decadron 6 mg daily x 10 days  CRP < 1: will dc baricitinib.  Wean O2 as tolerated  Trend inflammatory markers.  Patient is currently on Coumadin currently subtherapeutic.  Currently patient is on IV heparin drip for bridging to Coumadin.  Procalcitonin negative x 2; continue to monitor off antibiotics  Trend inflammatory markers

## 2024-01-10 NOTE — RESPIRATORY THERAPY NOTE
RT Protocol Note  Loretta Partida 58 y.o. female MRN: [id number]  Unit/Bed#: -01 Encounter: [id number]    Assessment    Principal Problem:    Acute respiratory failure with hypoxia (HCC)  Active Problems:    COVID-19    COPD (chronic obstructive pulmonary disease) (HCC)    Multiple sclerosis (HCC)    Thoracic outlet syndrome    Hyponatremia    Severe protein-calorie malnutrition (HCC)    Positive blood culture    Hypomagnesemia      Home Pulmonary Medications:         Past Medical History:   Diagnosis Date    COPD (chronic obstructive pulmonary disease) (HCC) 1/5/2024    Multiple sclerosis (HCC) 1/5/2024    Osteoporosis     Thoracic outlet syndrome 1/5/2024     Social History     Socioeconomic History    Marital status: /Civil Union     Spouse name: None    Number of children: None    Years of education: None    Highest education level: None   Occupational History    None   Tobacco Use    Smoking status: Every Day     Current packs/day: 0.50     Average packs/day: 0.5 packs/day for 30.0 years (15.0 ttl pk-yrs)     Types: Cigarettes     Start date: 1/5/1994    Smokeless tobacco: Never   Substance and Sexual Activity    Alcohol use: Not Currently    Drug use: Never    Sexual activity: None   Other Topics Concern    None   Social History Narrative    None     Social Determinants of Health     Financial Resource Strain: Not on file   Food Insecurity: No Food Insecurity (1/9/2024)    Hunger Vital Sign     Worried About Running Out of Food in the Last Year: Never true     Ran Out of Food in the Last Year: Never true   Transportation Needs: No Transportation Needs (1/9/2024)    PRAPARE - Transportation     Lack of Transportation (Medical): No     Lack of Transportation (Non-Medical): No   Physical Activity: Not on file   Stress: Not on file   Social Connections: Not on file   Intimate Partner Violence: Not on file   Housing Stability: Low Risk  (1/9/2024)    Housing Stability Vital Sign     Unable to Pay for  [Patient reported PAP Smear was normal] : Patient reported PAP Smear was normal "Housing in the Last Year: No     Number of Places Lived in the Last Year: 1     Unstable Housing in the Last Year: No       Subjective         Objective    Physical Exam:   Assessment Type: Pre-treatment  General Appearance: Awake, Alert  Respiratory Pattern: Normal  Chest Assessment: Chest expansion symmetrical  Bilateral Breath Sounds: Diminished  O2 Device: NC    Vitals:  Blood pressure 129/82, pulse 103, temperature 97.9 °F (36.6 °C), resp. rate 20, height 5' 3\" (1.6 m), weight 34.7 kg (76 lb 8 oz), SpO2 (!) 86%.        O2 Device: NC     Plan    Respiratory Plan: Mild Distress pathway        Resp Comments: Continue ACT as ordered for COPD maintenance   " [N] : Patient does not use contraception [Y] : Positive pregnancy history [Menarche Age: ____] : age at menarche was [unfilled] [HCG+: ___(Date)] : a positive HCG was recorded on [unfilled] [No] : Patient does not have concerns regarding sex [Currently Active] : currently active [Men] : men [Vaginal] : vaginal [PapSmeardate] : 01/18 [MensesFreq] : 28 [LMPDate] : 01/12/23 [MensesLength] : 6 [PGHxTotal] : 2 [[facility] Hospital[facility]] : 1 [FreeTextEntry1] : 01/12/23

## 2024-01-10 NOTE — CASE MANAGEMENT
Case Management Discharge Planning Note    Patient name Loretta Partida  Location /-01 MRN [id number]  : 1965 Date 1/10/2024       Current Admission Date: 2024  Current Admission Diagnosis:Acute respiratory failure with hypoxia (HCC)   Patient Active Problem List    Diagnosis Date Noted    Severe protein-calorie malnutrition (HCC) 2024    Positive blood culture 2024    Hypomagnesemia 2024    Acute respiratory failure with hypoxia (HCC) 2024    COVID-19 2024    COPD (chronic obstructive pulmonary disease) (HCC) 2024    Multiple sclerosis (HCC) 2024    Thoracic outlet syndrome 2024    Hyponatremia 2024      LOS (days): 5  Geometric Mean LOS (GMLOS) (days):   Days to GMLOS:     OBJECTIVE:  Risk of Unplanned Readmission Score: 13.05         Current admission status: Inpatient   Preferred Pharmacy:   [facility]/pharmacy #[id number] - [address] [address]  Phone: [phone] Fax: [phone]    Primary Care Provider: Jordi Morse MD    Primary Insurance: BLUE CROSS  Secondary Insurance:     DISCHARGE DETAILS:     CM spoke with patient via room phone on pt/ot recommendation. Patient denied rehab and hhc but stated OP PT/OT may be an option. CM asked SLIM for OP orders.

## 2024-01-10 NOTE — ASSESSMENT & PLAN NOTE
Malnutrition Findings:   Adult Malnutrition type: Chronic illness  Adult Degree of Malnutrition: Other severe protein calorie malnutrition  Malnutrition Characteristics: Weight loss, Inadequate energy                  360 Statement: Related to chronic illness (COPD, MS) as evidenced by consuming < 75% of energy energy requirement for > 1 month and reported 24% weight loss over the past year. Treated with diet and supplements.    BMI Findings:  Adult BMI Classifications: Underweight < 18.5        Body mass index is 13.55 kg/m².

## 2024-01-10 NOTE — PHYSICAL THERAPY NOTE
Physical Therapy Evaluation     Patient's Name: Loretta Partida    Admitting Diagnosis  Hyponatremia [E87.1]  SOB (shortness of breath) [R06.02]  COVID-19 [U07.1]    Problem List  Patient Active Problem List   Diagnosis    COVID-19    COPD (chronic obstructive pulmonary disease) (HCC)    Multiple sclerosis (HCC)    Thoracic outlet syndrome    Hyponatremia    Acute respiratory failure with hypoxia (HCC)    Severe protein-calorie malnutrition (HCC)    Positive blood culture    Hypomagnesemia     Past Medical History  Past Medical History:   Diagnosis Date    COPD (chronic obstructive pulmonary disease) (HCC) 1/5/2024    Multiple sclerosis (HCC) 1/5/2024    Osteoporosis     Thoracic outlet syndrome 1/5/2024     Past Surgical History  Past Surgical History:   Procedure Laterality Date    THORACIC OUTLET SURGERY        01/10/24 0910   PT Last Visit   PT Visit Date 01/10/24   Note Type   Note type Evaluation   Pain Assessment   Pain Assessment Tool 0-10   Pain Score   (no numeric number given)   Pain Location/Orientation Location: Generalized   Pain Onset/Description Onset: Ongoing   Hospital Pain Intervention(s) Repositioned;Ambulation/increased activity   Pain Rating: FLACC (Rest) - Face 0   Pain Rating: FLACC (Rest) - Legs 0   Pain Rating: FLACC (Rest) - Activity 0   Pain Rating: FLACC (Rest) - Cry 0   Pain Rating: FLACC (Rest) - Consolability 0   Score: FLACC (Rest) 0   Pain Rating: FLACC (Activity) - Face 1   Pain Rating: FLACC (Activity) - Legs 1   Pain Rating: FLACC (Activity) - Activity 1   Pain Rating: FLACC (Activity) - Cry 1   Pain Rating: FLACC (Activity) - Consolability 1   Score: FLACC (Activity) 5   Restrictions/Precautions   Weight Bearing Precautions Per Order No   Braces or Orthoses Other (Comment)  (history of back brace-wears PRN)   Other Precautions Contact/isolation;Airborne/isolation;Droplet precautions;Chair Alarm;Bed Alarm;O2;Fall Risk;Pain  (+COVID; +5L O2 via nC)   Home Living   Type of Home  "House   Home Layout One level;Able to live on main level with bedroom/bathroom;Performs ADLs on one level  (No RYAN)   Bathroom Shower/Tub Tub/shower unit   Bathroom Toilet Raised   Bathroom Equipment Shower chair   Bathroom Accessibility Accessible   Home Equipment Walker;Cane;Wheelchair-manual  (rollator; transport chair)   Additional Comments Pt ambulates without an AD.   Prior Function   Level of [place] Independent with functional mobility;Independent with ADLs;Independent with IADLS   Lives With Spouse   Receives Help From Family   IADLs Independent with meal prep;Independent with medication management;Family/Friend/Other provides transportation   Falls in the last 6 months 0   General   Family/Caregiver Present No   Cognition   Overall Cognitive Status WFL   Arousal/Participation Alert   Attention Within functional limits   Orientation Level Oriented X4   Memory Within functional limits   Following Commands Follows all commands and directions without difficulty   Comments Pt agreeable to PT.   Subjective   Subjective \"I want to go home today.\"   RLE Assessment   RLE Assessment X   Strength RLE   RLE Overall Strength 4-/5  (grossly assessed)   LLE Assessment   LLE Assessment X   Strength LLE   LLE Overall Strength 4-/5  (grossly assessed)   Light Touch   RLE Light Touch Grossly intact   LLE Light Touch Grossly intact   Bed Mobility   Supine to Sit 5  Supervision   Additional items Assist x 1;HOB elevated;Bedrails;Increased time required;Verbal cues   Transfers   Sit to Stand 4  Minimal assistance  (CG assist)   Additional items Assist x 1;Increased time required;Verbal cues   Stand to Sit 4  Minimal assistance  (CG assist)   Additional items Assist x 1;Increased time required;Verbal cues   Ambulation/Elevation   Gait pattern Decreased toe off;Decreased heel strike;Decreased hip extension;Short stride;Shuffling   Gait Assistance 4  Minimal assist   Additional items Assist x 1;Verbal cues   Assistive Device " Other (Comment)  (right hand held assist)   Distance 25 feet   Balance   Static Sitting Fair +   Dynamic Sitting Fair   Static Standing Fair -   Dynamic Standing Poor +   Ambulatory Poor +   Endurance Deficit   Endurance Deficit Yes   Endurance Deficit Description decreased activity tolerance; pt requiring supplemental oxygen; pt was received on 5L O2 via NC; SpO2 decreased to 80% with functional mobility;  increasd to 85% within 1 minute; increased to 90% with additional seated rest break and verbal cues for proper breathing techniques; RN made aware   Activity Tolerance   Activity Tolerance Patient limited by fatigue   Medical Staff Made Aware OT Carmen  (Co-evaluation performed with OT secondary to complex medical condition of patient and regression of functional status from baseline. PT/OT goals were addressed separately.)   Nurse Made Aware MAGAN Tanner   Assessment   Prognosis Good   Problem List Decreased strength;Decreased endurance;Impaired balance;Decreased mobility;Pain   Assessment Pt is 58 year old female seen for PT evaluation s/p admit to [facility] on 1/5/2024 with Acute respiratory failure with hypoxia (HCC). PT consulted to assess pt's functional mobility and discharge needs. Order placed for PT evaluation and treatment, with activity as tolerated order. Comorbidities affecting pt's physical performance at time of assessment include COVID-19, COPD, multiple sclerosis, thoracic outlet syndrome, hyponatremia, severe protein calorie malnutrition, positive blood culture, and hypomagnesemia. Prior to hospitalization, pt was independent with all functional mobility without an AD. Pt ambulates unrestricted distances on all terrain and elevations. Pt resides with her spouse, in a one level house with no steps to enter. Personal factors affecting pt at time of initial evaluation include difficulty ambulating household distances, inability to ambulate community distances, inability to navigate level  surfaces without external assistance, unable to perform dynamic tasks in the community, difficulty performing ADLs, and inability to perform IADLs. Please find objective findings from PT assessment regarding body systems outlined above with impairments and limitations including weakness, impaired balance, decreased endurance, gait deviations, pain, decreased activity tolerance, decreased functional mobility tolerance, fall risk, and SOB upon exertion. The following objective measures were performed on initial evaluation Barthel Index: 55/100, Modified [place]: 4 (moderate/severe disability), and AM-PAC 6-Clicks: 17/24. Pt's clinical presentation is currently unstable/unpredictable seen in pt's presentation of need for ongoing medical management/monitoring, pt is a fall risk, pt is currently requiring supplemental oxygen, and pt requires cues and assist for safety with functional mobility. Pt to benefit from continued PT treatment to address deficits as defined above and maximize pt's level of function and independence with mobility. From a PT standpoint, recommendation at time of discharge would be level 2, moderate resource intensity in order to facilitate return to prior level of function.   Barriers to Discharge Other (Comment)  (decline in functional mobility)   Goals   Patient Goals to go home   STG Expiration Date 01/20/24   Short Term Goal #1 In 10 days: Increase bilateral LE strength 1/2 grade to facilitate independent mobility, Perform all bed mobility tasks modified independent to decrease caregiver burden, Perform all transfers modified independent to improve independence, Ambulate > 150 ft. with least restrictive assistive device modified independent w/o LOB and w/ normalized gait pattern 100% of the time, and Increase all balance 1/2 grade to decrease risk for falls   Plan   Treatment/Interventions Functional transfer training;LE strengthening/ROM;Therapeutic exercise;Endurance training;Patient/family  training;Bed mobility;Gait training;Spoke to nursing;OT   PT Frequency 3-5x/wk   Discharge Recommendation   Rehab Resource Intensity Level, PT II (Moderate Resource Intensity)   AM-PAC Basic Mobility Inpatient   Turning in Flat Bed Without Bedrails 3   Lying on Back to Sitting on Edge of Flat Bed Without Bedrails 3   Moving Bed to Chair 3   Standing Up From Chair Using Arms 3   Walk in Room 3   Climb 3-5 Stairs With Railing 2   Basic Mobility Inpatient Raw Score 17   Basic Mobility Standardized Score 39.67   Highest Level Of Mobility   -HLM Goal 5: Stand one or more mins   -HLM Achieved 7: Walk 25 feet or more   Modified [place] Scale   Modified [place] Scale 4   Barthel Index   Feeding 10   Bathing 0   Grooming Score 5   Dressing Score 5   Bladder Score 10   Bowels Score 10   Toilet Use Score 5   Transfers (Bed/Chair) Score 10   Mobility (Level Surface) Score 0   Stairs Score 0   Barthel Index Score 55     PT Evaluation Time: 1029-1374    Mayra Estrada, PT, DPT

## 2024-01-10 NOTE — PLAN OF CARE
Problem: PAIN - ADULT  Goal: Verbalizes/displays adequate comfort level or baseline comfort level  Description: Interventions:  - Encourage patient to monitor pain and request assistance  - Assess pain using appropriate pain scale  - Administer analgesics based on type and severity of pain and evaluate response  - Implement non-pharmacological measures as appropriate and evaluate response  - Consider cultural and social influences on pain and pain management  - Notify physician/advanced practitioner if interventions unsuccessful or patient reports new pain  Outcome: Progressing     Problem: INFECTION - ADULT  Goal: Absence or prevention of progression during hospitalization  Description: INTERVENTIONS:  - Assess and monitor for signs and symptoms of infection  - Monitor lab/diagnostic results  - Monitor all insertion sites, i.e. indwelling lines, tubes, and drains  - Monitor endotracheal if appropriate and nasal secretions for changes in amount and color  - [place] appropriate cooling/warming therapies per order  - Administer medications as ordered  - Instruct and encourage patient and family to use good hand hygiene technique  - Identify and instruct in appropriate isolation precautions for identified infection/condition  Outcome: Progressing  Goal: Absence of fever/infection during neutropenic period  Description: INTERVENTIONS:  - Monitor WBC    Outcome: Progressing     Problem: SAFETY ADULT  Goal: Patient will remain free of falls  Description: INTERVENTIONS:  - Educate patient/family on patient safety including physical limitations  - Instruct patient to call for assistance with activity   - Consult OT/PT to assist with strengthening/mobility   - Keep Call bell within reach  - Keep bed low and locked with side rails adjusted as appropriate  - Keep care items and personal belongings within reach  - Initiate and maintain comfort rounds  - Make Fall Risk Sign visible to staff  - Apply yellow socks and bracelet  for high fall risk patients  - Consider moving patient to room near nurses station  Outcome: Progressing  Goal: Maintain or return to baseline ADL function  Description: INTERVENTIONS:  -  Assess patient's ability to carry out ADLs; assess patient's baseline for ADL function and identify physical deficits which impact ability to perform ADLs (bathing, care of mouth/teeth, toileting, grooming, dressing, etc.)  - Assess/evaluate cause of self-care deficits   - Assess range of motion  - Assess patient's mobility; develop plan if impaired  - Assess patient's need for assistive devices and provide as appropriate  - Encourage maximum independence but intervene and supervise when necessary  - Involve family in performance of ADLs  - Assess for home care needs following discharge   - Consider OT consult to assist with ADL evaluation and planning for discharge  - Provide patient education as appropriate  Outcome: Progressing  Goal: Maintains/Returns to pre admission functional level  Description: INTERVENTIONS:  - Perform AM-PAC 6 Click Basic Mobility/ Daily Activity assessment daily.  - Set and communicate daily mobility goal to care team and patient/family/caregiver.   - Collaborate with rehabilitation services on mobility goals if consulted  - Out of bed for toileting  - Record patient progress and toleration of activity level   Outcome: Progressing     Problem: DISCHARGE PLANNING  Goal: Discharge to home or other facility with appropriate resources  Description: INTERVENTIONS:  - Identify barriers to discharge w/patient and caregiver  - Arrange for needed discharge resources and transportation as appropriate  - Identify discharge learning needs (meds, wound care, etc.)  - Arrange for interpretive services to assist at discharge as needed  - Refer to Case Management Department for coordinating discharge planning if the patient needs post-hospital services based on physician/advanced practitioner order or complex needs  related to functional status, cognitive ability, or social support system  Outcome: Progressing     Problem: Knowledge Deficit  Goal: Patient/family/caregiver demonstrates understanding of disease process, treatment plan, medications, and discharge instructions  Description: Complete learning assessment and assess knowledge base.  Interventions:  - Provide teaching at level of understanding  - Provide teaching via preferred learning methods  Outcome: Progressing     Problem: RESPIRATORY - ADULT  Goal: Achieves optimal ventilation and oxygenation  Description: INTERVENTIONS:  - Assess for changes in respiratory status  - Assess for changes in mentation and behavior  - Position to facilitate oxygenation and minimize respiratory effort  - Oxygen administered by appropriate delivery if ordered  - Initiate smoking cessation education as indicated  - Encourage broncho-pulmonary hygiene including cough, deep breathe, Incentive Spirometry  - Assess the need for suctioning and aspirate as needed  - Assess and instruct to report SOB or any respiratory difficulty  - Respiratory Therapy support as indicated  Outcome: Progressing     Problem: Prexisting or High Potential for Compromised Skin Integrity  Goal: Skin integrity is maintained or improved  Description: INTERVENTIONS:  - Identify patients at risk for skin breakdown  - Assess and monitor skin integrity  - Assess and monitor nutrition and hydration status  - Monitor labs   - Assess for incontinence   - Turn and reposition patient  - Assist with mobility/ambulation  - Relieve pressure over bony prominences  - Avoid friction and shearing  - Provide appropriate hygiene as needed including keeping skin clean and dry  - Evaluate need for skin moisturizer/barrier cream  - Collaborate with interdisciplinary team   - Patient/family teaching  - Consider wound care consult   Outcome: Progressing

## 2024-01-10 NOTE — UTILIZATION REVIEW
Continued Stay Review    Date: 1/8   and 1/10              Current Patient Class:  IP  Current Level of Care: MS    HPI:58 y.o. female initially admitted on  1/5    Assessment/Plan:     1/10 IM Note   Continue Pulmicort, Xopenex, added perforomist. Cont iv decadron . O2 87-92 % on 5-6 l . F/u repeat cultures . Cont iv remdesivir . Dec breath sounds .     1/10 Pulm Consult   Acute hypoxemic respiratory failure she did require 10 to 12 L at the time of admission slowly her requirement has come down currently on 5 L currently saturating around 92%. Continue with Xopenex and Atrovent around-the-clock  On bud[place]ide to continue will add Perforomist she does use Advair Spiriva and albuterol MDI at home. Cont decadron .     1/8 IM note   O2 on 7l . Maintain sat >88 % . Na improved , monitor . Cont iv remdesivir , decadron , coumadin . Wean O2 as able . F/u repeat cultures . Wheezing improved .     Vital Signs:   1/10/24 0749 -- -- -- -- -- 92 % -- 44 -- 6 L/min Nasal cannula --   01/10/24 0748 -- -- -- -- -- 90 % -- 40 -- 5 L/min Nasal cannula --   01/10/24 07:45:10 98.6 °F (37 °C) 103 -- 131/80 97 88 % Abnormal  -- -- -- -- -- --   01/10/24 0511 -- 93 -- -- -- 87 % Abnormal  -- 40 -- 5 L/min Nasal cannula --   01/10/24 0502 -- 85 -- -- -- 84 % Abnormal  -- -- -- -- -- --   01/09/24 20:53:37 97.9 °F (36.6 °C) 103 -- 129/82 98 86 % Abnormal  -- -- -- -- -- --   01/09/24 2036 -- -- -- -- -- -- -- 38 -- 4.5 L/min Nasal cannula --   01/09/24 2001 -- 96 -- -- -- 96 % -- -- -- -- -- --   01/09/24 1954 -- -- -- -- -- 93 % -- 40 -- 5 L/min Nasal cannula --   01/09/24 1506 -- -- -- -- -- 92 % -- 40 -- 5 L/min Nasal cannula --   01/09/24 1450 -- -- -- -- -- 91 % -- 40 -- 5 L/min Mid flow nasal cannula --   01/09/24 14:21:25 97.7 °F (36.5 °C) -- -- 127/81 96 -- -- -- -- -- -- --   01/09/24 14:20:44 97.7 °F (36.5 °C) 99 -- 127/81 96 90 % -- -- -- -- -- --   01/09/24 14:17:46 97.7 °F (36.5 °C) 95 -- 127/81 96 92 % -- -- -- -- -- --    01/09/24 0829 -- 88 20 -- -- -- -- -- -- -- -- --   01/09/24 0826 -- -- -- -- -- 90 % -- 48 -- 7 L/min Mid flow nasal cannula --   01/09/24 08:13:08 98.3 °F (36.8 °C) 98 14 122/73 89 93 % -- -- -- -- -- --   01/09/24 0806 -- -- -- -- -- -- -- 48 7 L/min 7 L/min Mid flow nasal cannula --   01/09/24 0534 -- -- -- -- -- -- -- 48 -- 7 L/min Mid flow nasal cannula --   01/08/24 2300 -- -- -- -- -- 96 % -- 48 -- 7 L/min Mid flow nasal cannula --   01/08/24 22:44:22 98.1 °F (36.7 °C) 85 18 127/73 91 96 % -- -- -- -- -- --   01/08/24 2032 -- -- -- -- -- 88 % Abnormal  -- -- -- -- -- --   01/08/24 1939 -- -- -- -- -- 96 % -- 48 -- 7 L/min Mid flow nasal cannula --   01/08/24 15:21:59 98.1 °F (36.7 °C) 93 -- 112/66 81 98 % -- -- -- -- -- --   01/08/24 15:20:51 98.1 °F (36.7 °C) 86 -- 112/66 81 98 % -- -- -- -- -- --   01/08/24 1419 -- 88 19 -- -- 92 % -- -- -- -- -- --   01/08/24 1404 -- -- -- -- -- 92 % -- 48 -- 7 L/min Mid flow nasal cannula MFNC prongs   01/08/24 1000 -- -- -- -- -- 92 % 48 48 -- 7 L/min Mid flow nasal cannula --   01/08/24 08:05:42 98.8 °F (37.1 °C) 80 -- 112/65 81 98 % -- -- -- -- -- --   01/08/24 0700 -- -- -- -- -- -- -- 48 -- 7 L/min Mid flow nasal cannula --   01/08/24 0300 -- -- -- -- -- 99 % 48 48 7 L/min  7 L/min  Mid flow nasal        Pertinent Labs/Diagnostic Results:   Results from last 7 days   Lab Units 01/05/24  1836   SARS-COV-2  Positive*     Results from last 7 days   Lab Units 01/10/24  0452 01/09/24  0551 01/08/24  1020 01/08/24  0535 01/07/24  0546 01/06/24  0437 01/05/24  1820   WBC Thousand/uL 8.14 6.36 2.94* 4.58 4.70   < > 4.76   HEMOGLOBIN g/dL 13.6 12.0 12.6 12.3 12.4   < > 14.8   HEMATOCRIT % 38.9 36.4 38.5 37.8 37.5   < > 43.3   PLATELETS Thousands/uL 220 184 173 171 149   < > 161   NEUTROS ABS Thousands/µL  --   --   --   --   --   --  3.31    < > = values in this interval not displayed.         Results from last 7 days   Lab Units 01/10/24  0452 01/09/24  0551  01/08/24  0535 01/07/24 2026 01/07/24  0546 01/06/24  1237 01/06/24  0437   SODIUM mmol/L 131* 132* 131* 131* 128*  129*   < > 127*   POTASSIUM mmol/L 3.4* 3.3* 4.1 3.9 3.4*  3.4*   < > 4.5   CHLORIDE mmol/L 88* 87* 88* 86* 88*  89*   < > 85*   CO2 mmol/L 37* 43* 42* 44* 39*  39*   < > 38*   ANION GAP mmol/L 6 2 1 1 1  1   < > 4   BUN mg/dL 9 9 7 11 6  6   < > 7   CREATININE mg/dL 0.26* 0.22* 0.24* 0.40* 0.22*  0.22*   < > 0.26*   EGFR ml/min/1.73sq m 131 139 135 114 139  139   < > 131   CALCIUM mg/dL 8.4 7.6* 7.3* 7.5* 7.0*  6.9*   < > 7.6*   MAGNESIUM mg/dL  --  1.6* 1.6*  --  1.5*  --  1.3*   PHOSPHORUS mg/dL  --   --   --   --   --   --  3.5    < > = values in this interval not displayed.     Results from last 7 days   Lab Units 01/05/24  1902   AST U/L 53*   ALT U/L 25   ALK PHOS U/L 50   TOTAL PROTEIN g/dL 6.5   ALBUMIN g/dL 4.0   TOTAL BILIRUBIN mg/dL 0.39         Results from last 7 days   Lab Units 01/10/24  0452 01/09/24  0551 01/08/24  0535 01/07/24 2026 01/07/24  0546 01/06/24  1803 01/06/24  1237 01/06/24  0437 01/05/24  1902   GLUCOSE RANDOM mg/dL 112 113 115 90 143*  137 138 124 108 117       Results from last 7 days   Lab Units 01/05/24  1849   PH SYD  7.332   PCO2 SYD mm Hg 63.6*   PO2 SYD mm Hg 37.6   HCO3 SYD mmol/L 32.9*   BASE EXC SYD mmol/L 4.7   O2 CONTENT SYD ml/dL 14.6   O2 HGB, VENOUS % 65.1     Results from last 7 days   Lab Units 01/05/24  2311 01/05/24  2100 01/05/24  1820   HS TNI 0HR ng/L  --   --  92*   HS TNI 2HR ng/L  --  77*  --    HSTNI D2 ng/L  --  -15  --    HS TNI 4HR ng/L 73*  --   --    HSTNI D4 ng/L -19  --   --      Results from last 7 days   Lab Units 01/05/24  1847   D-DIMER QUANTITATIVE ug/ml FEU <0.27     Results from last 7 days   Lab Units 01/10/24  0921 01/10/24  0230 01/09/24  2003 01/09/24  1423 01/09/24  0551 01/08/24  1525 01/08/24  1020   PROTIME seconds 21.1*  --   --   --  20.0*  --  18.7*   INR  1.73*  --   --   --  1.62*  --  1.48*   PTT seconds  55* 103* 59*   < > 57*   < > 34    < > = values in this interval not displayed.         Results from last 7 days   Lab Units 01/07/24  0546 01/06/24  0437   PROCALCITONIN ng/ml 0.05 0.13     Results from last 7 days   Lab Units 01/05/24  1820   LACTIC ACID mmol/L 1.2     Results from last 7 days   Lab Units 01/05/24  1820   BNP pg/mL 262*         Results from last 7 days   Lab Units 01/07/24  0546 01/06/24  0437   CRP mg/L <1.0 1.4         Results from last 7 days   Lab Units 01/06/24  0442   OSMO UR mmol/*     Results from last 7 days   Lab Units 01/06/24  0442   SODIUM UR  <10     Results from last 7 days   Lab Units 01/05/24  1836   INFLUENZA A PCR  Negative   INFLUENZA B PCR  Negative   RSV PCR  Negative     Results from last 7 days   Lab Units 01/07/24  1415 01/07/24  1411 01/05/24  1824 01/05/24  1820   BLOOD CULTURE  No Growth at 48 hrs. No Growth at 48 hrs. Micrococcus luteus* No Growth After 4 Days.   GRAM STAIN RESULT   --   --  Gram positive cocci in clusters*  --        Results from last 7 days   Lab Units 01/08/24  0535   VANCOMYCIN TR ug/mL 7.7*       Medications:   Scheduled Medications:  budesonide, 0.5 mg, Nebulization, Q12H  dexamethasone, 6 mg, Intravenous, Q24H  ipratropium, 0.5 mg, Nebulization, TID  levalbuterol, 1.25 mg, Nebulization, TID  nicotine, 1 patch, Transdermal, Daily  warfarin, 5 mg, Oral, Daily (warfarin)      Continuous IV Infusions:  heparin (porcine), 3-20 Units/kg/hr (Order-Specific), Intravenous, Titrated      PRN Meds:  acetaminophen, 650 mg, Oral, Q6H PRN  albuterol, 2 puff, Inhalation, Q4H PRN  albuterol, 2.5 mg, Nebulization, Q6H PRN  heparin (porcine), 1,200 Units, Intravenous, Q6H PRN  heparin (porcine), 2,400 Units, Intravenous, Q6H PRN  ondansetron, 4 mg, Intravenous, Q6H PRN  sodium chloride, 2 spray, Each Nare, Q1H PRN        Discharge Plan: TBD     Network Utilization Review Department  ATTENTION: Please call with any questions or concerns to [phone] and  carefully listen to the prompts so that you are directed to the right person. All voicemails are confidential.   For Discharge needs, contact Care Management DC Support Team at [phone] opt. 2  Send all requests for admission clinical reviews, approved or denied determinations and any other requests to dedicated fax number below belonging to the campus where the patient is receiving treatment. List of dedicated fax numbers for the Facilities:  FACILITY NAME UR FAX NUMBER   ADMISSION DENIALS (Administrative/Medical Necessity) [phone]   DISCHARGE SUPPORT TEAM (NETWORK) [phone]   PARENT CHILD HEALTH (Maternity/NICU/Pediatrics) [phone]   [facility] [phone]   [facility] [phone]   [facility] [phone]   [facility] [phone]   [facility] [phone]   [facility] [phone]   General acute hospital [phone]   [facility] [phone]   [facility] [phone]   [facility] [phone]   [facility] [phone]

## 2024-01-10 NOTE — PLAN OF CARE
Problem: PHYSICAL THERAPY ADULT  Goal: Performs mobility at highest level of function for planned discharge setting.  See evaluation for individualized goals.  Description: Treatment/Interventions: Functional transfer training, LE strengthening/ROM, Therapeutic exercise, Endurance training, Patient/family training, Bed mobility, Gait training, Spoke to nursing, OT          See flowsheet documentation for full assessment, interventions and recommendations.  Note: Prognosis: Good  Problem List: Decreased strength, Decreased endurance, Impaired balance, Decreased mobility, Pain  Assessment: Pt is 58 year old female seen for PT evaluation s/p admit to [facility] on 1/5/2024 with Acute respiratory failure with hypoxia (HCC). PT consulted to assess pt's functional mobility and discharge needs. Order placed for PT evaluation and treatment, with activity as tolerated order. Comorbidities affecting pt's physical performance at time of assessment include COVID-19, COPD, multiple sclerosis, thoracic outlet syndrome, hyponatremia, severe protein calorie malnutrition, positive blood culture, and hypomagnesemia. Prior to hospitalization, pt was independent with all functional mobility without an AD. Pt ambulates unrestricted distances on all terrain and elevations. Pt resides with her spouse, in a one level house with no steps to enter. Personal factors affecting pt at time of initial evaluation include difficulty ambulating household distances, inability to ambulate community distances, inability to navigate level surfaces without external assistance, unable to perform dynamic tasks in the community, difficulty performing ADLs, and inability to perform IADLs. Please find objective findings from PT assessment regarding body systems outlined above with impairments and limitations including weakness, impaired balance, decreased endurance, gait deviations, pain, decreased activity tolerance, decreased functional mobility  tolerance, fall risk, and SOB upon exertion. The following objective measures were performed on initial evaluation Barthel Index: 55/100, Modified [place]: 4 (moderate/severe disability), and AM-PAC 6-Clicks: 17/24. Pt's clinical presentation is currently unstable/unpredictable seen in pt's presentation of need for ongoing medical management/monitoring, pt is a fall risk, pt is currently requiring supplemental oxygen, and pt requires cues and assist for safety with functional mobility. Pt to benefit from continued PT treatment to address deficits as defined above and maximize pt's level of function and independence with mobility. From a PT standpoint, recommendation at time of discharge would be level 2, moderate resource intensity in order to facilitate return to prior level of function.    Barriers to Discharge: Other (Comment) (decline in functional mobility)     Rehab Resource Intensity Level, PT: II (Moderate Resource Intensity)    See flowsheet documentation for full assessment.

## 2024-01-10 NOTE — OCCUPATIONAL THERAPY NOTE
Occupational Therapy Evaluation        Patient Name: Loretta Partida  Today's Date: 1/10/2024         01/10/24 0856   OT Last Visit   OT Visit Date 01/10/24   Note Type   Note type Evaluation   Pain Assessment   Pain Assessment Tool 0-10   Pain Score No Pain   Restrictions/Precautions   Weight Bearing Precautions Per Order No   Braces or Orthoses LSO  (LSO PRN for pain)   Other Precautions Chair Alarm;Bed Alarm;Fall Risk   Home Living   Type of Home House   Home Layout One level;Performs ADLs on one level  (no RYAN)   Bathroom Shower/Tub Tub/shower unit   Bathroom Toilet Raised   Bathroom Equipment Shower chair   Bathroom Accessibility Accessible   Home Equipment Walker;Wheelchair-manual;Cane  (Rollator/ Transport Chair)   Prior Function   Level of [place] Independent with ADLs   Lives With Spouse   Receives Help From Family   IADLs Family/Friend/Other provides transportation;Independent with meal prep;Independent with medication management   Falls in the last 6 months 0   Vocational Unemployed   Lifestyle   Autonomy Patient reported she lives with her  in a one story house, no RYAN. Patient was independent with ADLs,  assists with transportation, independnet with meals and medications.   Reciprocal Relationships Supportive family   General   Family/Caregiver Present No   ADL   Eating Assistance 6  Modified independent   Grooming Assistance 5  Supervision/Setup   UB Bathing Assistance 4  Minimal Assistance   LB Bathing Assistance 3  Moderate Assistance   UB Dressing Assistance 4  Minimal Assistance   LB Dressing Assistance 3  Moderate Assistance   Toileting Assistance  3  Moderate Assistance   Functional Assistance 3  Moderate Assistance   Bed Mobility   Supine to Sit 4  Minimal assistance   Additional items Assist x 1;HOB elevated;Increased time required;Verbal cues;LE management   Transfers   Sit to Stand 4  Minimal assistance   Additional items Assist x 1;Increased time required;Verbal cues    Stand to Sit 4  Minimal assistance   Additional items Assist x 1;Increased time required;Verbal cues;Armrests   Functional Mobility   Functional Mobility 4  Minimal assistance   Additional Comments assist of 1 with hand hel assist   Balance   Static Sitting Good   Dynamic Sitting Fair +   Static Standing Fair   Dynamic Standing Fair -   Activity Tolerance   Activity Tolerance Patient limited by fatigue   Medical Staff Made Aware Pt seen as a co-eval with PT due to the patient's co-morbidities, clinically unstable presentation, and present impairments which are a regression from the patient's baseline.   RUE Assessment   RUE Assessment X   RUE Strength   RUE Overall Strength Deficits  (3+/5)   LUE Assessment   LUE Assessment X   LUE Strength   LUE Overall Strength Deficits  (3+/5)   Hand Function   Gross Motor Coordination Functional   Fine Motor Coordination Functional   Sensation   Light Touch No apparent deficits  (BUEs)   Vision-Basic Assessment   Current Vision Wears glasses all the time   Psychosocial   Psychosocial (WDL) WDL   Cognition   Overall Cognitive Status WFL   Arousal/Participation Alert;Responsive;Cooperative   Attention Within functional limits   Orientation Level Oriented X4   Memory Within functional limits   Following Commands Follows all commands and directions without difficulty   Assessment   Limitation Decreased ADL status;Decreased UE strength;Decreased endurance;Decreased self-care trans;Decreased high-level ADLs   Prognosis Good   Assessment Patient is a 58 y.o. female seen for OT evaluation s/p admit to [facility] on 1/5/2024 w/Acute respiratory failure with hypoxia (HCC). Commorbidities affecting patient's functional performance at time of assessment include:  COPD, Thoracic outlet syndrome, Hyponatremia, MS, Covid 19 virus, presented to ED with SOB. Orders placed for OT evaluation and treatment.  Performed at least two patient identifiers during session including name and  wristband.  Prior to admission,  Patient reported she lives with her  in a one story house, no RYAN. Patient was independent with ADLs,  assists with transportation, independnet with meals and medications.  Personal factors affecting patient at time of initial evaluation include: decreased initiation and engagement, difficulty performing ADLs, and difficulty performing IADLs. Upon evaluation, patient requires supervision, set up, and minimal  assist for UB ADLs, minimal  assist for LB ADLs, transfers and functional ambulation in room and bathroom with minimal  assist, with hand hold assist.  Occupational performance is affected by the following deficits: irritability, flat affect, decreased functional use of BUEs, degenerative arthritic joint changes, dynamic sit/ stand balance deficit with poor standing tolerance time for self care and functional mobility, decreased activity tolerance, decreased safety awareness, and postural control and postural alignment deficit, requiring external assistance to complete transitional movements.  Patient to benefit from continued Occupational Therapy treatment while in the hospital to address deficits as defined above and maximize level of functional independence with ADLs and functional mobility. Occupational Performance areas to address include: bathing/ shower, dressing, toilet hygiene, transfer to all surfaces, functional mobility, emergency response, health maintenance, IADLs: safety procedures, and Leisure Participation. From OT standpoint, recommendation at time of d/c would be Level 2.   Discharge Recommendation   Rehab Resource Intensity Level, OT II (Moderate Resource Intensity)   AM-PAC Daily Activity Inpatient   Lower Body Dressing 2   Bathing 2   Toileting 3   Upper Body Dressing 3   Grooming 3   Eating 4   Daily Activity Raw Score 17   Daily Activity Standardized Score (Calc for Raw Score >=11) 37.26   AM-PAC Applied Cognition Inpatient   Following a  Speech/Presentation 4   Understanding Ordinary Conversation 4   Taking Medications 4   Remembering Where Things Are Placed or Put Away 4   Remembering List of 4-5 Errands 4   Taking Care of Complicated Tasks 4   Applied Cognition Raw Score 24   Applied Cognition Standardized Score 62.21   Barthel Index   Feeding 10   Bathing 0   Grooming Score 0   Dressing Score 5   Bladder Score 10   Bowels Score 10   Toilet Use Score 5   Transfers (Bed/Chair) Score 10   Mobility (Level Surface) Score 0   Stairs Score 0   Barthel Index Score 50         1 - Patient will verbalize and demonstrate use of energy conservation/ deep breathing technique and work simplification skills during functional activity with no verbal cues.    2 - Patient will verbalize and demonstrate good body mechanics and joint protection techniques during  ADLs/ IADLs with no verbal cues.    3 - Patient will increase OOB/ sitting tolerance to 2-4 hours per day for increased participation in self care and leisure tasks with no s/s of exertion.    4 - Patient will increase standing tolerance time to 5  minutes with unilateral UE support to complete sink level ADLs@ mod I level.    5 - Patient will increase sitting tolerance at edge of bed to 20 minutes to complete UB ADLs @ set up assist level.    6 - Patient will transfer bed to Chair / toilet at Set up assist level with AD as indicated.     7 - Patient will complete UB ADLs with set up assist.     8 - Patient will complete LB ADLs with min assist with the use of adaptive equipment.     9 - Patient will complete toileting hygiene with set up assist/ supervision for thoroughness    10 - Patient/ Family  will demonstrate competency with UE Home Exercise Program.

## 2024-01-10 NOTE — ASSESSMENT & PLAN NOTE
Continue home inhaler regimen  Very poor air movement, suspect severe underlying disease  Continue Pulmicort, Xopenex, added Perforomist, continue IV Decadron.  Pulmonary recommendation appreciated.

## 2024-01-10 NOTE — NURSING NOTE
Pt complaining of SOB, currently on 5L NC. Pt O2 increased to 6L NC however, pt 85-88% SPO2. Pt complaining that her nose is stuffy so she feels as if the oxygen is not working. Asked on call [facility] for a nasal decongestant spray, however, pt refused saying that she was born stuffy and it won't help. Contacted RT Vicky Oleary who gave the pt a nebulizer. Pt was then bumped down to 5L NC and in the 90s% for SPO2. No further orders or pt complaints at this time.

## 2024-01-10 NOTE — PLAN OF CARE
Problem: PAIN - ADULT  Goal: Verbalizes/displays adequate comfort level or baseline comfort level  Description: Interventions:  - Encourage patient to monitor pain and request assistance  - Assess pain using appropriate pain scale  - Administer analgesics based on type and severity of pain and evaluate response  - Implement non-pharmacological measures as appropriate and evaluate response  - Consider cultural and social influences on pain and pain management  - Notify physician/advanced practitioner if interventions unsuccessful or patient reports new pain  Outcome: Progressing     Problem: INFECTION - ADULT  Goal: Absence or prevention of progression during hospitalization  Description: INTERVENTIONS:  - Assess and monitor for signs and symptoms of infection  - Monitor lab/diagnostic results  - Monitor all insertion sites, i.e. indwelling lines, tubes, and drains  - Monitor endotracheal if appropriate and nasal secretions for changes in amount and color  - [place] appropriate cooling/warming therapies per order  - Administer medications as ordered  - Instruct and encourage patient and family to use good hand hygiene technique  - Identify and instruct in appropriate isolation precautions for identified infection/condition  Outcome: Progressing  Goal: Absence of fever/infection during neutropenic period  Description: INTERVENTIONS:  - Monitor WBC    Outcome: Progressing     Problem: SAFETY ADULT  Goal: Maintain or return to baseline ADL function  Description: INTERVENTIONS:  -  Assess patient's ability to carry out ADLs; assess patient's baseline for ADL function and identify physical deficits which impact ability to perform ADLs (bathing, care of mouth/teeth, toileting, grooming, dressing, etc.)  - Assess/evaluate cause of self-care deficits   - Assess range of motion  - Assess patient's mobility; develop plan if impaired  - Assess patient's need for assistive devices and provide as appropriate  - Encourage  maximum independence but intervene and supervise when necessary  - Involve family in performance of ADLs  - Assess for home care needs following discharge   - Consider OT consult to assist with ADL evaluation and planning for discharge  - Provide patient education as appropriate  Outcome: Progressing     Problem: DISCHARGE PLANNING  Goal: Discharge to home or other facility with appropriate resources  Description: INTERVENTIONS:  - Identify barriers to discharge w/patient and caregiver  - Arrange for needed discharge resources and transportation as appropriate  - Identify discharge learning needs (meds, wound care, etc.)  - Arrange for interpretive services to assist at discharge as needed  - Refer to Case Management Department for coordinating discharge planning if the patient needs post-hospital services based on physician/advanced practitioner order or complex needs related to functional status, cognitive ability, or social support system  Outcome: Progressing     Problem: Knowledge Deficit  Goal: Patient/family/caregiver demonstrates understanding of disease process, treatment plan, medications, and discharge instructions  Description: Complete learning assessment and assess knowledge base.  Interventions:  - Provide teaching at level of understanding  - Provide teaching via preferred learning methods  Outcome: Progressing     Problem: RESPIRATORY - ADULT  Goal: Achieves optimal ventilation and oxygenation  Description: INTERVENTIONS:  - Assess for changes in respiratory status  - Assess for changes in mentation and behavior  - Position to facilitate oxygenation and minimize respiratory effort  - Oxygen administered by appropriate delivery if ordered  - Initiate smoking cessation education as indicated  - Encourage broncho-pulmonary hygiene including cough, deep breathe, Incentive Spirometry  - Assess the need for suctioning and aspirate as needed  - Assess and instruct to report SOB or any respiratory  difficulty  - Respiratory Therapy support as indicated  Outcome: Progressing     Problem: Nutrition/Hydration-ADULT  Goal: Nutrient/Hydration intake appropriate for improving, restoring or maintaining nutritional needs  Description: Monitor and assess patient's nutrition/hydration status for malnutrition. Collaborate with interdisciplinary team and initiate plan and interventions as ordered.  Monitor patient's weight and dietary intake as ordered or per policy. Utilize nutrition screening tool and intervene as necessary. Determine patient's food preferences and provide high-protein, high-caloric foods as appropriate.     INTERVENTIONS:  - Monitor oral intake, urinary output, labs, and treatment plans  - Assess nutrition and hydration status and recommend course of action  - Evaluate amount of meals eaten  - Assist patient with eating if necessary   - Allow adequate time for meals  - Recommend/ encourage appropriate diets, oral nutritional supplements, and vitamin/mineral supplements  - Order, calculate, and assess calorie counts as needed  - Recommend, monitor, and adjust tube feedings and TPN/PPN based on assessed needs  - Assess need for intravenous fluids  - Provide specific nutrition/hydration education as appropriate  - Include patient/family/caregiver in decisions related to nutrition  Outcome: Progressing

## 2024-01-10 NOTE — PLAN OF CARE
Problem: OCCUPATIONAL THERAPY ADULT  Goal: Performs self-care activities at highest level of function for planned discharge setting.  See evaluation for individualized goals.  Description:            See flowsheet documentation for full assessment, interventions and recommendations.   Note: Limitation: Decreased ADL status, Decreased UE strength, Decreased endurance, Decreased self-care trans, Decreased high-level ADLs  Prognosis: Good  Assessment: Patient is a 58 y.o. female seen for OT evaluation s/p admit to [facility] on 1/5/2024 w/Acute respiratory failure with hypoxia (HCC). Commorbidities affecting patient's functional performance at time of assessment include:  COPD, Thoracic outlet syndrome, Hyponatremia, MS, Covid 19 virus, presented to ED with SOB. Orders placed for OT evaluation and treatment.  Performed at least two patient identifiers during session including name and wristband.  Prior to admission,  Patient reported she lives with her  in a one story house, no RYAN. Patient was independent with ADLs,  assists with transportation, independnet with meals and medications.  Personal factors affecting patient at time of initial evaluation include: decreased initiation and engagement, difficulty performing ADLs, and difficulty performing IADLs. Upon evaluation, patient requires supervision, set up, and minimal  assist for UB ADLs, minimal  assist for LB ADLs, transfers and functional ambulation in room and bathroom with minimal  assist, with hand hold assist.  Occupational performance is affected by the following deficits: irritability, flat affect, decreased functional use of BUEs, degenerative arthritic joint changes, dynamic sit/ stand balance deficit with poor standing tolerance time for self care and functional mobility, decreased activity tolerance, decreased safety awareness, and postural control and postural alignment deficit, requiring external assistance to complete  transitional movements.  Patient to benefit from continued Occupational Therapy treatment while in the hospital to address deficits as defined above and maximize level of functional independence with ADLs and functional mobility. Occupational Performance areas to address include: bathing/ shower, dressing, toilet hygiene, transfer to all surfaces, functional mobility, emergency response, health maintenance, IADLs: safety procedures, and Leisure Participation. From OT standpoint, recommendation at time of d/c would be Level 2.     Rehab Resource Intensity Level, OT: II (Moderate Resource Intensity)

## 2024-01-11 ENCOUNTER — APPOINTMENT (INPATIENT)
Dept: NON INVASIVE DIAGNOSTICS | Facility: HOSPITAL | Age: 59
DRG: 177 | End: 2024-01-11
Payer: COMMERCIAL

## 2024-01-11 PROBLEM — J44.1 COPD EXACERBATION (HCC): Status: ACTIVE | Noted: 2024-01-05

## 2024-01-11 LAB
AORTIC ROOT: 2.7 CM
AORTIC VALVE MEAN VELOCITY: 11.3 M/S
APICAL FOUR CHAMBER EJECTION FRACTION: 62 %
APTT PPP: 108 SECONDS (ref 23–37)
APTT PPP: 55 SECONDS (ref 23–37)
APTT PPP: 99 SECONDS (ref 23–37)
ARTERIAL PATENCY WRIST A: YES
AV AREA BY CONTINUOUS VTI: 2.4 CM2
AV AREA PEAK VELOCITY: 2.5 CM2
AV LVOT MEAN GRADIENT: 4 MMHG
AV LVOT PEAK GRADIENT: 8 MMHG
AV MEAN GRADIENT: 6 MMHG
AV PEAK GRADIENT: 12 MMHG
AV REGURGITATION PRESSURE HALF TIME: 455 MS
AV VALVE AREA: 2.43 CM2
BASE EXCESS BLDA CALC-SCNC: 14.6 MMOL/L
BSA FOR ECHO PROCEDURE: 1.28 M2
BUN SERPL-MCNC: 12 MG/DL (ref 5–25)
CALCIUM SERPL-MCNC: 8.4 MG/DL (ref 8.4–10.2)
CHLORIDE SERPL-SCNC: 89 MMOL/L (ref 96–108)
CO2 SERPL-SCNC: >45 MMOL/L (ref 21–32)
CREAT SERPL-MCNC: 0.3 MG/DL (ref 0.6–1.3)
DOP CALC AO VTI: 32.4 CM
DOP CALC LVOT AREA: 3.14
DOP CALC LVOT DIAMETER: 2 CM
DOP CALC LVOT PEAK VEL VTI: 25.1 CM
DOP CALC LVOT PEAK VEL: 1.38 M/S
DOP CALC LVOT STROKE INDEX: 61.2 ML/M2
DOP CALC LVOT STROKE VOLUME: 78.81
E WAVE DECELERATION TIME: 175 MS
E/A RATIO: 0.91
ERYTHROCYTE [DISTWIDTH] IN BLOOD BY AUTOMATED COUNT: 12.2 % (ref 11.6–15.1)
FRACTIONAL SHORTENING: 31 (ref 28–44)
GFR SERPL CREATININE-BSD FRML MDRD: 125 ML/MIN/1.73SQ M
GLUCOSE SERPL-MCNC: 92 MG/DL (ref 65–140)
HCO3 BLDA-SCNC: 42.2 MMOL/L (ref 22–28)
HCT VFR BLD AUTO: 36.7 % (ref 34.8–46.1)
HGB BLD-MCNC: 12.1 G/DL (ref 11.5–15.4)
INR PPP: 2.38 (ref 0.84–1.19)
INTERVENTRICULAR SEPTUM IN DIASTOLE (PARASTERNAL SHORT AXIS VIEW): 0.8 CM
INTERVENTRICULAR SEPTUM: 0.8 CM (ref 0.6–1.1)
LA/AORTA RATIO 2D: 1.04
LEFT ATRIUM SIZE: 2.8 CM
LEFT INTERNAL DIMENSION IN SYSTOLE: 2.5 CM (ref 2.1–4)
LEFT VENTRICLE DIASTOLIC VOLUME (MOD BIPLANE): 89 ML
LEFT VENTRICLE DIASTOLIC VOLUME INDEX (MOD BIPLANE): 69.5 ML/M2
LEFT VENTRICLE SYSTOLIC VOLUME (MOD BIPLANE): 37 ML
LEFT VENTRICLE SYSTOLIC VOLUME INDEX (MOD BIPLANE): 28.9 ML/M2
LEFT VENTRICULAR INTERNAL DIMENSION IN DIASTOLE: 3.6 CM (ref 3.5–6)
LEFT VENTRICULAR POSTERIOR WALL IN END DIASTOLE: 0.8 CM
LEFT VENTRICULAR STROKE VOLUME: 30 ML
LV EF: 58 %
LVSV (TEICH): 30 ML
MCH RBC QN AUTO: 33.1 PG (ref 26.8–34.3)
MCHC RBC AUTO-ENTMCNC: 33 G/DL (ref 31.4–37.4)
MCV RBC AUTO: 100 FL (ref 82–98)
MV PEAK A VEL: 1.14 M/S
MV PEAK E VEL: 104 CM/S
MV STENOSIS PRESSURE HALF TIME: 51 MS
MV VALVE AREA P 1/2 METHOD: 4.31
NASAL CANNULA: 4
O2 CT BLDA-SCNC: 17.2 ML/DL (ref 16–23)
OXYHGB MFR BLDA: 89 % (ref 94–97)
PCO2 BLDA: 66.3 MM HG (ref 36–44)
PH BLDA: 7.42 [PH] (ref 7.35–7.45)
PLATELET # BLD AUTO: 192 THOUSANDS/UL (ref 149–390)
PMV BLD AUTO: 9.1 FL (ref 8.9–12.7)
PO2 BLDA: 55.7 MM HG (ref 75–129)
POTASSIUM SERPL-SCNC: 3.5 MMOL/L (ref 3.5–5.3)
PROTHROMBIN TIME: 26.9 SECONDS (ref 11.6–14.5)
RBC # BLD AUTO: 3.66 MILLION/UL (ref 3.81–5.12)
SL CV AV PEAK GRADIENT RETROGRADE: 58 MMHG
SL CV LV EF: 60
SL CV PED ECHO LEFT VENTRICLE DIASTOLIC VOLUME (MOD BIPLANE) 2D: 53 ML
SL CV PED ECHO LEFT VENTRICLE SYSTOLIC VOLUME (MOD BIPLANE) 2D: 23 ML
SODIUM SERPL-SCNC: 136 MMOL/L (ref 135–147)
WBC # BLD AUTO: 6.75 THOUSAND/UL (ref 4.31–10.16)

## 2024-01-11 PROCEDURE — 93308 TTE F-UP OR LMTD: CPT

## 2024-01-11 PROCEDURE — 93321 DOPPLER ECHO F-UP/LMTD STD: CPT | Performed by: INTERNAL MEDICINE

## 2024-01-11 PROCEDURE — 80048 BASIC METABOLIC PNL TOTAL CA: CPT | Performed by: STUDENT IN AN ORGANIZED HEALTH CARE EDUCATION/TRAINING PROGRAM

## 2024-01-11 PROCEDURE — 82805 BLOOD GASES W/O2 SATURATION: CPT

## 2024-01-11 PROCEDURE — 99232 SBSQ HOSP IP/OBS MODERATE 35: CPT | Performed by: STUDENT IN AN ORGANIZED HEALTH CARE EDUCATION/TRAINING PROGRAM

## 2024-01-11 PROCEDURE — 85610 PROTHROMBIN TIME: CPT | Performed by: STUDENT IN AN ORGANIZED HEALTH CARE EDUCATION/TRAINING PROGRAM

## 2024-01-11 PROCEDURE — 85027 COMPLETE CBC AUTOMATED: CPT | Performed by: STUDENT IN AN ORGANIZED HEALTH CARE EDUCATION/TRAINING PROGRAM

## 2024-01-11 PROCEDURE — 99232 SBSQ HOSP IP/OBS MODERATE 35: CPT | Performed by: INTERNAL MEDICINE

## 2024-01-11 PROCEDURE — 93321 DOPPLER ECHO F-UP/LMTD STD: CPT

## 2024-01-11 PROCEDURE — 93308 TTE F-UP OR LMTD: CPT | Performed by: INTERNAL MEDICINE

## 2024-01-11 PROCEDURE — 85730 THROMBOPLASTIN TIME PARTIAL: CPT | Performed by: STUDENT IN AN ORGANIZED HEALTH CARE EDUCATION/TRAINING PROGRAM

## 2024-01-11 PROCEDURE — 93325 DOPPLER ECHO COLOR FLOW MAPG: CPT | Performed by: INTERNAL MEDICINE

## 2024-01-11 PROCEDURE — 93325 DOPPLER ECHO COLOR FLOW MAPG: CPT

## 2024-01-11 PROCEDURE — 94760 N-INVAS EAR/PLS OXIMETRY 1: CPT

## 2024-01-11 PROCEDURE — 94664 DEMO&/EVAL PT USE INHALER: CPT

## 2024-01-11 PROCEDURE — 94640 AIRWAY INHALATION TREATMENT: CPT

## 2024-01-11 PROCEDURE — 36600 WITHDRAWAL OF ARTERIAL BLOOD: CPT

## 2024-01-11 RX ADMIN — FORMOTEROL FUMARATE DIHYDRATE 20 MCG: 20 SOLUTION RESPIRATORY (INHALATION) at 20:34

## 2024-01-11 RX ADMIN — BUDESONIDE INHALATION 0.5 MG: 0.5 SUSPENSION RESPIRATORY (INHALATION) at 20:34

## 2024-01-11 RX ADMIN — FORMOTEROL FUMARATE DIHYDRATE 20 MCG: 20 SOLUTION RESPIRATORY (INHALATION) at 07:00

## 2024-01-11 RX ADMIN — IPRATROPIUM BROMIDE 0.5 MG: 0.5 SOLUTION RESPIRATORY (INHALATION) at 07:00

## 2024-01-11 RX ADMIN — WARFARIN SODIUM 5 MG: 5 TABLET ORAL at 19:11

## 2024-01-11 RX ADMIN — IPRATROPIUM BROMIDE 0.5 MG: 0.5 SOLUTION RESPIRATORY (INHALATION) at 14:31

## 2024-01-11 RX ADMIN — LEVALBUTEROL HYDROCHLORIDE 1.25 MG: 1.25 SOLUTION RESPIRATORY (INHALATION) at 07:00

## 2024-01-11 RX ADMIN — IPRATROPIUM BROMIDE 0.5 MG: 0.5 SOLUTION RESPIRATORY (INHALATION) at 20:34

## 2024-01-11 RX ADMIN — DEXAMETHASONE SODIUM PHOSPHATE 6 MG: 10 INJECTION, SOLUTION INTRAMUSCULAR; INTRAVENOUS at 05:00

## 2024-01-11 RX ADMIN — BUDESONIDE INHALATION 0.5 MG: 0.5 SUSPENSION RESPIRATORY (INHALATION) at 07:00

## 2024-01-11 RX ADMIN — LEVALBUTEROL HYDROCHLORIDE 1.25 MG: 1.25 SOLUTION RESPIRATORY (INHALATION) at 14:31

## 2024-01-11 RX ADMIN — LEVALBUTEROL HYDROCHLORIDE 1.25 MG: 1.25 SOLUTION RESPIRATORY (INHALATION) at 20:34

## 2024-01-11 NOTE — ASSESSMENT & PLAN NOTE
Patient presented with progressive shortness of breath and dyspnea and found to be positive for COVID in the emergency room  This is superimposed on baseline COPD, active smoking    Continue IV remdesivir x 5 days  IV Decadron 6 mg daily x 10 days  CRP < 1: will dc baricitinib.  Insert markers are within normal limits, D-dimer normal limit.  Patient will likely require home oxygen upon discharge.  INR currently therapeutic will discontinue IV heparin continue Coumadin.  Procalcitonin negative x 2; continue to monitor off antibiotics  Trend inflammatory markers

## 2024-01-11 NOTE — PROGRESS NOTES
[facility]  Progress Note  Name: Loretta Partida I  MRN: [id number]  Unit/Bed#: -01 I Date of Admission: 1/5/2024   Date of Service: 1/11/2024 I Hospital Day: 6    Assessment/Plan   * Acute respiratory failure with hypoxia (HCC)  Assessment & Plan  Acute respiratory failure with hypoxia secondary to COVID-19 infection superimposed on baseline COPD  Currently over saturation 93-95 % on 4L nasal cannula.  Continue Pulmicort, Xopenex,  perforomist  Continue IV Decadron.  Continue to monitor and titrate as needed to maintain oxygen saturation greater than 88%  See treatment plan below for COVID  Pulmonology recommendation appreciated.    Hypomagnesemia  Assessment & Plan  Continue supplement electrolytes as needed.    Positive blood culture  Assessment & Plan  1 out of 2 blood cultures on admission growing Micrococcus luteus  This appears consistent with contamination  Will discontinue vancomycin and monitor off antibiotic therapy  Repeat culture without growth in 72 hours.    Severe protein-calorie malnutrition (HCC)  Assessment & Plan  Malnutrition Findings:   Adult Malnutrition type: Chronic illness  Adult Degree of Malnutrition: Other severe protein calorie malnutrition  Malnutrition Characteristics: Weight loss, Inadequate energy                  360 Statement: Related to chronic illness (COPD, MS) as evidenced by consuming < 75% of energy energy requirement for > 1 month and reported 24% weight loss over the past year. Treated with diet and supplements.    BMI Findings:  Adult BMI Classifications: Underweight < 18.5        Body mass index is 13.46 kg/m².       Hyponatremia  Assessment & Plan  Severe hyponatremia on admission at 117; probably secondary to SIADH  Now improving with fluid restriction  Currently sodium 136  Continue to monitor daily BMP  Nephrology recommendation appreciated.    Thoracic outlet syndrome  Assessment & Plan  INR now therapeutic.  Discontinue IV heparin  drip.  Continue Coumadin 5 mg daily with daily INR checks    Multiple sclerosis (HCC)  Assessment & Plan   patient is on some pain medications and Valium as needed    COPD exacerbation (HCC)  Assessment & Plan  Continue home inhaler regimen  Very poor air movement, suspect severe underlying disease  Continue Pulmicort, Xopenex, added Perforomist, continue IV Decadron.  Pulmonary recommendation appreciated.      COVID-19  Assessment & Plan  Patient presented with progressive shortness of breath and dyspnea and found to be positive for COVID in the emergency room  This is superimposed on baseline COPD, active smoking    Continue IV remdesivir x 5 days  IV Decadron 6 mg daily x 10 days  CRP < 1: will dc baricitinib.  Insert markers are within normal limits, D-dimer normal limit.  Patient will likely require home oxygen upon discharge.  INR currently therapeutic will discontinue IV heparin continue Coumadin.  Procalcitonin negative x 2; continue to monitor off antibiotics  Trend inflammatory markers           VTE Pharmacologic Prophylaxis: VTE Score: 5 Moderate Risk (Score 3-4) - Pharmacological DVT Prophylaxis Ordered: warfarin (Coumadin).    Mobility:   Basic Mobility Inpatient Raw Score: 17  -[facility] Goal: 5: Stand one or more mins  -[facility] Achieved: 6: Walk 10 steps or more      Patient Centered Rounds: I performed bedside rounds with nursing staff today.     Spoke with  Artemio over the phone at length.     Total Time Spent on Date of Encounter in care of patient: 25 mins. This time was spent on one or more of the following: performing physical exam; counseling and coordination of care; obtaining or reviewing history; documenting in the medical record; reviewing/ordering tests, medications or procedures; communicating with other healthcare professionals and discussing with patient's family/caregivers.    Current Length of Stay: 6 day(s)  Current Patient Status: Inpatient   Certification Statement: The patient  will continue to require additional inpatient hospital stay due to hypoxia  Discharge Plan: Anticipate discharge tomorrow to home.    Code Status: Level 1 - Full Code    Subjective:     Seen during a.m. rounds.  Ulceration improved after adding Perforomist.  Currently 93 to 95% on 4 L nasal cannula.  Patient adamantly requesting to go home.  Denies chest pain, nausea, vomiting, diarrhea, any other new complaints.  Currently getting echocardiogram.    Objective:     Vitals:   Temp (24hrs), Av.7 °F (37.1 °C), Min:98.4 °F (36.9 °C), Max:98.9 °F (37.2 °C)    Temp:  [98.4 °F (36.9 °C)-98.9 °F (37.2 °C)] 98.9 °F (37.2 °C)  HR:  [] 105  Resp:  [18] 18  BP: (134-135)/(74-77) 135/74  SpO2:  [90 %-99 %] 91 %  Body mass index is 13.46 kg/m².     Input and Output Summary (last 24 hours):     Intake/Output Summary (Last 24 hours) at 2024 1258  Last data filed at 1/10/2024 1300  Gross per 24 hour   Intake 360 ml   Output --   Net 360 ml       Physical Exam:   Physical Exam  Constitutional:       Comments: Elderly female patient, appears older than stated age, cachectic, chronically ill and deconditioned, acutely nontoxic appearing.   HENT:      Head: Normocephalic and atraumatic.   Eyes:      Pupils: Pupils are equal, round, and reactive to light.   Cardiovascular:      Rate and Rhythm: Normal rate.      Pulses: Normal pulses.   Pulmonary:      Effort: Pulmonary effort is normal. No respiratory distress.      Comments: Decreased lung sounds however patient is not in acute respiratory distress.  Able to speak in full sentences.  O2 saturation 91 to 93% on 6 L nasal cannula.  Abdominal:      General: Bowel sounds are normal. There is no distension.      Palpations: Abdomen is soft.      Tenderness: There is no abdominal tenderness.   Musculoskeletal:      Cervical back: No rigidity.      Right lower leg: No edema.      Left lower leg: No edema.   Neurological:      Mental Status: She is alert and oriented to person,  place, and time. Mental status is at baseline.   Psychiatric:         Mood and Affect: Mood normal.         Behavior: Behavior normal.          Additional Data:     Labs:  Results from last 7 days   Lab Units 01/11/24  0502 01/06/24 0437 01/05/24  1820   WBC Thousand/uL 6.75   < > 4.76   HEMOGLOBIN g/dL 12.1   < > 14.8   HEMATOCRIT % 36.7   < > 43.3   PLATELETS Thousands/uL 192   < > 161   NEUTROS PCT %  --   --  70   LYMPHS PCT %  --   --  17   MONOS PCT %  --   --  13*   EOS PCT %  --   --  0    < > = values in this interval not displayed.     Results from last 7 days   Lab Units 01/11/24  0502 01/10/24  0452 01/06/24 0437 01/05/24  1902   SODIUM mmol/L 136 131*   < > 117*   POTASSIUM mmol/L 3.5 3.4*   < > 4.8   CHLORIDE mmol/L 89* 88*   < > 77*   CO2 mmol/L >45* 37*   < > 33*   BUN mg/dL 12 9   < > 14   CREATININE mg/dL 0.30* 0.26*   < > 0.36*   ANION GAP mmol/L  --  6   < > 7   CALCIUM mg/dL 8.4 8.4   < > 8.1*   ALBUMIN g/dL  --   --   --  4.0   TOTAL BILIRUBIN mg/dL  --   --   --  0.39   ALK PHOS U/L  --   --   --  50   ALT U/L  --   --   --  25   AST U/L  --   --   --  53*   GLUCOSE RANDOM mg/dL 92 112   < > 117    < > = values in this interval not displayed.     Results from last 7 days   Lab Units 01/11/24  0502   INR  2.38*             Results from last 7 days   Lab Units 01/07/24  0546 01/06/24 0437 01/05/24  1820   LACTIC ACID mmol/L  --   --  1.2   PROCALCITONIN ng/ml 0.05 0.13  --        Lines/Drains:  Invasive Devices       Peripheral Intravenous Line  Duration             Peripheral IV 01/08/24 Left;Ventral (anterior) Forearm 3 days    Peripheral IV 01/09/24 Right;Ventral (anterior) Forearm 1 day                          Recent Cultures (last 7 days):   Results from last 7 days   Lab Units 01/07/24  1415 01/07/24  1411 01/05/24  1824 01/05/24  1820   BLOOD CULTURE  No Growth at 72 hrs. No Growth at 72 hrs. Micrococcus luteus* No Growth After 5 Days.   GRAM STAIN RESULT   --   --  Gram positive  cocci in clusters*  --        Last 24 Hours Medication List:   Current Facility-Administered Medications   Medication Dose Route Frequency Provider Last Rate    acetaminophen  650 mg Oral Q6H PRN Mathew Rust MD      albuterol  2 puff Inhalation Q4H PRN Mathew Rust MD      albuterol  2.5 mg Nebulization Q6H PRN Xander MICHELE MD      budesonide  0.5 mg Nebulization Q12H Bautista Haider DO      dexamethasone  6 mg Intravenous Q24H Mathew Rust MD      formoterol  20 mcg Nebulization Q12H Xander MICHELE MD      heparin (porcine)  3-20 Units/kg/hr (Order-Specific) Intravenous Titrated Bautista Haider DO 14 Units/kg/hr (01/11/24 0600)    heparin (porcine)  1,200 Units Intravenous Q6H PRN Bautista Haider DO      heparin (porcine)  2,400 Units Intravenous Q6H PRN Bautista Haider DO      ipratropium  0.5 mg Nebulization TID Bautista Haider DO      levalbuterol  1.25 mg Nebulization TID Bautista Haider DO      nicotine  1 patch Transdermal Daily Mathew Rust MD      ondansetron  4 mg Intravenous Q6H PRN Mathew Rust MD      sodium chloride  2 spray Each Nare Q1H PRN Nilda Harris PA-C      warfarin  5 mg Oral Daily (warfarin) Bautista Haider DO          Today, Patient Was Seen By: Xander Palafox MD    **Please Note: This note may have been constructed using a voice recognition system.**

## 2024-01-11 NOTE — ASSESSMENT & PLAN NOTE
Acute respiratory failure with hypoxia secondary to COVID-19 infection superimposed on baseline COPD  Currently over saturation 93-95 % on 4L nasal cannula.  Continue Pulmicort, Xopenex,  perforomist  Continue IV Decadron.  Continue to monitor and titrate as needed to maintain oxygen saturation greater than 88%  See treatment plan below for COVID  Pulmonology recommendation appreciated.

## 2024-01-11 NOTE — ASSESSMENT & PLAN NOTE
Malnutrition Findings:   Adult Malnutrition type: Chronic illness  Adult Degree of Malnutrition: Other severe protein calorie malnutrition  Malnutrition Characteristics: Weight loss, Inadequate energy                  360 Statement: Related to chronic illness (COPD, MS) as evidenced by consuming < 75% of energy energy requirement for > 1 month and reported 24% weight loss over the past year. Treated with diet and supplements.    BMI Findings:  Adult BMI Classifications: Underweight < 18.5        Body mass index is 13.46 kg/m².

## 2024-01-11 NOTE — PROGRESS NOTES
"Progress Note - Pulmonary   Loretta Partida 58 y.o. female MRN: [id number]  Unit/Bed#: -01 Encounter: [id number]    Assessment:  Acute hypoxemic respiratory failure  COPD unknown severity with acute exacerbation  Pulmonary emphysema  COVID-positive infection  Active tobacco use    Plan:  Acute hypoxemic respiratory failure currently on 5 L of oxygen by nasal cannula saturating around 91 to 92%  Titrate down as able to maintain oxygen saturation greater than 89%  COPD unknown severity with acute exacerbation and I believe she is at baseline probably might be requiring oxygen she did not seek any medical attention for years given the emphysematous changes and chronic dyspnea  The echocardiogram does not demonstrate any evidence of pulmonary hypertension  Continue with Xopenex and Atrovent around-the-clock  Continue with budesonide and Perforomist via the nebulizer twice daily  On Decadron for the COVID protocol to continue can be switched to prednisone at the time of discharge  Inflammatory markers have all been negative  The D-dimer is normal.  She candidate for CT lung screening outpatient  Chest x-ray here no evidence of any pneumonia significant evidence of hyperinflation   Smoking cessation  Outpatient pulmonary follow-up  Assessment for home oxygen need at the time of discharge  Patient keeps saying she wants to go home, discussed given still requiring around 5 L feels slightly better tomorrow, comes down to 4 L also oxygen saturations requirement on exercise have to be assessed before we order the oxygen set up, understands and verbalizes  Discussed with Slim attending    Chief Complaint:   Acute hypoxemic respiratory failure    Subjective:   Feeling slightly better than yesterday.    Objective:     Vitals: Blood pressure 135/74, pulse 105, temperature 98.9 °F (37.2 °C), resp. rate 18, height 5' 3\" (1.6 m), weight 34.5 kg (76 lb), SpO2 93%.,Body mass index is 13.46 kg/m².      Intake/Output Summary (Last 24 " hours) at 1/11/2024 1457  Last data filed at 1/11/2024 1002  Gross per 24 hour   Intake 120 ml   Output --   Net 120 ml       Invasive Devices       Peripheral Intravenous Line  Duration             Peripheral IV 01/08/24 Left;Ventral (anterior) Forearm 3 days    Peripheral IV 01/09/24 Right;Ventral (anterior) Forearm 1 day                    Physical Exam: Currently sitting up on the bed in no acute respiratory distress  Eyes anicteric sclera, conjunctiva is clear  ENT nares is patent, no evidence of any discharge  Lungs diminished breath sounds bilaterally no rhonchi  Heart first and second heart sounds are heard no murmur or gallop is heard  Abdomen soft nontender bowel sounds are present  Extremities no pedal edema  CNS awake alert oriented x 3.    Labs: CBC:   Lab Results   Component Value Date    WBC 6.75 01/11/2024    HGB 12.1 01/11/2024    HCT 36.7 01/11/2024     (H) 01/11/2024     01/11/2024    RBC 3.66 (L) 01/11/2024    MCH 33.1 01/11/2024    MCHC 33.0 01/11/2024    RDW 12.2 01/11/2024    MPV 9.1 01/11/2024     Imaging and other studies: I have personally reviewed pertinent films in PACS

## 2024-01-11 NOTE — ASSESSMENT & PLAN NOTE
Severe hyponatremia on admission at 117; probably secondary to SIADH  Now improving with fluid restriction  Currently sodium 136  Continue to monitor daily BMP  Nephrology recommendation appreciated.

## 2024-01-11 NOTE — RESPIRATORY THERAPY NOTE
"RT Protocol Note  Loretta Partida 58 y.o. female MRN: [id number]  Unit/Bed#: -01 Encounter: [id number]    Assessment    Principal Problem:    Acute respiratory failure with hypoxia (HCC)  Active Problems:    COVID-19    COPD exacerbation (HCC)    Multiple sclerosis (HCC)    Thoracic outlet syndrome    Hyponatremia    Severe protein-calorie malnutrition (HCC)    Positive blood culture    Hypomagnesemia    Physical Exam:   Assessment Type: Assess only  General Appearance: Alert, Awake  Respiratory Pattern: Normal  Chest Assessment: Chest expansion symmetrical  Bilateral Breath Sounds: Diminished  O2 Device: nc    Vitals:  Blood pressure 124/69, pulse 97, temperature 98.6 °F (37 °C), resp. rate 17, height 5' 3\" (1.6 m), weight 34.5 kg (76 lb), SpO2 95%.    Results from last 7 days   Lab Units 01/11/24  0757   PH ART  7.422   PCO2 ART mm Hg 66.3*   PO2 ART mm Hg 55.7*   HCO3 ART mmol/L 42.2*   BASE EXC ART mmol/L 14.6   O2 CONTENT ART mL/dL 17.2   O2 HGB, ARTERIAL % 89.0*   SERENE TEST  Yes       O2 Device: nc     Plan    Respiratory Plan: Mild Distress pathway        Resp Comments: will cont w TID txs   "

## 2024-01-11 NOTE — CASE MANAGEMENT
Case Management Discharge Planning Note    Patient name Loretta Partida  Location /-01 MRN [id number]  : 1965 Date 2024       Current Admission Date: 2024  Current Admission Diagnosis:Acute respiratory failure with hypoxia (HCC)   Patient Active Problem List    Diagnosis Date Noted    Severe protein-calorie malnutrition (HCC) 2024    Positive blood culture 2024    Hypomagnesemia 2024    Acute respiratory failure with hypoxia (HCC) 2024    COVID-19 2024    COPD exacerbation (HCC) 2024    Multiple sclerosis (HCC) 2024    Thoracic outlet syndrome 2024    Hyponatremia 2024      LOS (days): 6  Geometric Mean LOS (GMLOS) (days):   Days to GMLOS:     OBJECTIVE:  Risk of Unplanned Readmission Score: 12.58         Current admission status: Inpatient   Preferred Pharmacy:   [facility]/pharmacy #[id number] [address] [address]  Phone: [phone] Fax: [phone]    Primary Care Provider: Jordi Morse MD    Primary Insurance: BLUE CROSS  Secondary Insurance:     DISCHARGE DETAILS:     provided cm with In network suppliers for O2 and Nebulizer    Associated health care systems- [id number]    [facility] surgical supply [place] - [phone]    [facility] [phone]    CM will submit for both together once O2 note is in from RT.

## 2024-01-11 NOTE — ASSESSMENT & PLAN NOTE
INR now therapeutic.  Discontinue IV heparin drip.  Continue Coumadin 5 mg daily with daily INR checks

## 2024-01-11 NOTE — ASSESSMENT & PLAN NOTE
1 out of 2 blood cultures on admission growing Micrococcus luteus  This appears consistent with contamination  Will discontinue vancomycin and monitor off antibiotic therapy  Repeat culture without growth in 72 hours.

## 2024-01-11 NOTE — RESPIRATORY THERAPY NOTE
RT Protocol Note  Loretta Partida 58 y.o. female MRN: [id number]  Unit/Bed#: -01 Encounter: [id number]    Assessment    Principal Problem:    Acute respiratory failure with hypoxia (HCC)  Active Problems:    COVID-19    COPD (chronic obstructive pulmonary disease) (HCC)    Multiple sclerosis (HCC)    Thoracic outlet syndrome    Hyponatremia    Severe protein-calorie malnutrition (HCC)    Positive blood culture    Hypomagnesemia      Home Pulmonary Medications:     01/10/24 2023   Respiratory Protocol   Protocol Initiated? No   Protocol Selection Respiratory   Language Barrier? No   Medical & Social History Reviewed? Yes   Diagnostic Studies Reviewed? Yes   Physical Assessment Performed? Yes   Respiratory Plan Mild Distress pathway   Respiratory Assessment   Assessment Type During-treatment   General Appearance Alert;Awake   Respiratory Pattern Normal   Chest Assessment Chest expansion symmetrical   Bilateral Breath Sounds Diminished   Cough None   Resp Comments Will continue with current tx plan   O2 Device NC   Cough Description   Sputum Amount None   Additional Assessments   Pulse 102   Respirations 18   SpO2 98 %            Past Medical History:   Diagnosis Date    COPD (chronic obstructive pulmonary disease) (HCC) 1/5/2024    Multiple sclerosis (HCC) 1/5/2024    Osteoporosis     Thoracic outlet syndrome 1/5/2024     Social History     Socioeconomic History    Marital status: /Civil Union     Spouse name: None    Number of children: None    Years of education: None    Highest education level: None   Occupational History    None   Tobacco Use    Smoking status: Every Day     Current packs/day: 0.50     Average packs/day: 0.5 packs/day for 30.0 years (15.0 ttl pk-yrs)     Types: Cigarettes     Start date: 1/5/1994    Smokeless tobacco: Never   Substance and Sexual Activity    Alcohol use: Not Currently    Drug use: Never    Sexual activity: None   Other Topics Concern    None   Social History Narrative     "None     Social Determinants of Health     Financial Resource Strain: Not on file   Food Insecurity: No Food Insecurity (1/9/2024)    Hunger Vital Sign     Worried About Running Out of Food in the Last Year: Never true     Ran Out of Food in the Last Year: Never true   Transportation Needs: No Transportation Needs (1/9/2024)    PRAPARE - Transportation     Lack of Transportation (Medical): No     Lack of Transportation (Non-Medical): No   Physical Activity: Not on file   Stress: Not on file   Social Connections: Not on file   Intimate Partner Violence: Not on file   Housing Stability: Low Risk  (1/9/2024)    Housing Stability Vital Sign     Unable to Pay for Housing in the Last Year: No     Number of Places Lived in the Last Year: 1     Unstable Housing in the Last Year: No       Subjective         Objective    Physical Exam:   Assessment Type: During-treatment  General Appearance: Alert, Awake  Respiratory Pattern: Normal  Chest Assessment: Chest expansion symmetrical  Bilateral Breath Sounds: Diminished  Cough: None  O2 Device: NC    Vitals:  Blood pressure 131/80, pulse 102, temperature 98.6 °F (37 °C), resp. rate 18, height 5' 3\" (1.6 m), weight 34.7 kg (76 lb 8 oz), SpO2 98%.          Imaging and other studies: I have personally reviewed pertinent reports.      O2 Device: NC     Plan    Respiratory Plan: Mild Distress pathway        Resp Comments: Will continue with current tx plan   "

## 2024-01-12 LAB
ANION GAP SERPL CALCULATED.3IONS-SCNC: 2 MMOL/L
BUN SERPL-MCNC: 10 MG/DL (ref 5–25)
CALCIUM SERPL-MCNC: 8.4 MG/DL (ref 8.4–10.2)
CHLORIDE SERPL-SCNC: 87 MMOL/L (ref 96–108)
CO2 SERPL-SCNC: 43 MMOL/L (ref 21–32)
CREAT SERPL-MCNC: 0.29 MG/DL (ref 0.6–1.3)
DME PARACHUTE DELIVERY DATE REQUESTED: NORMAL
DME PARACHUTE DELIVERY DATE REQUESTED: NORMAL
DME PARACHUTE DELIVERY NOTE: NORMAL
DME PARACHUTE ITEM DESCRIPTION: NORMAL
DME PARACHUTE ORDER STATUS: NORMAL
DME PARACHUTE ORDER STATUS: NORMAL
DME PARACHUTE SUPPLIER NAME: NORMAL
DME PARACHUTE SUPPLIER NAME: NORMAL
DME PARACHUTE SUPPLIER PHONE: NORMAL
DME PARACHUTE SUPPLIER PHONE: NORMAL
ERYTHROCYTE [DISTWIDTH] IN BLOOD BY AUTOMATED COUNT: 12.3 % (ref 11.6–15.1)
GFR SERPL CREATININE-BSD FRML MDRD: 127 ML/MIN/1.73SQ M
GLUCOSE SERPL-MCNC: 90 MG/DL (ref 65–140)
HCT VFR BLD AUTO: 35.4 % (ref 34.8–46.1)
HGB BLD-MCNC: 11.9 G/DL (ref 11.5–15.4)
MAGNESIUM SERPL-MCNC: 1.3 MG/DL (ref 1.9–2.7)
MCH RBC QN AUTO: 33.4 PG (ref 26.8–34.3)
MCHC RBC AUTO-ENTMCNC: 33.6 G/DL (ref 31.4–37.4)
MCV RBC AUTO: 99 FL (ref 82–98)
PLATELET # BLD AUTO: 191 THOUSANDS/UL (ref 149–390)
PMV BLD AUTO: 9.5 FL (ref 8.9–12.7)
POTASSIUM SERPL-SCNC: 3.8 MMOL/L (ref 3.5–5.3)
RBC # BLD AUTO: 3.56 MILLION/UL (ref 3.81–5.12)
SODIUM SERPL-SCNC: 132 MMOL/L (ref 135–147)
WBC # BLD AUTO: 6.01 THOUSAND/UL (ref 4.31–10.16)

## 2024-01-12 PROCEDURE — 94761 N-INVAS EAR/PLS OXIMETRY MLT: CPT

## 2024-01-12 PROCEDURE — 94760 N-INVAS EAR/PLS OXIMETRY 1: CPT

## 2024-01-12 PROCEDURE — 99232 SBSQ HOSP IP/OBS MODERATE 35: CPT | Performed by: INTERNAL MEDICINE

## 2024-01-12 PROCEDURE — 85027 COMPLETE CBC AUTOMATED: CPT | Performed by: STUDENT IN AN ORGANIZED HEALTH CARE EDUCATION/TRAINING PROGRAM

## 2024-01-12 PROCEDURE — 83735 ASSAY OF MAGNESIUM: CPT | Performed by: STUDENT IN AN ORGANIZED HEALTH CARE EDUCATION/TRAINING PROGRAM

## 2024-01-12 PROCEDURE — 94664 DEMO&/EVAL PT USE INHALER: CPT

## 2024-01-12 PROCEDURE — 80048 BASIC METABOLIC PNL TOTAL CA: CPT | Performed by: STUDENT IN AN ORGANIZED HEALTH CARE EDUCATION/TRAINING PROGRAM

## 2024-01-12 PROCEDURE — 94640 AIRWAY INHALATION TREATMENT: CPT

## 2024-01-12 PROCEDURE — 99232 SBSQ HOSP IP/OBS MODERATE 35: CPT | Performed by: STUDENT IN AN ORGANIZED HEALTH CARE EDUCATION/TRAINING PROGRAM

## 2024-01-12 RX ORDER — OXYCODONE HYDROCHLORIDE AND ACETAMINOPHEN 5; 325 MG/1; MG/1
1 TABLET ORAL EVERY 6 HOURS PRN
Status: DISCONTINUED | OUTPATIENT
Start: 2024-01-12 | End: 2024-01-13 | Stop reason: HOSPADM

## 2024-01-12 RX ORDER — DIAZEPAM 5 MG/1
5 TABLET ORAL EVERY 6 HOURS PRN
Status: DISCONTINUED | OUTPATIENT
Start: 2024-01-12 | End: 2024-01-13 | Stop reason: HOSPADM

## 2024-01-12 RX ORDER — MAGNESIUM SULFATE HEPTAHYDRATE 40 MG/ML
4 INJECTION, SOLUTION INTRAVENOUS ONCE
Qty: 100 ML | Refills: 0 | Status: COMPLETED | OUTPATIENT
Start: 2024-01-12 | End: 2024-01-13

## 2024-01-12 RX ADMIN — LEVALBUTEROL HYDROCHLORIDE 1.25 MG: 1.25 SOLUTION RESPIRATORY (INHALATION) at 13:40

## 2024-01-12 RX ADMIN — LEVALBUTEROL HYDROCHLORIDE 1.25 MG: 1.25 SOLUTION RESPIRATORY (INHALATION) at 08:08

## 2024-01-12 RX ADMIN — DEXAMETHASONE SODIUM PHOSPHATE 6 MG: 10 INJECTION, SOLUTION INTRAMUSCULAR; INTRAVENOUS at 05:58

## 2024-01-12 RX ADMIN — WARFARIN SODIUM 5 MG: 5 TABLET ORAL at 18:32

## 2024-01-12 RX ADMIN — IPRATROPIUM BROMIDE 0.5 MG: 0.5 SOLUTION RESPIRATORY (INHALATION) at 08:08

## 2024-01-12 RX ADMIN — OXYCODONE HYDROCHLORIDE AND ACETAMINOPHEN 1 TABLET: 5; 325 TABLET ORAL at 23:15

## 2024-01-12 RX ADMIN — LEVALBUTEROL HYDROCHLORIDE 1.25 MG: 1.25 SOLUTION RESPIRATORY (INHALATION) at 20:22

## 2024-01-12 RX ADMIN — MAGNESIUM SULFATE HEPTAHYDRATE 4 G: 40 INJECTION, SOLUTION INTRAVENOUS at 10:58

## 2024-01-12 RX ADMIN — FORMOTEROL FUMARATE DIHYDRATE 20 MCG: 20 SOLUTION RESPIRATORY (INHALATION) at 08:08

## 2024-01-12 RX ADMIN — IPRATROPIUM BROMIDE 0.5 MG: 0.5 SOLUTION RESPIRATORY (INHALATION) at 13:40

## 2024-01-12 RX ADMIN — BUDESONIDE INHALATION 0.5 MG: 0.5 SUSPENSION RESPIRATORY (INHALATION) at 20:21

## 2024-01-12 RX ADMIN — IPRATROPIUM BROMIDE 0.5 MG: 0.5 SOLUTION RESPIRATORY (INHALATION) at 20:21

## 2024-01-12 RX ADMIN — FORMOTEROL FUMARATE DIHYDRATE 20 MCG: 20 SOLUTION RESPIRATORY (INHALATION) at 20:22

## 2024-01-12 RX ADMIN — DIAZEPAM 5 MG: 5 TABLET ORAL at 23:19

## 2024-01-12 RX ADMIN — ACETAMINOPHEN 650 MG: 325 TABLET, FILM COATED ORAL at 18:32

## 2024-01-12 RX ADMIN — BUDESONIDE INHALATION 0.5 MG: 0.5 SUSPENSION RESPIRATORY (INHALATION) at 08:08

## 2024-01-12 NOTE — RESPIRATORY THERAPY NOTE
RT Protocol Note  Loretta Partida 58 y.o. female MRN: [id number]  Unit/Bed#: -01 Encounter: [id number]    Assessment    Principal Problem:    Acute respiratory failure with hypoxia (HCC)  Active Problems:    COVID-19    COPD exacerbation (HCC)    Multiple sclerosis (HCC)    Thoracic outlet syndrome    Hyponatremia    Severe protein-calorie malnutrition (HCC)    Positive blood culture    Hypomagnesemia      Home Pulmonary Medications:     01/11/24 2035   Respiratory Protocol   Protocol Initiated? No   Protocol Selection Respiratory   Language Barrier? No   Medical & Social History Reviewed? Yes   Diagnostic Studies Reviewed? Yes   Physical Assessment Performed? Yes   Respiratory Plan Mild Distress pathway   Respiratory Assessment   Assessment Type During-treatment   General Appearance Alert;Awake   Respiratory Pattern Normal   Chest Assessment Chest expansion symmetrical   Bilateral Breath Sounds Diminished   Cough None   Resp Comments Will continue with current tx plan   O2 Device NC   Cough Description   Sputum Amount None   Additional Assessments   Pulse 96   Respirations 18   SpO2 95 %            Past Medical History:   Diagnosis Date    COPD (chronic obstructive pulmonary disease) (HCC) 1/5/2024    Multiple sclerosis (HCC) 1/5/2024    Osteoporosis     Thoracic outlet syndrome 1/5/2024     Social History     Socioeconomic History    Marital status: /Civil Union     Spouse name: None    Number of children: None    Years of education: None    Highest education level: None   Occupational History    None   Tobacco Use    Smoking status: Every Day     Current packs/day: 0.50     Average packs/day: 0.5 packs/day for 30.0 years (15.0 ttl pk-yrs)     Types: Cigarettes     Start date: 1/5/1994    Smokeless tobacco: Never   Substance and Sexual Activity    Alcohol use: Not Currently    Drug use: Never    Sexual activity: None   Other Topics Concern    None   Social History Narrative    None     Social Determinants  "of Health     Financial Resource Strain: Not on file   Food Insecurity: No Food Insecurity (1/9/2024)    Hunger Vital Sign     Worried About Running Out of Food in the Last Year: Never true     Ran Out of Food in the Last Year: Never true   Transportation Needs: No Transportation Needs (1/9/2024)    PRAPARE - Transportation     Lack of Transportation (Medical): No     Lack of Transportation (Non-Medical): No   Physical Activity: Not on file   Stress: Not on file   Social Connections: Not on file   Intimate Partner Violence: Not on file   Housing Stability: Low Risk  (1/9/2024)    Housing Stability Vital Sign     Unable to Pay for Housing in the Last Year: No     Number of Places Lived in the Last Year: 1     Unstable Housing in the Last Year: No       Subjective         Objective    Physical Exam:   Assessment Type: During-treatment  General Appearance: Alert, Awake  Respiratory Pattern: Normal  Chest Assessment: Chest expansion symmetrical  Bilateral Breath Sounds: Diminished  Cough: None  O2 Device: NC    Vitals:  Blood pressure 124/69, pulse 96, temperature 98.6 °F (37 °C), resp. rate 18, height 5' 3\" (1.6 m), weight 34.5 kg (76 lb), SpO2 95%.    Results from last 7 days   Lab Units 01/11/24  0757   PH ART  7.422   PCO2 ART mm Hg 66.3*   PO2 ART mm Hg 55.7*   HCO3 ART mmol/L 42.2*   BASE EXC ART mmol/L 14.6   O2 CONTENT ART mL/dL 17.2   O2 HGB, ARTERIAL % 89.0*   ESRENE TEST  Yes       Imaging and other studies: I have personally reviewed pertinent reports.      O2 Device: NC     Plan    Respiratory Plan: Mild Distress pathway        Resp Comments: Will continue with current tx plan   "

## 2024-01-12 NOTE — PLAN OF CARE
Problem: PAIN - ADULT  Goal: Verbalizes/displays adequate comfort level or baseline comfort level  Description: Interventions:  - Encourage patient to monitor pain and request assistance  - Assess pain using appropriate pain scale  - Administer analgesics based on type and severity of pain and evaluate response  - Implement non-pharmacological measures as appropriate and evaluate response  - Consider cultural and social influences on pain and pain management  - Notify physician/advanced practitioner if interventions unsuccessful or patient reports new pain  Outcome: Progressing     Problem: INFECTION - ADULT  Goal: Absence or prevention of progression during hospitalization  Description: INTERVENTIONS:  - Assess and monitor for signs and symptoms of infection  - Monitor lab/diagnostic results  - Monitor all insertion sites, i.e. indwelling lines, tubes, and drains  - Monitor endotracheal if appropriate and nasal secretions for changes in amount and color  - [place] appropriate cooling/warming therapies per order  - Administer medications as ordered  - Instruct and encourage patient and family to use good hand hygiene technique  - Identify and instruct in appropriate isolation precautions for identified infection/condition  Outcome: Progressing

## 2024-01-12 NOTE — CASE MANAGEMENT
Case Management Discharge Planning Note    Patient name Loretta Partida  Location /-01 MRN [id number]  : 1965 Date 2024       Current Admission Date: 2024  Current Admission Diagnosis:Acute respiratory failure with hypoxia (HCC)   Patient Active Problem List    Diagnosis Date Noted    Severe protein-calorie malnutrition (HCC) 2024    Positive blood culture 2024    Hypomagnesemia 2024    Acute respiratory failure with hypoxia (HCC) 2024    COVID-19 2024    COPD exacerbation (HCC) 2024    Multiple sclerosis (HCC) 2024    Thoracic outlet syndrome 2024    Hyponatremia 2024      LOS (days): 7  Geometric Mean LOS (GMLOS) (days):   Days to GMLOS:     OBJECTIVE:  Risk of Unplanned Readmission Score: 12.91         Current admission status: Inpatient   Preferred Pharmacy:   [facility]/pharmacy #[id number] [address] [address]  Phone: [phone] Fax: [phone]    Primary Care Provider: Jordi Morse MD    Primary Insurance: BLUE CROSS  Secondary Insurance:     DISCHARGE DETAILS:    Discharge planning discussed with:: Patient's   Freedom of Choice: Yes  Comments - Freedom of Choice: CM called patient's  to review DC needs. CM reported that [facility] ran patient's insurance, and it only covers 50%. Patient's  reported that he is annoyed and angry by this, because he's been on the phone all day with the insurance and confirmed that this is not the case. He asked if CM will send referral to [facility] in [place] for review. They can be reached at P: [phone] and F: [phone]. CM reported that they copay may be the same across the companies, but CM agreeable to try. Patient's  reported that patient will need to leave today either way, because she is in pain and needs to come home. CM reported that it is her right to sign out, but  it will be AMA if she signs out prior to the O2 being arranged. Patient's  reported that this is okay, and he will take patient to another provider that she wants to see. CM to call patient's  back once she receives a response from [employer].  CM contacted family/caregiver?: Yes  Were Treatment Team discharge recommendations reviewed with patient/caregiver?: Yes  Did patient/caregiver verbalize understanding of patient care needs?: Yes  Were patient/caregiver advised of the risks associated with not following Treatment Team discharge recommendations?: Yes    Contacts  Patient Contacts: Artemio  Relationship to Patient:: Family  Contact Method: Phone  Phone Number: [phone]  Reason/Outcome: Continuity of Care, Discharge Planning    Other Referral/Resources/Interventions Provided:  Interventions: DME  Referral Comments: CM sent a referral to [facility] [employer] [facility] via [place]. Response pending.

## 2024-01-12 NOTE — PROGRESS NOTES
"Progress Note - Pulmonary   Loretta Partida 58 y.o. female MRN: [id number]  Unit/Bed#: -01 Encounter: [id number]    Assessment:  Acute hypoxemic respiratory failure improving  COPD unknown severity with acute exacerbation  Pulmonary emphysema  COVID-positive infection  Active tobacco use    Plan:  Acute hypoxemic respiratory failure currently on 3 L of oxygen by nasal cannula saturating well  Titrate down as able to maintain oxygen saturation greater than 89%  COPD unknown severity with acute exacerbation and I believe she at baseline probably might be requiring oxygen given her significant emphysematous changes and chronic dyspnea  Echocardiogram does not demonstrate any evidence of pulmonary hypertension  Continue with Xopenex and Atrovent around-the-clock  Continue with budesonide and Perforomist via the nebulizer twice daily  At the time of discharge can be switched to her home medications Advair and Spiriva and albuterol MDI that she has can be sent home with a nebulizer with albuterol as well to be used 4 times daily as needed  On Decadron for the COVID protocol to continue can be switched to prednisone at the time of discharge  Inflammatory markers have all been negative  Candidate for CT lung screening outpatient  Smoking cessation  Outpatient pulmonary follow-up  Assessment for home oxygen need at time of discharge  Will follow-up on Monday if still in the hospital please call us with any questions over the weekend      Chief Complaint:   Acute hypoxemic respiratory failure    Subjective:   Feeling better with the breathing says she is doing okay    Objective:     Vitals: Blood pressure 132/72, pulse 93, temperature 97.9 °F (36.6 °C), resp. rate 18, height 5' 3\" (1.6 m), weight 34.5 kg (76 lb), SpO2 98%.,Body mass index is 13.46 kg/m².      Intake/Output Summary (Last 24 hours) at 1/12/2024 0659  Last data filed at 1/12/2024 0444  Gross per 24 hour   Intake 480 ml   Output 1600 ml   Net -1120 ml "       Invasive Devices       Peripheral Intravenous Line  Duration             Peripheral IV 01/08/24 Left;Ventral (anterior) Forearm 4 days    Peripheral IV 01/09/24 Right;Ventral (anterior) Forearm 2 days                    Physical Exam: Currently in bed in no acute respiratory distress  Eyes anicteric sclera, conjunctiva is clear  ENT nares is patent, no evidence of any discharge  Lungs diminished breath sounds bilaterally no rhonchi  Heart first and second heart sounds are heard no murmur or gallop is heard  Extremities no pedal edema  CNS awake alert oriented x 3    Labs: CBC:   Lab Results   Component Value Date    WBC 6.01 01/12/2024    HGB 11.9 01/12/2024    HCT 35.4 01/12/2024    MCV 99 (H) 01/12/2024     01/12/2024    RBC 3.56 (L) 01/12/2024    MCH 33.4 01/12/2024    MCHC 33.6 01/12/2024    RDW 12.3 01/12/2024    MPV 9.5 01/12/2024     Imaging and other studies: I have personally reviewed pertinent films in PACS

## 2024-01-12 NOTE — ASSESSMENT & PLAN NOTE
Continue home inhaler regimen  O2 demand improving slowly day by day.  Currently O2 saturation 93 to 95% on 3 L nasal cannula.  Continue Pulmicort, Xopenex, added Perforomist, continue IV Decadron.  Pulmonary recommendation appreciated.

## 2024-01-12 NOTE — CASE MANAGEMENT
Case Management Discharge Planning Note    Patient name Loretta Partida  Location /-01 MRN [id number]  : 1965 Date 2024       Current Admission Date: 2024  Current Admission Diagnosis:Acute respiratory failure with hypoxia (HCC)   Patient Active Problem List    Diagnosis Date Noted    Severe protein-calorie malnutrition (HCC) 2024    Positive blood culture 2024    Hypomagnesemia 2024    Acute respiratory failure with hypoxia (HCC) 2024    COVID-19 2024    COPD exacerbation (HCC) 2024    Multiple sclerosis (HCC) 2024    Thoracic outlet syndrome 2024    Hyponatremia 2024      LOS (days): 7  Geometric Mean LOS (GMLOS) (days):   Days to GMLOS:     OBJECTIVE:  Risk of Unplanned Readmission Score: 12.91         Current admission status: Inpatient   Preferred Pharmacy:   [facility]/pharmacy #[id number] [address] [address]  Phone: [phone] Fax: [phone]    Primary Care Provider: Jordi Morse MD    Primary Insurance: BLUE CROSS  Secondary Insurance:     DISCHARGE DETAILS:    CM spoke with patients  who expressed his frustrations with the care his wife has been receiving.  was upset that he provided cm with oxygen companies prior to today and patient still does not have home oxygen. CM explained to  that cm was unable to work on O2 until RT note was in. CM informed him that home O2 evaluation was this morning, which is why cm was able to start working on it with clinical coordinator.  starting yelling at cm stating cm was leaving patient without oxygen and leaving patient in pain and what cm was going to do about the pain. CM informed  that cm is unable to medically treat patient but that cm would inform slim provider.  stated wife is going to leave AMA. CM informed  that it is a huge safety concern for patient  to leave without oxygen.  stated he would be getting a  against the hospital as well as medical device companies.     CM informed assigned SLIM provider of pain.       Artemio Helga (Spouse)   [address] [place], [place] [address]NELI MANLEY   [facility] [address]      [phone] (H)   [phone]

## 2024-01-12 NOTE — PROGRESS NOTES
[facility]  Progress Note  Name: Loretta Partida I  MRN: [id number]  Unit/Bed#: -01 I Date of Admission: 1/5/2024   Date of Service: 1/12/2024 I Hospital Day: 7    Assessment/Plan   * Acute respiratory failure with hypoxia (HCC)  Assessment & Plan  Acute respiratory failure with hypoxia secondary to COVID-19 infection superimposed on baseline COPD  Currently over saturation 93-95 % on 3L nasal cannula.  Continue Pulmicort, Xopenex,  perforomist  Continue IV Decadron.  Plan to transition to p.o. prednisone at the time of the discharge per pulmonology.  Goal is to discharge home today with home oxygen however appears to have difficulty getting home oxygen approved since insurance only covering 50% and patient/ not agreeing to pay for 50% cost as per case management.  Called  to discuss her care however did not answer his phone around 5:55pm.  See treatment plan below for COVID  Pulmonology recommendation appreciated.    Hypomagnesemia  Assessment & Plan  Continue supplement electrolytes as needed.    Positive blood culture  Assessment & Plan  1 out of 2 blood cultures on admission growing Micrococcus luteus  This appears consistent with contamination  Will discontinue vancomycin and monitor off antibiotic therapy  Repeat culture without growth in 72 hours.    Severe protein-calorie malnutrition (HCC)  Assessment & Plan  Malnutrition Findings:   Adult Malnutrition type: Chronic illness  Adult Degree of Malnutrition: Other severe protein calorie malnutrition  Malnutrition Characteristics: Weight loss, Inadequate energy                  360 Statement: Related to chronic illness (COPD, MS) as evidenced by consuming < 75% of energy energy requirement for > 1 month and reported 24% weight loss over the past year. Treated with diet and supplements.    BMI Findings:  Adult BMI Classifications: Underweight < 18.5        Body mass index is 13.46 kg/m².       Hyponatremia  Assessment &  Plan  Severe hyponatremia on admission at 117; probably secondary to SIADH  Now improving with fluid restriction  Currently sodium 132  Continue to monitor daily BMP  Nephrology recommendation appreciated.    Thoracic outlet syndrome  Assessment & Plan  INR now therapeutic.  Discontinue IV heparin drip.  Continue Coumadin 5 mg daily with daily INR checks    Multiple sclerosis (HCC)  Assessment & Plan   patient is on some pain medications and Valium as needed    COPD exacerbation (HCC)  Assessment & Plan  Continue home inhaler regimen  O2 demand improving slowly day by day.  Currently O2 saturation 93 to 95% on 3 L nasal cannula.  Continue Pulmicort, Xopenex, added Perforomist, continue IV Decadron.  Pulmonary recommendation appreciated.      COVID-19  Assessment & Plan  Patient presented with progressive shortness of breath and dyspnea and found to be positive for COVID in the emergency room  This is superimposed on baseline COPD, active smoking    Continue IV remdesivir x 5 days  IV Decadron 6 mg daily x 10 days  Insert markers are within normal limits, D-dimer normal limit.  INR currently therapeutic   Procalcitonin negative x 2; continue to monitor off antibiotics  Plan is to discharge her home with home oxygen however difficulty getting home oxygen approved as per case management.                 VTE Pharmacologic Prophylaxis: VTE Score: 5 Moderate Risk (Score 3-4) - Pharmacological DVT Prophylaxis Ordered: warfarin (Coumadin).    Mobility:   Basic Mobility Inpatient Raw Score: 17  -[facility] Goal: 5: Stand one or more mins  -[facility] Achieved: 5: Stand (1 or more minutes)      Patient Centered Rounds: I performed bedside rounds with nursing staff today.   Discussions with Specialists or Other Care Team Provider: Pulmonology    Education and Discussions with Family / Patient: Attempted to update  () via phone. Unable to contact.    Total Time Spent on Date of Encounter in care of patient: 25 mins.  This time was spent on one or more of the following: performing physical exam; counseling and coordination of care; obtaining or reviewing history; documenting in the medical record; reviewing/ordering tests, medications or procedures; communicating with other healthcare professionals and discussing with patient's family/caregivers.    Current Length of Stay: 7 day(s)  Current Patient Status: Inpatient   Certification Statement: The patient will continue to require additional inpatient hospital stay due to difficulty with arranging home oxygen as per case management.  Discharge Plan:  Patient is hemodynamically stable for discharge however difficulty getting home oxygen arranged as per case management.    Code Status: Level 1 - Full Code    Subjective:   Seen during a.m. rounds.  Patient appears comfortable and not in distress.  Adamantly requesting to go home today.  O2 saturation currently 93 to 95% on 3 L nasal cannula which has significantly improved compared to earlier.  Patient was assessed for home oxygen and does qualify for home O2 however it has been having difficulty arranging home oxygen due to insurance issue as per case management reports.  Seems like insurance company only covering 50% of the cost and  reportedly did not agree to pay for remaining 50% as per case management.  I had called  to discuss her care and discharge planning however notable to get hold of him.  No other events reported.  Patient is unhappy about not being able to go home.  Does not express any other complaint at this time her wish to go home.    Objective:     Vitals:   Temp (24hrs), Av.2 °F (36.8 °C), Min:97.9 °F (36.6 °C), Max:98.3 °F (36.8 °C)    Temp:  [97.9 °F (36.6 °C)-98.3 °F (36.8 °C)] 97.9 °F (36.6 °C)  HR:  [89-98] 93  Resp:  [18] 18  BP: (132-140)/(69-75) 132/72  SpO2:  [93 %-98 %] 98 %  Body mass index is 13.46 kg/m².     Input and Output Summary (last 24 hours):     Intake/Output Summary (Last 24  hours) at 1/12/2024 1759  Last data filed at 1/12/2024 0444  Gross per 24 hour   Intake 480 ml   Output 1600 ml   Net -1120 ml       Physical Exam:   Physical Exam  Constitutional:       Comments: Elderly female patient, appears older than stated age, cachectic, chronically ill and deconditioned, acutely nontoxic appearing.   HENT:      Head: Normocephalic and atraumatic.   Eyes:      Pupils: Pupils are equal, round, and reactive to light.   Cardiovascular:      Rate and Rhythm: Normal rate.      Pulses: Normal pulses.   Pulmonary:      Effort: Pulmonary effort is normal. No respiratory distress.      Comments: Decreased lung sounds however patient is not in acute respiratory distress.  Able to speak in full sentences.  O2 saturation 93 to 95% on 3 L nasal cannula..  Abdominal:      General: Bowel sounds are normal. There is no distension.      Palpations: Abdomen is soft.      Tenderness: There is no abdominal tenderness.   Musculoskeletal:      Cervical back: No rigidity.      Right lower leg: No edema.      Left lower leg: No edema.   Neurological:      Mental Status: She is alert and oriented to person, place, and time. Mental status is at baseline.   Psychiatric:         Mood and Affect: Mood normal.         Behavior: Behavior normal.          Additional Data:     Labs:  Results from last 7 days   Lab Units 01/12/24  0614 01/06/24  0437 01/05/24  1820   WBC Thousand/uL 6.01   < > 4.76   HEMOGLOBIN g/dL 11.9   < > 14.8   HEMATOCRIT % 35.4   < > 43.3   PLATELETS Thousands/uL 191   < > 161   NEUTROS PCT %  --   --  70   LYMPHS PCT %  --   --  17   MONOS PCT %  --   --  13*   EOS PCT %  --   --  0    < > = values in this interval not displayed.     Results from last 7 days   Lab Units 01/12/24  0614 01/06/24  0437 01/05/24  1902   SODIUM mmol/L 132*   < > 117*   POTASSIUM mmol/L 3.8   < > 4.8   CHLORIDE mmol/L 87*   < > 77*   CO2 mmol/L 43*   < > 33*   BUN mg/dL 10   < > 14   CREATININE mg/dL 0.29*   < > 0.36*    ANION GAP mmol/L 2   < > 7   CALCIUM mg/dL 8.4   < > 8.1*   ALBUMIN g/dL  --   --  4.0   TOTAL BILIRUBIN mg/dL  --   --  0.39   ALK PHOS U/L  --   --  50   ALT U/L  --   --  25   AST U/L  --   --  53*   GLUCOSE RANDOM mg/dL 90   < > 117    < > = values in this interval not displayed.     Results from last 7 days   Lab Units 01/11/24  0502   INR  2.38*             Results from last 7 days   Lab Units 01/07/24  0546 01/06/24  0437 01/05/24  1820   LACTIC ACID mmol/L  --   --  1.2   PROCALCITONIN ng/ml 0.05 0.13  --        Lines/Drains:  Invasive Devices       Peripheral Intravenous Line  Duration             Peripheral IV 01/08/24 Left;Ventral (anterior) Forearm 4 days    Peripheral IV 01/09/24 Right;Ventral (anterior) Forearm 3 days                            Recent Cultures (last 7 days):   Results from last 7 days   Lab Units 01/07/24  1415 01/07/24  1411 01/05/24  1824 01/05/24  1820   BLOOD CULTURE  No Growth After 4 Days. No Growth After 4 Days. Micrococcus luteus* No Growth After 5 Days.   GRAM STAIN RESULT   --   --  Gram positive cocci in clusters*  --        Last 24 Hours Medication List:   Current Facility-Administered Medications   Medication Dose Route Frequency Provider Last Rate    acetaminophen  650 mg Oral Q6H PRN Mathew Rust MD      albuterol  2 puff Inhalation Q4H PRN Mathew Rust MD      albuterol  2.5 mg Nebulization Q6H PRN Xander MICHELE MD      budesonide  0.5 mg Nebulization Q12H Bautista Haider DO      dexamethasone  6 mg Intravenous Q24H Mathew Rust MD      formoterol  20 mcg Nebulization Q12H Xander MICHELE MD      ipratropium  0.5 mg Nebulization TID Bautista Haider DO      levalbuterol  1.25 mg Nebulization TID Bautista Haider DO      nicotine  1 patch Transdermal Daily Mathew Rust MD      ondansetron  4 mg Intravenous Q6H PRN Mathew Rust MD      sodium chloride  2 spray Each Nare Q1H PRN Nilda Harris PA-C      warfarin   5 mg Oral Daily (warfarin) Bautista Haider,           Today, Patient Was Seen By: Xander Palafox MD    **Please Note: This note may have been constructed using a voice recognition system.**

## 2024-01-12 NOTE — CASE MANAGEMENT
"   Case Management Discharge Planning Note    Patient name Loretta Partida  Location /-01 MRN [id number]  : 1965 Date 2024       Current Admission Date: 2024  Current Admission Diagnosis:Acute respiratory failure with hypoxia (HCC)   Patient Active Problem List    Diagnosis Date Noted    Severe protein-calorie malnutrition (HCC) 2024    Positive blood culture 2024    Hypomagnesemia 2024    Acute respiratory failure with hypoxia (HCC) 2024    COVID-19 2024    COPD exacerbation (HCC) 2024    Multiple sclerosis (HCC) 2024    Thoracic outlet syndrome 2024    Hyponatremia 2024      LOS (days): 7  Geometric Mean LOS (GMLOS) (days):   Days to GMLOS:     OBJECTIVE:  Risk of Unplanned Readmission Score: 12.91         Current admission status: Inpatient   Preferred Pharmacy:   [facility]/pharmacy #[id number] [address] [address]  Phone: [phone] Fax: [phone]    Primary Care Provider: Jordi Morse MD    Primary Insurance: BLUE CROSS  Secondary Insurance:     DISCHARGE DETAILS:    Other Referral/Resources/Interventions Provided:  Interventions: DME  Referral Comments: CM received the following message from Debbie in [place]: \"I have verified the insurance for Durable Medical Equipment benefits. They do not have coverage for DME, it is a hospital policy only. I placed call to spouse but had to leave a message.\" CM informed CM Risa of the same and asked her to follow up.        "

## 2024-01-12 NOTE — ASSESSMENT & PLAN NOTE
Severe hyponatremia on admission at 117; probably secondary to SIADH  Now improving with fluid restriction  Currently sodium 132  Continue to monitor daily BMP  Nephrology recommendation appreciated.

## 2024-01-12 NOTE — ASSESSMENT & PLAN NOTE
1 out of 2 blood cultures on admission growing Micrococcus luteus  This appears consistent with contamination  Will discontinue vancomycin and monitor off antibiotic therapy  Repeat culture without growth in 72 hours.   Pt discharged at this time,AOx3 at time of discharged. Pt verbalized understanding of discharge instructions. Encouraged questions, no questions at this time. Pt  ambulated to lobby with no incident. Rylee Vidales MA

## 2024-01-12 NOTE — ASSESSMENT & PLAN NOTE
Patient presented with progressive shortness of breath and dyspnea and found to be positive for COVID in the emergency room  This is superimposed on baseline COPD, active smoking    Continue IV remdesivir x 5 days  IV Decadron 6 mg daily x 10 days  Insert markers are within normal limits, D-dimer normal limit.  INR currently therapeutic   Procalcitonin negative x 2; continue to monitor off antibiotics  Plan is to discharge her home with home oxygen however difficulty getting home oxygen approved as per case management.

## 2024-01-12 NOTE — ASSESSMENT & PLAN NOTE
Acute respiratory failure with hypoxia secondary to COVID-19 infection superimposed on baseline COPD  Currently over saturation 93-95 % on 3L nasal cannula.  Continue Pulmicort, Xopenex,  perforomist  Continue IV Decadron.  Plan to transition to p.o. prednisone at the time of the discharge per pulmonology.  Goal is to discharge home today with home oxygen however appears to have difficulty getting home oxygen approved since insurance only covering 50% and patient/ not agreeing to pay for 50% cost as per case management.  Called  to discuss her care however did not answer his phone around 5:55pm.  See treatment plan below for COVID  Pulmonology recommendation appreciated.

## 2024-01-12 NOTE — PROGRESS NOTES
Oxygen saturation at rest on 4L=97% WB=275.  Oxygen removed at 0945. At rest saturation 97 upon ambulation in the room oxygen saturation decreased to 87 4l of oxygen reapplied saturation decreased to 88 with activity. Respiratory to be nade aware.

## 2024-01-12 NOTE — UTILIZATION REVIEW
Continued Stay Review    Date: 1/12                          Current Patient Class: IP   Current Level of Care: MS    HPI:58 y.o. female initially admitted on  1/5    Assessment/Plan:     1/12 Pulm Note   Acute hypoxemic respiratory failure currently on 3 L of oxygen by nasal cannula saturating well  Titrate down as able to maintain oxygen saturation greater than 89%. Switch to po prednisone on DC .    1/12 resp Note   O2 sat at rest 90 % . During exertion 86 % . 94 % at rest on 3l . 91 % on 3l during exertion .  Patient spo2 drops to 86% on room air during exertion.  Placed on 2LNC then increased to 3LNC to maintain an spo2 of 91% for duration of walk.     Vital Signs:   01/12/24 14:47:37 97.9 °F (36.6 °C) 93 -- 132/72 92 98 % -- -- -- --   01/12/24 1341 -- -- -- -- -- 96 % 32 3 L/min Nasal cannula --   01/12/24 0808 -- -- -- -- -- 93 % 36 4 L/min Nasal cannula --   01/12/24 07:19:23 98.3 °F (36.8 °C) 89 -- 140/75 97 97 % -- -- -- --       Pertinent Labs/Diagnostic Results:   Results from last 7 days   Lab Units 01/05/24  1836   SARS-COV-2  Positive*     Results from last 7 days   Lab Units 01/12/24  0614 01/11/24  0502 01/10/24  0452 01/09/24  0551 01/08/24  1020 01/06/24  0437 01/05/24  1820   WBC Thousand/uL 6.01 6.75 8.14 6.36 2.94*   < > 4.76   HEMOGLOBIN g/dL 11.9 12.1 13.6 12.0 12.6   < > 14.8   HEMATOCRIT % 35.4 36.7 38.9 36.4 38.5   < > 43.3   PLATELETS Thousands/uL 191 192 220 184 173   < > 161   NEUTROS ABS Thousands/µL  --   --   --   --   --   --  3.31    < > = values in this interval not displayed.         Results from last 7 days   Lab Units 01/12/24  0614 01/11/24  0502 01/10/24  0452 01/09/24  0551 01/08/24  0535 01/07/24 2026 01/07/24  0546 01/06/24  1237 01/06/24  0437   SODIUM mmol/L 132* 136 131* 132* 131* 131* 128*  129*   < > 127*   POTASSIUM mmol/L 3.8 3.5 3.4* 3.3* 4.1 3.9 3.4*  3.4*   < > 4.5   CHLORIDE mmol/L 87* 89* 88* 87* 88* 86* 88*  89*   < > 85*   CO2 mmol/L 43* >45* 37* 43* 42*  "44* 39*  39*   < > 38*   ANION GAP mmol/L 2  --  6 2 1 1 1  1   < > 4   BUN mg/dL 10 12 9 9 7 11 6  6   < > 7   CREATININE mg/dL 0.29* 0.30* 0.26* 0.22* 0.24* 0.40* 0.22*  0.22*   < > 0.26*   EGFR ml/min/1.73sq m 127 125 131 139 135 114 139  139   < > 131   CALCIUM mg/dL 8.4 8.4 8.4 7.6* 7.3* 7.5* 7.0*  6.9*   < > 7.6*   MAGNESIUM mg/dL 1.3*  --   --  1.6* 1.6*  --  1.5*  --  1.3*   PHOSPHORUS mg/dL  --   --   --   --   --   --   --   --  3.5    < > = values in this interval not displayed.     Results from last 7 days   Lab Units 01/05/24  1902   AST U/L 53*   ALT U/L 25   ALK PHOS U/L 50   TOTAL PROTEIN g/dL 6.5   ALBUMIN g/dL 4.0   TOTAL BILIRUBIN mg/dL 0.39         Results from last 7 days   Lab Units 01/12/24  0614 01/11/24  0502 01/10/24  0452 01/09/24  0551 01/08/24  0535 01/07/24  2026 01/07/24  0546 01/06/24  1803 01/06/24  1237 01/06/24  0437 01/05/24  1902   GLUCOSE RANDOM mg/dL 90 92 112 113 115 90 143*  137 138 124 108 117         No results found for: \"BETA-HYDROXYBUTYRATE\"   Results from last 7 days   Lab Units 01/11/24  0757   PH ART  7.422   PCO2 ART mm Hg 66.3*   PO2 ART mm Hg 55.7*   HCO3 ART mmol/L 42.2*   BASE EXC ART mmol/L 14.6   O2 CONTENT ART mL/dL 17.2   O2 HGB, ARTERIAL % 89.0*     Results from last 7 days   Lab Units 01/05/24  1849   PH SYD  7.332   PCO2 SYD mm Hg 63.6*   PO2 SYD mm Hg 37.6   HCO3 SYD mmol/L 32.9*   BASE EXC SYD mmol/L 4.7   O2 CONTENT SYD ml/dL 14.6   O2 HGB, VENOUS % 65.1     Results from last 7 days   Lab Units 01/05/24  2311 01/05/24  2100 01/05/24  1820   HS TNI 0HR ng/L  --   --  92*   HS TNI 2HR ng/L  --  77*  --    HSTNI D2 ng/L  --  -15  --    HS TNI 4HR ng/L 73*  --   --    HSTNI D4 ng/L -19  --   --      Results from last 7 days   Lab Units 01/10/24  1612 01/05/24  1847   D-DIMER QUANTITATIVE ug/ml FEU <0.27 <0.27     Results from last 7 days   Lab Units 01/11/24  1301 01/11/24  0502 01/10/24  2329 01/10/24  1612 01/10/24  0921 01/09/24  1423 " 01/09/24  0551   PROTIME seconds  --  26.9*  --   --  21.1*  --  20.0*   INR   --  2.38*  --   --  1.73*  --  1.62*   PTT seconds 55* 99* 108*   < > 55*   < > 57*    < > = values in this interval not displayed.         Results from last 7 days   Lab Units 01/07/24  0546 01/06/24  0437   PROCALCITONIN ng/ml 0.05 0.13     Results from last 7 days   Lab Units 01/05/24  1820   LACTIC ACID mmol/L 1.2     Results from last 7 days   Lab Units 01/05/24  1820   BNP pg/mL 262*         Results from last 7 days   Lab Units 01/10/24  1612 01/07/24  0546 01/06/24  0437   CRP mg/L 1.3 <1.0 1.4   SED RATE mm/hour 2  --   --          Results from last 7 days   Lab Units 01/06/24  0442   OSMO UR mmol/*     Results from last 7 days   Lab Units 01/06/24  0442   SODIUM UR  <10     Results from last 7 days   Lab Units 01/05/24  1836   INFLUENZA A PCR  Negative   INFLUENZA B PCR  Negative   RSV PCR  Negative         Results from last 7 days   Lab Units 01/07/24  1415 01/07/24  1411 01/05/24  1824 01/05/24  1820   BLOOD CULTURE  No Growth After 4 Days. No Growth After 4 Days. Micrococcus luteus* No Growth After 5 Days.   GRAM STAIN RESULT   --   --  Gram positive cocci in clusters*  --      Results from last 7 days   Lab Units 01/08/24  0535   VANCOMYCIN TR ug/mL 7.7*       Medications:   Scheduled Medications:  budesonide, 0.5 mg, Nebulization, Q12H  dexamethasone, 6 mg, Intravenous, Q24H  formoterol, 20 mcg, Nebulization, Q12H  ipratropium, 0.5 mg, Nebulization, TID  levalbuterol, 1.25 mg, Nebulization, TID  nicotine, 1 patch, Transdermal, Daily  warfarin, 5 mg, Oral, Daily (warfarin)      Continuous IV Infusions:     PRN Meds:  acetaminophen, 650 mg, Oral, Q6H PRN  albuterol, 2 puff, Inhalation, Q4H PRN  albuterol, 2.5 mg, Nebulization, Q6H PRN  ondansetron, 4 mg, Intravenous, Q6H PRN  sodium chloride, 2 spray, Each Nare, Q1H PRN        Discharge Plan: D     Network Utilization Review Department  ATTENTION: Please call with any  questions or concerns to [phone] and carefully listen to the prompts so that you are directed to the right person. All voicemails are confidential.   For Discharge needs, contact Care Management DC Support Team at [phone] opt. [phone]  Send all requests for admission clinical reviews, approved or denied determinations and any other requests to dedicated fax number below belonging to the [facility] where the patient is receiving treatment. List of dedicated fax numbers for the Facilities:  FACILITY NAME UR FAX NUMBER   ADMISSION DENIALS (Administrative/Medical Necessity) [phone]   DISCHARGE SUPPORT TEAM (NETWORK) [phone]   PARENT CHILD HEALTH (Maternity/NICU/Pediatrics) [phone]   [facility] [phone]   [facility] [phone]   [facility] [phone]   [facility] [phone]   [facility] [phone]   [facility] [phone]   [facility] [phone]   [facility] [phone]   [facility] [phone]   [facility] [phone]   [facility] [phone]

## 2024-01-12 NOTE — CASE MANAGEMENT
Case Management Discharge Planning Note    Patient name Loretta Partida  Location /-01 MRN [id number]  : 1965 Date 2024       Current Admission Date: 2024  Current Admission Diagnosis:Acute respiratory failure with hypoxia (HCC)   Patient Active Problem List    Diagnosis Date Noted    Severe protein-calorie malnutrition (HCC) 2024    Positive blood culture 2024    Hypomagnesemia 2024    Acute respiratory failure with hypoxia (HCC) 2024    COVID-19 2024    COPD exacerbation (HCC) 2024    Multiple sclerosis (HCC) 2024    Thoracic outlet syndrome 2024    Hyponatremia 2024      LOS (days): 7  Geometric Mean LOS (GMLOS) (days):   Days to GMLOS:     OBJECTIVE:  Risk of Unplanned Readmission Score: 12.88         Current admission status: Inpatient   Preferred Pharmacy:   [facility]/pharmacy #[id number] [address] [address]  Phone: [phone] Fax: [phone]    Primary Care Provider: Jordi Morse MD    Primary Insurance: BLUE CROSS  Secondary Insurance:     DISCHARGE DETAILS:    Other Referral/Resources/Interventions Provided:  Interventions: DME  Referral Comments: Per [facility], they do not accept patient's insurance. BENITO then called [facility] at [phone] and spoke to Shady who reported that they only accept [facility] patients. BENITO then spoke to Paige, the individual who services the [facility]. Paige reported that they are located in [place], but their office is in [place], so they cannot deliver any oxygen until early next week. Orders and chart notes can be faxed to [phone]. BENITO then called Nicholas from [facility]. Nicholas reported that he will look into this and call CM back. Once return call was received, Nicholas reported that they do accept patient's insurance, but they only cover 50% of the DME cost, so patient  will be responsible for the rest. CM informed CM Risa of the same via TT.

## 2024-01-12 NOTE — RESPIRATORY THERAPY NOTE
Home Oxygen Qualifying Test     Patient name: Loretta Partida        : 1965   Date of Test:  2024  Diagnosis: covid infection   Home Oxygen Test:    **Medicare Guidelines require item(s) 1-5 on all ambulatory patients or 1 and 2 on non-ambulatory patients.    1. Baseline SPO2 on Room Air at rest 90 %   If <= 88% on Room Air add O2 via NC to obtain SpO2 >=88%. If LPM needed, document LPM     SPO2 during exertion on Room Air 86  %  During exertion monitor SPO2. If SPO2 increases >=89%, do not add supplemental oxygen    SPO2 on Oxygen at Rest 94 % at 3 LPM    SPO2 during exertion on Oxygen 91 % at 3 LPM    Test performed during exertion activity.      [x]  Supplemental Home Oxygen is indicated.    []  Client does not qualify for home oxygen.    Respiratory Additional Notes- Patient spo2 drops to 86% on room air during exertion.  Placed on 2LNC then increased to 3LNC to maintain an spo2 of 91% for duration of walk.     Maninder Abrams, RT

## 2024-01-12 NOTE — CASE MANAGEMENT
Case Management Discharge Planning Note    Patient name Loretta Partida  Location /-01 MRN [id number]  : 1965 Date 2024       Current Admission Date: 2024  Current Admission Diagnosis:Acute respiratory failure with hypoxia (HCC)   Patient Active Problem List    Diagnosis Date Noted    Severe protein-calorie malnutrition (HCC) 2024    Positive blood culture 2024    Hypomagnesemia 2024    Acute respiratory failure with hypoxia (HCC) 2024    COVID-19 2024    COPD exacerbation (HCC) 2024    Multiple sclerosis (HCC) 2024    Thoracic outlet syndrome 2024    Hyponatremia 2024      LOS (days): 7  Geometric Mean LOS (GMLOS) (days):   Days to GMLOS:     OBJECTIVE:  Risk of Unplanned Readmission Score: 12.71         Current admission status: Inpatient   Preferred Pharmacy:   [facility]/pharmacy #[id number] [address] [address]  Phone: [phone] Fax: [phone]    Primary Care Provider: Jordi Morse MD    Primary Insurance: BLUE CROSS  Secondary Insurance:     DISCHARGE DETAILS:    DME Referral Provided  Referral made for DME?: Yes  DME referral completed for the following items:: Home Oxygen concentrator  DME Supplier Name:: [email or web address]    Other Referral/Resources/Interventions Provided:  Interventions: DME  Referral Comments:  sent a referral to [facility] requesting home O2 set up. Per respiratory note, patient qualifies for 3L of O2. Signature request sent to [employer]. CM to deliver an Eclipse from the consignment once approved by [facility].

## 2024-01-13 VITALS
DIASTOLIC BLOOD PRESSURE: 69 MMHG | BODY MASS INDEX: 13.46 KG/M2 | SYSTOLIC BLOOD PRESSURE: 125 MMHG | HEART RATE: 85 BPM | RESPIRATION RATE: 18 BRPM | HEIGHT: 63 IN | TEMPERATURE: 97.7 F | OXYGEN SATURATION: 93 % | WEIGHT: 76 LBS

## 2024-01-13 LAB
ANION GAP SERPL CALCULATED.3IONS-SCNC: 4 MMOL/L
BACTERIA BLD CULT: NORMAL
BACTERIA BLD CULT: NORMAL
BUN SERPL-MCNC: 12 MG/DL (ref 5–25)
CALCIUM SERPL-MCNC: 8.4 MG/DL (ref 8.4–10.2)
CHLORIDE SERPL-SCNC: 88 MMOL/L (ref 96–108)
CO2 SERPL-SCNC: 41 MMOL/L (ref 21–32)
CREAT SERPL-MCNC: 0.28 MG/DL (ref 0.6–1.3)
DME PARACHUTE DELIVERY DATE REQUESTED: NORMAL
DME PARACHUTE ITEM DESCRIPTION: NORMAL
DME PARACHUTE ORDER STATUS: NORMAL
DME PARACHUTE SUPPLIER NAME: NORMAL
DME PARACHUTE SUPPLIER PHONE: NORMAL
ERYTHROCYTE [DISTWIDTH] IN BLOOD BY AUTOMATED COUNT: 12.4 % (ref 11.6–15.1)
GFR SERPL CREATININE-BSD FRML MDRD: 128 ML/MIN/1.73SQ M
GLUCOSE SERPL-MCNC: 89 MG/DL (ref 65–140)
HCT VFR BLD AUTO: 34.4 % (ref 34.8–46.1)
HGB BLD-MCNC: 11.6 G/DL (ref 11.5–15.4)
INR PPP: 2.65 (ref 0.84–1.19)
MAGNESIUM SERPL-MCNC: 1.6 MG/DL (ref 1.9–2.7)
MCH RBC QN AUTO: 33.1 PG (ref 26.8–34.3)
MCHC RBC AUTO-ENTMCNC: 33.7 G/DL (ref 31.4–37.4)
MCV RBC AUTO: 98 FL (ref 82–98)
PLATELET # BLD AUTO: 177 THOUSANDS/UL (ref 149–390)
PMV BLD AUTO: 9.4 FL (ref 8.9–12.7)
POTASSIUM SERPL-SCNC: 3.1 MMOL/L (ref 3.5–5.3)
PROTHROMBIN TIME: 29.2 SECONDS (ref 11.6–14.5)
RBC # BLD AUTO: 3.5 MILLION/UL (ref 3.81–5.12)
SODIUM SERPL-SCNC: 133 MMOL/L (ref 135–147)
WBC # BLD AUTO: 4.89 THOUSAND/UL (ref 4.31–10.16)

## 2024-01-13 PROCEDURE — 80048 BASIC METABOLIC PNL TOTAL CA: CPT | Performed by: STUDENT IN AN ORGANIZED HEALTH CARE EDUCATION/TRAINING PROGRAM

## 2024-01-13 PROCEDURE — 94640 AIRWAY INHALATION TREATMENT: CPT

## 2024-01-13 PROCEDURE — 85027 COMPLETE CBC AUTOMATED: CPT | Performed by: STUDENT IN AN ORGANIZED HEALTH CARE EDUCATION/TRAINING PROGRAM

## 2024-01-13 PROCEDURE — 85610 PROTHROMBIN TIME: CPT | Performed by: STUDENT IN AN ORGANIZED HEALTH CARE EDUCATION/TRAINING PROGRAM

## 2024-01-13 PROCEDURE — 99239 HOSP IP/OBS DSCHRG MGMT >30: CPT | Performed by: STUDENT IN AN ORGANIZED HEALTH CARE EDUCATION/TRAINING PROGRAM

## 2024-01-13 PROCEDURE — 94760 N-INVAS EAR/PLS OXIMETRY 1: CPT

## 2024-01-13 PROCEDURE — 94664 DEMO&/EVAL PT USE INHALER: CPT

## 2024-01-13 PROCEDURE — 83735 ASSAY OF MAGNESIUM: CPT | Performed by: STUDENT IN AN ORGANIZED HEALTH CARE EDUCATION/TRAINING PROGRAM

## 2024-01-13 RX ORDER — FLUTICASONE PROPIONATE AND SALMETEROL 250; 50 UG/1; UG/1
1 POWDER RESPIRATORY (INHALATION) 2 TIMES DAILY
Qty: 60 BLISTER | Refills: 0 | Status: SHIPPED | OUTPATIENT
Start: 2024-01-13

## 2024-01-13 RX ORDER — MAGNESIUM SULFATE HEPTAHYDRATE 40 MG/ML
4 INJECTION, SOLUTION INTRAVENOUS ONCE
Status: DISCONTINUED | OUTPATIENT
Start: 2024-01-13 | End: 2024-01-13 | Stop reason: HOSPADM

## 2024-01-13 RX ORDER — TIOTROPIUM BROMIDE 18 UG/1
18 CAPSULE ORAL; RESPIRATORY (INHALATION) DAILY
Qty: 30 CAPSULE | Refills: 0 | Status: SHIPPED | OUTPATIENT
Start: 2024-01-13

## 2024-01-13 RX ORDER — ALBUTEROL SULFATE 90 UG/1
2 AEROSOL, METERED RESPIRATORY (INHALATION) EVERY 6 HOURS PRN
Qty: 18 G | Refills: 0 | Status: SHIPPED | OUTPATIENT
Start: 2024-01-13

## 2024-01-13 RX ORDER — POTASSIUM CHLORIDE 20 MEQ/1
40 TABLET, EXTENDED RELEASE ORAL ONCE
Status: COMPLETED | OUTPATIENT
Start: 2024-01-13 | End: 2024-01-13

## 2024-01-13 RX ORDER — PREDNISONE 10 MG/1
TABLET ORAL DAILY
Qty: 30 TABLET | Refills: 0 | Status: SHIPPED | OUTPATIENT
Start: 2024-01-13 | End: 2024-01-25

## 2024-01-13 RX ORDER — CALCIUM CARBONATE/VITAMIN D3 500-10/5ML
400 LIQUID (ML) ORAL 2 TIMES DAILY
Qty: 30 TABLET | Refills: 0 | Status: SHIPPED | OUTPATIENT
Start: 2024-01-13

## 2024-01-13 RX ORDER — ALBUTEROL SULFATE 2.5 MG/3ML
2.5 SOLUTION RESPIRATORY (INHALATION) EVERY 6 HOURS PRN
Qty: 125 ML | Refills: 0 | Status: SHIPPED | OUTPATIENT
Start: 2024-01-13

## 2024-01-13 RX ORDER — BUDESONIDE 0.5 MG/2ML
0.5 INHALANT ORAL
Qty: 30 ML | Refills: 0 | Status: SHIPPED | OUTPATIENT
Start: 2024-01-13

## 2024-01-13 RX ORDER — POTASSIUM CHLORIDE 20 MEQ/1
20 TABLET, EXTENDED RELEASE ORAL DAILY
Qty: 15 TABLET | Refills: 0 | Status: SHIPPED | OUTPATIENT
Start: 2024-01-13

## 2024-01-13 RX ADMIN — IPRATROPIUM BROMIDE 0.5 MG: 0.5 SOLUTION RESPIRATORY (INHALATION) at 14:57

## 2024-01-13 RX ADMIN — POTASSIUM CHLORIDE 40 MEQ: 1500 TABLET, EXTENDED RELEASE ORAL at 10:42

## 2024-01-13 RX ADMIN — OXYCODONE HYDROCHLORIDE AND ACETAMINOPHEN 1 TABLET: 5; 325 TABLET ORAL at 10:41

## 2024-01-13 RX ADMIN — IPRATROPIUM BROMIDE 0.5 MG: 0.5 SOLUTION RESPIRATORY (INHALATION) at 07:48

## 2024-01-13 RX ADMIN — LEVALBUTEROL HYDROCHLORIDE 1.25 MG: 1.25 SOLUTION RESPIRATORY (INHALATION) at 14:57

## 2024-01-13 RX ADMIN — LEVALBUTEROL HYDROCHLORIDE 1.25 MG: 1.25 SOLUTION RESPIRATORY (INHALATION) at 07:48

## 2024-01-13 RX ADMIN — NICOTINE 1 PATCH: 21 PATCH, EXTENDED RELEASE TRANSDERMAL at 10:43

## 2024-01-13 RX ADMIN — DEXAMETHASONE SODIUM PHOSPHATE 6 MG: 10 INJECTION, SOLUTION INTRAMUSCULAR; INTRAVENOUS at 06:30

## 2024-01-13 RX ADMIN — BUDESONIDE INHALATION 0.5 MG: 0.5 SUSPENSION RESPIRATORY (INHALATION) at 07:48

## 2024-01-13 RX ADMIN — FORMOTEROL FUMARATE DIHYDRATE 20 MCG: 20 SOLUTION RESPIRATORY (INHALATION) at 07:48

## 2024-01-13 NOTE — RESPIRATORY THERAPY NOTE
RT Protocol Note  Loretta Partida 58 y.o. female MRN: [id number]  Unit/Bed#: -01 Encounter: [id number]    Assessment    Principal Problem:    Acute respiratory failure with hypoxia (HCC)  Active Problems:    COVID-19    COPD exacerbation (HCC)    Multiple sclerosis (HCC)    Thoracic outlet syndrome    Hyponatremia    Severe protein-calorie malnutrition (HCC)    Positive blood culture    Hypomagnesemia      Home Pulmonary Medications:  inhalers       Past Medical History:   Diagnosis Date    COPD (chronic obstructive pulmonary disease) (HCC) 1/5/2024    Multiple sclerosis (HCC) 1/5/2024    Osteoporosis     Thoracic outlet syndrome 1/5/2024     Social History     Socioeconomic History    Marital status: /Civil Union     Spouse name: None    Number of children: None    Years of education: None    Highest education level: None   Occupational History    None   Tobacco Use    Smoking status: Every Day     Current packs/day: 0.50     Average packs/day: 0.5 packs/day for 30.0 years (15.0 ttl pk-yrs)     Types: Cigarettes     Start date: 1/5/1994    Smokeless tobacco: Never   Substance and Sexual Activity    Alcohol use: Not Currently    Drug use: Never    Sexual activity: None   Other Topics Concern    None   Social History Narrative    None     Social Determinants of Health     Financial Resource Strain: Not on file   Food Insecurity: No Food Insecurity (1/9/2024)    Hunger Vital Sign     Worried About Running Out of Food in the Last Year: Never true     Ran Out of Food in the Last Year: Never true   Transportation Needs: No Transportation Needs (1/9/2024)    PRAPARE - Transportation     Lack of Transportation (Medical): No     Lack of Transportation (Non-Medical): No   Physical Activity: Not on file   Stress: Not on file   Social Connections: Not on file   Intimate Partner Violence: Not on file   Housing Stability: Low Risk  (1/9/2024)    Housing Stability Vital Sign     Unable to Pay for Housing in the Last Year:  "No     Number of Places Lived in the Last Year: 1     Unstable Housing in the Last Year: No       Subjective         Objective    Physical Exam:   Assessment Type: During-treatment  General Appearance: Awake, Alert  Respiratory Pattern: Normal  Chest Assessment: Chest expansion symmetrical  Bilateral Breath Sounds: Diminished  Cough: Congested, Non-productive  O2 Device: NC    Vitals:  Blood pressure 130/71, pulse 88, temperature 98.5 °F (36.9 °C), resp. rate 18, height 5' 3\" (1.6 m), weight 34.5 kg (76 lb), SpO2 96%.    Results from last 7 days   Lab Units 01/11/24  0757   PH ART  7.422   PCO2 ART mm Hg 66.3*   PO2 ART mm Hg 55.7*   HCO3 ART mmol/L 42.2*   BASE EXC ART mmol/L 14.6   O2 CONTENT ART mL/dL 17.2   O2 HGB, ARTERIAL % 89.0*   SERENE TEST  Yes       Imaging and other studies: I have personally reviewed pertinent reports.      O2 Device: NC     Plan    Respiratory Plan: Mild Distress pathway        Resp Comments: Pt sitting comfortably and in no apparent distress.  Offers no complaints.  Will cont w. baldomero tx.   "

## 2024-01-13 NOTE — ASSESSMENT & PLAN NOTE
Malnutrition Findings:   Adult Malnutrition type: Chronic illness  Adult Degree of Malnutrition: Other severe protein calorie malnutrition  Malnutrition Characteristics: Weight loss, Inadequate energy                  360 Statement: Related to chronic illness (COPD, MS) as evidenced by consuming < 75% of energy energy requirement for > 1 month and reported 24% weight loss over the past year. Treated with diet and supplements.    BMI Findings:  Adult BMI Classifications: Underweight < 18.5        Body mass index is 13.46 kg/m².      Statement Selected

## 2024-01-13 NOTE — CASE MANAGEMENT
Case Management Progress Note    Patient name Loretta Partida  Location /-01 MRN [id number]  : 1965 Date 2024       LOS (days): 8  Geometric Mean LOS (GMLOS) (days):   Days to GMLOS:        OBJECTIVE:        Current admission status: Inpatient  Preferred Pharmacy:   [facility]/pharmacy #[id number] - [address] [address]  Phone: [phone] Fax: [phone]    Primary Care Provider: Jordi Morse MD    Primary Insurance: BLUE CROSS  Secondary Insurance:     PROGRESS NOTE:  As per Martha Lund from [facility] DME via [place], I have verified and spoken to Artemio-coverage is 100% no coinsurance. said  is also ordering a nebulizer if you could send that rx please. BENITO submitted nebulizer order as well and requested an ETA of when DME will be delivered to pt for a safe dc. No updated on ETA. BENITO continues to follow.

## 2024-01-13 NOTE — PLAN OF CARE
Problem: PAIN - ADULT  Goal: Verbalizes/displays adequate comfort level or baseline comfort level  Description: Interventions:  - Encourage patient to monitor pain and request assistance  - Assess pain using appropriate pain scale  - Administer analgesics based on type and severity of pain and evaluate response  - Implement non-pharmacological measures as appropriate and evaluate response  - Consider cultural and social influences on pain and pain management  - Notify physician/advanced practitioner if interventions unsuccessful or patient reports new pain  Outcome: Progressing     Problem: INFECTION - ADULT  Goal: Absence or prevention of progression during hospitalization  Description: INTERVENTIONS:  - Assess and monitor for signs and symptoms of infection  - Monitor lab/diagnostic results  - Monitor all insertion sites, i.e. indwelling lines, tubes, and drains  - Monitor endotracheal if appropriate and nasal secretions for changes in amount and color  - [place] appropriate cooling/warming therapies per order  - Administer medications as ordered  - Instruct and encourage patient and family to use good hand hygiene technique  - Identify and instruct in appropriate isolation precautions for identified infection/condition  Outcome: Progressing     Problem: SAFETY ADULT  Goal: Patient will remain free of falls  Description: INTERVENTIONS:  - Educate patient/family on patient safety including physical limitations  - Instruct patient to call for assistance with activity   - Consult OT/PT to assist with strengthening/mobility   - Keep Call bell within reach  - Keep bed low and locked with side rails adjusted as appropriate  - Keep care items and personal belongings within reach  - Initiate and maintain comfort rounds  - Make Fall Risk Sign visible to staff  - Offer Toileting every 2 Hours, in advance of need  - Initiate/Maintain bed/chair alarm  - Obtain necessary fall risk management equipment:   - Apply yellow  socks and bracelet for high fall risk patients  - Consider moving patient to room near nurses station  Outcome: Progressing     Problem: RESPIRATORY - ADULT  Goal: Achieves optimal ventilation and oxygenation  Description: INTERVENTIONS:  - Assess for changes in respiratory status  - Assess for changes in mentation and behavior  - Position to facilitate oxygenation and minimize respiratory effort  - Oxygen administered by appropriate delivery if ordered  - Initiate smoking cessation education as indicated  - Encourage broncho-pulmonary hygiene including cough, deep breathe, Incentive Spirometry  - Assess the need for suctioning and aspirate as needed  - Assess and instruct to report SOB or any respiratory difficulty  - Respiratory Therapy support as indicated  Outcome: Progressing     Problem: Prexisting or High Potential for Compromised Skin Integrity  Goal: Skin integrity is maintained or improved  Description: INTERVENTIONS:  - Identify patients at risk for skin breakdown  - Assess and monitor skin integrity  - Assess and monitor nutrition and hydration status  - Monitor labs   - Assess for incontinence   - Turn and reposition patient  - Assist with mobility/ambulation  - Relieve pressure over bony prominences  - Avoid friction and shearing  - Provide appropriate hygiene as needed including keeping skin clean and dry  - Evaluate need for skin moisturizer/barrier cream  - Collaborate with interdisciplinary team   - Patient/family teaching  - Consider wound care consult   Outcome: Progressing

## 2024-01-13 NOTE — DISCHARGE SUMMARY
[facility]  Discharge- Loretta Partida 1965, 58 y.o. female MRN: [id number]  Unit/Bed#: -01 Encounter: [id number]  Primary Care Provider: Jordi Morse MD   Date and time admitted to hospital: 1/5/2024  6:08 PM    * Acute respiratory failure with hypoxia (HCC)  Assessment & Plan  Acute respiratory failure with hypoxia secondary to COVID-19 infection superimposed on baseline COPD  Currently over saturation 93-95 % on 1-2L nasal cannula.  Plan was to discharge her yesterday however there was some issue with getting home oxygen approved as per case management.    Currently patient is hemodynamically before discharge.  Today I was informed by case management that home oxygen is 100% covered and has been arranged.  Discharging home on tapering dose of prednisone, continue home dose of Advair, Spiriva, albuterol pump as well as will discharge home on albuterol nebulizer as needed.  Recommend close outpatient follow-up with PCP, pulmonology.  Patient is in agreement with above plan.      Hypomagnesemia  Assessment & Plan  Morning labs noted with mild hypomagnesemia and 2 g IV magnesium had ordered however seems like patient had refused as per nursing report.  Discharging home on p.o. potassium and magnesium supplement and recommended to follow-up with primary care provider in 1 to 2 weeks to monitor levels.      Positive blood culture  Assessment & Plan  1 out of 2 blood cultures on admission growing Micrococcus luteus  This appears consistent with contamination  Patient remained stable off antibiotics.  Repeat cultures have been without growth in 5 days.    Severe protein-calorie malnutrition (HCC)  Assessment & Plan  Malnutrition Findings:   Adult Malnutrition type: Chronic illness  Adult Degree of Malnutrition: Other severe protein calorie malnutrition  Malnutrition Characteristics: Weight loss, Inadequate energy                  360 Statement: Related to chronic illness  (COPD, MS) as evidenced by consuming < 75% of energy energy requirement for > 1 month and reported 24% weight loss over the past year. Treated with diet and supplements.    BMI Findings:  Adult BMI Classifications: Underweight < 18.5        Body mass index is 13.46 kg/m².       Hyponatremia  Assessment & Plan  Severe hyponatremia on admission at 117; probably secondary to SIADH  Now improving with fluid restriction  Currently sodium 133  Recommend close outpatient follow-up with primary care provider to have repeat blood work in 1 to 2 weeks.  Patient is in agreement with our plan.    Thoracic outlet syndrome  Assessment & Plan  INR now therapeutic.  Continue Coumadin 5 mg daily  Recommend outpatient follow-up with PCP for INR monitoring and adjusting Coumadin as needed.      Multiple sclerosis (HCC)  Assessment & Plan   patient is on some pain medications and Valium as needed   reviewed and patient is taking Tylenol 3 and Valium at home   was upset about patient being in pain for her chronic lower vertebral issues however patient had not complained to me during my encounters of her pain being uncontrolled and seems like patient had oxycodone ordered every 6 hours as needed which she only used twice in 24 hours.  I have recommended outpatient follow-up with PCP.  Patient is in agreement with above plan.    COPD exacerbation (HCC)  Assessment & Plan  Continue home inhaler regimen  O2 demand improving slowly day by day.  Currently O2 saturation 93 to 95% on 1-2 L nasal cannula.    Current hemodynamically stable for discharge.  Home oxygen has been arranged as per case management.  Continue tapering dose of prednisone along with Advair, Spiriva, albuterol pump as well as albuterol nebulizer.  Recommend outpatient follow-up with pulmonology as well as pulmonary rehab however patient reports that she does not want to go to rehab.     COVID-19  Assessment & Plan  Patient presented with progressive shortness of  breath and dyspnea and found to be positive for COVID in the emergency room  This is superimposed on baseline COPD, active smoking    Completed 5 days of IV remdesivir  Completed 8 days of IV Decadron  Infection markers are within normal limits, D-dimer are normal limits.  Patient had reportedly refused nicotine patch however agreeable this morning.  INR currently therapeutic   Procalcitonin negative x 2;   Remain stable of antibiotics.  Current hemodynamically stable for discharge and home oxygen has been arranged as per case management.        Discharging Physician / Practitioner: Xander Palafox MD  PCP: Jordi Morse MD  Admission Date:   Admission Orders (From admission, onward)       Ordered        01/05/24 2107  Inpatient Admission  Once                          Discharge Date: 01/13/24    Medical Problems       Resolved Problems  Date Reviewed: 1/13/2024   None         Consultations During Hospital Stay:  Nephrology, pulmonology.    Reason for Admission: Acute respiratory failure with hypoxia secondary to COPD exacerbation, COVID-19 infection.    Hospital Course:     Loretta Partida is a 58 y.o. female patient with past medical history of thoracic outlet syndrome, COPD, multiple sclerosis, back pain due to reported vertebral fracture, significant smoking history, current active smoker pack a day who originally presented to the hospital on 1/5/2024 due to shortness of breath.  Patient reportedly having shortness of breath for years and used to follow-up in the pulmonology however has not followed up with pulmonology office however continue taking her Advair, Spiriva and albuterol pump needed.  Presented to emergency room since she is unable to walk few steps without becoming short of breath.  Patient denied any other associate complaint at the time however noted positive for COVID-19 and subsequently patient was hospitalized due to COPD exacerbation.  Completed 5 days of IV remdesivir, 8 days of  IV Decadron and patient was started on baricitinib which was discontinued since inflammatory markers are within normal limits.  D-dimer within normal limits.  Chest x-ray reported negative for acute finding.  Initially patient required high flow nasal cannula around 10 to 12 L at the time of the admission and slowly improved currently saturating 94 to 96% on 1 to 2 L nasal cannula at rest and requires 3 L upon ambulation.  Patient discarded for home oxygen as per home O2 evaluation.  Patient was seen by pulmonology on this admission and treated as per the recommendation.  Currently patient has been cleared for discharge however awaiting home O2 arrangement which had been difficult as per case management report however today I was informed that home oxygen is 100% covered and has been arranged as per case management.  Currently patient is hemodynamically stable for discharge with close outpatient follow-up.  Will discharge on tapering dose of prednisone as well as above stated inhaler and nebulizer.  Recommend close outpatient follow-up with primary care provider, pulmonology.  She was also noted with hyponatremia sodium 117 on presentation and was seen by nephrology which was thought to be due to SIADH and currently much better 133.  Recommended continue fluid restriction at home and outpatient follow-up with PCP to have repeat CBC, CMP within 1 week.  Currently patient is hemodynamically stable for discharge.  No other events reported.  Refer to earlier notes for further clarification.      Please see above list of diagnoses and related plan for additional information.     Condition at Discharge: good     Discharge Day Visit / Exam:     Subjective: Seen during a.m. rounds.  Appears comfortable nondistressed.  He is upset about not being able to go home yesterday.  And was discharged yesterday however home oxygen was not arranged as per case management report therefore she was not discharged.  I was told by case  "management team that home oxygen is 100% covered by insurance company and it has been arranged.  Patient currently denies chest pain, dyspnea, fever, chills, nausea, vomiting, any other new complaints and eager to go home.      Of note:  came with portable O2 to  pt and he  is extremely angry and being unreasonable and making accusation stating \" he was being lied  to about the insurance not approving home oxygen when he had called his insurance company confirming it was 100% covered and how he had to do our work for his wife to get home oxygen ...etc\". I had attempted to explain the situation of home oxygen being arrange by our case management and there were getting that information from the [employer] company he had requested as per CM and I had no direct role in discussion with DME company or insurance company but he was not ready to listen and situation was kept escalating therefore I had to divert my attention to the patient Loretta. At the time of discharge pt is happy that she was able to eat 3 meals which she was not able to do before and in pleasant mood since she is going home today and she is more reasonable with my discussion and patient is agreeable with outpatient follow-up plan as recommended above.      Vitals: Blood Pressure: 125/69 (01/13/24 0806)  Pulse: 85 (01/13/24 0806)  Temperature: 97.7 °F (36.5 °C) (01/13/24 0806)  Temp Source: Oral (01/05/24 2257)  Respirations: 18 (01/13/24 0752)  Height: 5' 3\" (160 cm) (01/11/24 1002)  Weight - Scale: 34.5 kg (76 lb) (01/11/24 1002)  SpO2: 93 % (01/13/24 1457)    Exam:   Physical Exam  Constitutional:       Comments: Elderly female patient, appears older than stated age, cachectic, chronically ill and deconditioned, acutely nontoxic appearing.   HENT:      Head: Normocephalic and atraumatic.   Eyes:      Pupils: Pupils are equal, round, and reactive to light.   Cardiovascular:      Rate and Rhythm: Normal rate.      Pulses: Normal pulses.   Pulmonary: "      Effort: Pulmonary effort is normal. No respiratory distress.      Comments: Decreased lung sounds however patient is not in acute respiratory distress.  Able to speak in full sentences.  O2 saturation 93 to 95% on 1-2 L nasal cannula..  Abdominal:      General: Bowel sounds are normal. There is no distension.      Palpations: Abdomen is soft.      Tenderness: There is no abdominal tenderness.   Musculoskeletal:      Cervical back: No rigidity.      Right lower leg: No edema.      Left lower leg: No edema.   Neurological:      Mental Status: She is alert and oriented to person, place, and time. Mental status is at baseline.   Psychiatric:         Mood and Affect: Mood normal.         Behavior: Behavior normal.         Discussion with Family:  at bedside    Discharge instructions/Information to patient and family:   See after visit summary for information provided to patient and family.      Provisions for Follow-Up Care:  See after visit summary for information related to follow-up care and any pertinent home health orders.      Disposition:     Home with VNA Services (Reminder: Complete face to face encounter)      Planned Readmission:      Discharge Statement:  I spent 40 minutes discharging the patient. This time was spent on the day of discharge. I had direct contact with the patient on the day of discharge. Greater than 50% of the total time was spent examining patient, answering all patient questions, arranging and discussing plan of care with patient as well as directly providing post-discharge instructions.  Additional time then spent on discharge activities.    Discharge Medications:  See after visit summary for reconciled discharge medications provided to patient and family.      ** Please Note: This note has been constructed using a voice recognition system **

## 2024-01-13 NOTE — CASE MANAGEMENT
Case Management Discharge Planning Note    Patient name Loretta Partida  Location /-01 MRN [id number]  : 1965 Date 2024       Current Admission Date: 2024  Current Admission Diagnosis:Acute respiratory failure with hypoxia (HCC)   Patient Active Problem List    Diagnosis Date Noted    Severe protein-calorie malnutrition (HCC) 2024    Positive blood culture 2024    Hypomagnesemia 2024    Acute respiratory failure with hypoxia (HCC) 2024    COVID-19 2024    COPD exacerbation (HCC) 2024    Multiple sclerosis (HCC) 2024    Thoracic outlet syndrome 2024    Hyponatremia 2024      LOS (days): 8  Geometric Mean LOS (GMLOS) (days):   Days to GMLOS:     OBJECTIVE:  Risk of Unplanned Readmission Score: 13.33         Current admission status: Inpatient   Preferred Pharmacy:   [facility]/pharmacy #[id number] [address] [address]  Phone: [phone] Fax: [phone]    Primary Care Provider: Jordi Morse MD    Primary Insurance: BLUE CROSS  Secondary Insurance:     DISCHARGE DETAILS:  CM followed up on pt home o2. As per paracte, it appears that pt has a [facility] Care. CM called  pt insurance at [phone] and requested to speak with the DME department. Call unsuccessful as no representative is available over the weekend. As per [place], the require a [facility] ID# which was given via [employer]. At this time, home o2 continues to be pending due to requiring insurance auth. CM continues to follow and assist with pt dc plans.

## 2024-01-13 NOTE — DISCHARGE INSTR - AVS FIRST PAGE
Recommended close follow-up with primary care provider within 1 week of discharge.  Recommend to repeat blood work for CBC, BMP, magnesium level within 1 week with primary care provider.  Continue outpatient follow-up with primary care provider for INR monitoring and adjusting Coumadin as needed.  Recommended outpatient follow-up with pulmonology.

## 2024-01-13 NOTE — CASE MANAGEMENT
Case Management Discharge Planning Note    Patient name Loretta Partida  Location /-01 MRN [id number]  : 1965 Date 2024       Current Admission Date: 2024  Current Admission Diagnosis:Acute respiratory failure with hypoxia (HCC)   Patient Active Problem List    Diagnosis Date Noted    Severe protein-calorie malnutrition (HCC) 2024    Positive blood culture 2024    Hypomagnesemia 2024    Acute respiratory failure with hypoxia (HCC) 2024    COVID-19 2024    COPD exacerbation (HCC) 2024    Multiple sclerosis (HCC) 2024    Thoracic outlet syndrome 2024    Hyponatremia 2024      LOS (days): 8  Geometric Mean LOS (GMLOS) (days):   Days to GMLOS:     OBJECTIVE:  Risk of Unplanned Readmission Score: 13.66         Current admission status: Inpatient   Preferred Pharmacy:   [facility]/pharmacy #[id number] [address] [address]  Phone: [phone] Fax: [phone]    Primary Care Provider: Jordi Morse MD    Primary Insurance: BLUE CROSS  Secondary Insurance:     DISCHARGE DETAILS:    Discharge planning discussed with:: pt   Freedom of Choice: Yes  Comments - Freedom of Choice: As per SLIM, pt is medically cleared for discharge today. BENITO called [phone] and spoke with Tiera at [facility] who confirmed the pt home o2 and nebulizer were approved and out for delivery today. Pt will be recieving a portable o2 to  be transported home today. BENITO called and spoke with pt  Juancarlos who was made aware of the above. As per pt , DME is already being set up in pt home today. Pt has a home concentrator , portable o2 and a nebulizer delivered at home. SLIM made aware. Pt  to transport pt home later on today. BENITO encourages pt  to contact the CM dept with any questions or concerns. CM continues to be available.  CM contacted  family/caregiver?: Yes  Were Treatment Team discharge recommendations reviewed with patient/caregiver?: Yes  Did patient/caregiver verbalize understanding of patient care needs?: Yes  Were patient/caregiver advised of the risks associated with not following Treatment Team discharge recommendations?: Yes    Contacts  Patient Contacts: Artemio  Relationship to Patient:: Family  Contact Method: Phone  Phone Number: [phone]  Reason/Outcome: Continuity of Care, Discharge Planning, Emergency Contact    Requested Home Health Care         Is the patient interested in HHC at discharge?: No    DME Referral Provided  DME referral completed for the following items:: Nebulizer, Home Oxygen concentrator  DME Supplier Name:: [employer]    Other Referral/Resources/Interventions Provided:  Interventions: DME  Referral Comments: DME delivered to pt home    Would you like to participate in our [facility] service program?  : No - Declined    Treatment Team Recommendation: Home  Discharge Destination Plan:: Home  Transport at Discharge : Family

## 2024-01-15 NOTE — UTILIZATION REVIEW
NOTIFICATION OF ADMISSION DISCHARGE   This is a Notification of Discharge from [facility]. Please be advised that this patient has been discharge from our facility. Below you will find the admission and discharge date and time including the patient’s disposition.   UTILIZATION REVIEW CONTACT:  Vanda Foreman  Utilization   Network Utilization Review Department  Phone: [phone] x carefully listen to the prompts. All voicemails are confidential.  Email: [email or web address]     ADMISSION INFORMATION  PRESENTATION DATE: 1/5/2024  6:08 PM  OBERVATION ADMISSION DATE:   INPATIENT ADMISSION DATE: 1/5/24  9:07 PM   DISCHARGE DATE: 1/13/2024  3:58 PM   DISPOSITION:Home with Home Health Care    Network Utilization Review Department  ATTENTION: Please call with any questions or concerns to [phone] and carefully listen to the prompts so that you are directed to the right person. All voicemails are confidential.   For Discharge needs, contact Care Management DC Support Team at [phone] opt. 2  Send all requests for admission clinical reviews, approved or denied determinations and any other requests to dedicated fax number below belonging to the campus where the patient is receiving treatment. List of dedicated fax numbers for the Facilities:  FACILITY NAME UR FAX NUMBER   ADMISSION DENIALS (Administrative/Medical Necessity) [phone]   DISCHARGE SUPPORT TEAM ([facility]) [phone]   PARENT CHILD HEALTH (Maternity/NICU/Pediatrics) [phone]   [facility] [phone]   [facility] [phone]   [facility] [phone]   [facility] [phone]   [facility] [phone]   [facility] [phone]   [facility] [phone]   [facility]  [phone]   [facility] [phone]   [facility] [phone]   [facility] [phone]

## 2024-01-23 LAB
BASE EXCESS BLDA CALC-SCNC: 7 MMOL/L (ref -2–3)
CA-I BLD-SCNC: 1 MMOL/L (ref 1.12–1.32)
GLUCOSE SERPL-MCNC: 116 MG/DL (ref 65–140)
HCO3 BLDA-SCNC: 34.8 MMOL/L (ref 24–30)
HCT VFR BLD CALC: 44 % (ref 34.8–46.1)
HGB BLDA-MCNC: 15 G/DL (ref 11.5–15.4)
PCO2 BLD: 37 MMOL/L (ref 21–32)
PCO2 BLD: 60.6 MM HG (ref 42–50)
PH BLD: 7.37 [PH] (ref 7.3–7.4)
PO2 BLD: 35 MM HG (ref 35–45)
POTASSIUM BLD-SCNC: 3.6 MMOL/L (ref 3.5–5.3)
SAO2 % BLD FROM PO2: 63 % (ref 60–85)
SODIUM BLD-SCNC: 111 MMOL/L (ref 136–145)
SPECIMEN SOURCE: ABNORMAL

## 2024-02-20 ENCOUNTER — APPOINTMENT (OUTPATIENT)
Dept: LAB | Facility: HOSPITAL | Age: 59
End: 2024-02-20
Payer: COMMERCIAL

## 2024-02-20 DIAGNOSIS — Z79.01 LONG TERM (CURRENT) USE OF ANTICOAGULANTS: ICD-10-CM

## 2024-02-20 DIAGNOSIS — R53.83 OTHER FATIGUE: ICD-10-CM

## 2024-02-20 LAB
ALBUMIN SERPL BCP-MCNC: 4.2 G/DL (ref 3.5–5)
ALP SERPL-CCNC: 65 U/L (ref 34–104)
ALT SERPL W P-5'-P-CCNC: 19 U/L (ref 7–52)
ANION GAP SERPL CALCULATED.3IONS-SCNC: 3 MMOL/L
AST SERPL W P-5'-P-CCNC: 22 U/L (ref 13–39)
BILIRUB SERPL-MCNC: 0.41 MG/DL (ref 0.2–1)
BUN SERPL-MCNC: 6 MG/DL (ref 5–25)
CALCIUM SERPL-MCNC: 9.9 MG/DL (ref 8.4–10.2)
CHLORIDE SERPL-SCNC: 96 MMOL/L (ref 96–108)
CO2 SERPL-SCNC: 39 MMOL/L (ref 21–32)
CREAT SERPL-MCNC: 0.45 MG/DL (ref 0.6–1.3)
ERYTHROCYTE [DISTWIDTH] IN BLOOD BY AUTOMATED COUNT: 12.8 % (ref 11.6–15.1)
GFR SERPL CREATININE-BSD FRML MDRD: 109 ML/MIN/1.73SQ M
GLUCOSE SERPL-MCNC: 81 MG/DL (ref 65–140)
HCT VFR BLD AUTO: 36.3 % (ref 34.8–46.1)
HGB BLD-MCNC: 11.6 G/DL (ref 11.5–15.4)
INR PPP: 1.04 (ref 0.84–1.19)
MAGNESIUM SERPL-MCNC: 1.7 MG/DL (ref 1.9–2.7)
MCH RBC QN AUTO: 32.5 PG (ref 26.8–34.3)
MCHC RBC AUTO-ENTMCNC: 32 G/DL (ref 31.4–37.4)
MCV RBC AUTO: 102 FL (ref 82–98)
PLATELET # BLD AUTO: 345 THOUSANDS/UL (ref 149–390)
PMV BLD AUTO: 8 FL (ref 8.9–12.7)
POTASSIUM SERPL-SCNC: 5 MMOL/L (ref 3.5–5.3)
PROT SERPL-MCNC: 6.9 G/DL (ref 6.4–8.4)
PROTHROMBIN TIME: 14.2 SECONDS (ref 11.6–14.5)
RBC # BLD AUTO: 3.57 MILLION/UL (ref 3.81–5.12)
SODIUM SERPL-SCNC: 138 MMOL/L (ref 135–147)
WBC # BLD AUTO: 6.21 THOUSAND/UL (ref 4.31–10.16)

## 2024-02-20 PROCEDURE — 83735 ASSAY OF MAGNESIUM: CPT

## 2024-02-20 PROCEDURE — 85027 COMPLETE CBC AUTOMATED: CPT

## 2024-02-20 PROCEDURE — 36415 COLL VENOUS BLD VENIPUNCTURE: CPT

## 2024-02-20 PROCEDURE — 85610 PROTHROMBIN TIME: CPT

## 2024-02-20 PROCEDURE — 80053 COMPREHEN METABOLIC PANEL: CPT

## 2024-02-27 ENCOUNTER — TELEPHONE (OUTPATIENT)
Age: 59
End: 2024-02-27

## 2024-02-27 NOTE — TELEPHONE ENCOUNTER
Called and lvm for patient to see if she wanted to make a HFU from a referral we received. Patient saw Dr. Sanchez in the hospital on 1/5.